# Patient Record
Sex: FEMALE | Race: WHITE | NOT HISPANIC OR LATINO | Employment: UNEMPLOYED | URBAN - METROPOLITAN AREA
[De-identification: names, ages, dates, MRNs, and addresses within clinical notes are randomized per-mention and may not be internally consistent; named-entity substitution may affect disease eponyms.]

---

## 2017-02-20 ENCOUNTER — HISTORICAL (OUTPATIENT)
Dept: LAB | Facility: HOSPITAL | Age: 57
End: 2017-02-20

## 2017-07-27 ENCOUNTER — HISTORICAL (OUTPATIENT)
Dept: ADMINISTRATIVE | Facility: HOSPITAL | Age: 57
End: 2017-07-27

## 2017-07-27 LAB — TSH SERPL-ACNC: 0.53 MIU/ML (ref 0.36–3.74)

## 2017-08-10 ENCOUNTER — HISTORICAL (OUTPATIENT)
Dept: RADIOLOGY | Facility: HOSPITAL | Age: 57
End: 2017-08-10

## 2017-09-14 ENCOUNTER — HISTORICAL (OUTPATIENT)
Dept: RESPIRATORY THERAPY | Facility: HOSPITAL | Age: 57
End: 2017-09-14

## 2017-10-24 ENCOUNTER — HISTORICAL (OUTPATIENT)
Dept: RESPIRATORY THERAPY | Facility: HOSPITAL | Age: 57
End: 2017-10-24

## 2017-11-13 ENCOUNTER — HISTORICAL (OUTPATIENT)
Dept: RESPIRATORY THERAPY | Facility: HOSPITAL | Age: 57
End: 2017-11-13

## 2018-10-30 ENCOUNTER — HISTORICAL (OUTPATIENT)
Dept: ADMINISTRATIVE | Facility: HOSPITAL | Age: 58
End: 2018-10-30

## 2019-01-15 ENCOUNTER — HISTORICAL (OUTPATIENT)
Dept: ADMINISTRATIVE | Facility: HOSPITAL | Age: 59
End: 2019-01-15

## 2019-07-18 ENCOUNTER — HISTORICAL (OUTPATIENT)
Dept: CARDIOLOGY | Facility: HOSPITAL | Age: 59
End: 2019-07-18

## 2020-07-28 ENCOUNTER — HISTORICAL (OUTPATIENT)
Dept: ADMINISTRATIVE | Facility: HOSPITAL | Age: 60
End: 2020-07-28

## 2020-07-28 LAB
APTT PPP: 36.8 SECOND(S) (ref 23.2–33.7)
INR PPP: 1 (ref 0–1.3)
PLATELET # BLD AUTO: 269 X10(3)/MCL (ref 130–400)
PROTHROMBIN TIME: 12.3 SECOND(S) (ref 11.1–13.7)

## 2020-09-18 ENCOUNTER — HISTORICAL (OUTPATIENT)
Dept: ADMINISTRATIVE | Facility: HOSPITAL | Age: 60
End: 2020-09-18

## 2020-09-18 LAB — PROT UR STRIP-MCNC: <6.8 MG/DL

## 2020-10-26 ENCOUNTER — HISTORICAL (OUTPATIENT)
Dept: CARDIOLOGY | Facility: HOSPITAL | Age: 60
End: 2020-10-26

## 2022-01-12 ENCOUNTER — HISTORICAL (OUTPATIENT)
Dept: ADMINISTRATIVE | Facility: HOSPITAL | Age: 62
End: 2022-01-12

## 2022-02-09 ENCOUNTER — HISTORICAL (OUTPATIENT)
Dept: ADMINISTRATIVE | Facility: HOSPITAL | Age: 62
End: 2022-02-09

## 2022-04-11 ENCOUNTER — HISTORICAL (OUTPATIENT)
Dept: ADMINISTRATIVE | Facility: HOSPITAL | Age: 62
End: 2022-04-11
Payer: MEDICAID

## 2022-04-18 ENCOUNTER — HISTORICAL (OUTPATIENT)
Dept: ADMINISTRATIVE | Facility: HOSPITAL | Age: 62
End: 2022-04-18
Payer: MEDICAID

## 2022-04-18 LAB — SARS-COV-2 AG RESP QL IA.RAPID: NEGATIVE

## 2022-04-21 ENCOUNTER — HISTORICAL (OUTPATIENT)
Dept: SURGERY | Facility: HOSPITAL | Age: 62
End: 2022-04-21
Payer: MEDICAID

## 2022-04-28 VITALS
BODY MASS INDEX: 17.72 KG/M2 | WEIGHT: 110.25 LBS | DIASTOLIC BLOOD PRESSURE: 96 MMHG | HEIGHT: 66 IN | SYSTOLIC BLOOD PRESSURE: 164 MMHG

## 2022-05-04 ENCOUNTER — HOSPITAL ENCOUNTER (OUTPATIENT)
Dept: RADIOLOGY | Facility: HOSPITAL | Age: 62
Discharge: HOME | End: 2022-05-04
Attending: STUDENT IN AN ORGANIZED HEALTH CARE EDUCATION/TRAINING PROGRAM
Payer: MEDICAID

## 2022-05-04 ENCOUNTER — OFFICE VISIT (OUTPATIENT)
Dept: ORTHOPEDICS | Facility: CLINIC | Age: 62
End: 2022-05-04
Payer: MEDICAID

## 2022-05-04 VITALS
OXYGEN SATURATION: 99 % | BODY MASS INDEX: 18.77 KG/M2 | TEMPERATURE: 98 F | RESPIRATION RATE: 16 BRPM | HEIGHT: 65 IN | SYSTOLIC BLOOD PRESSURE: 142 MMHG | DIASTOLIC BLOOD PRESSURE: 81 MMHG | WEIGHT: 112.63 LBS | HEART RATE: 79 BPM

## 2022-05-04 DIAGNOSIS — M18.12 PRIMARY OSTEOARTHRITIS OF FIRST CARPOMETACARPAL JOINT OF LEFT HAND: Primary | ICD-10-CM

## 2022-05-04 PROCEDURE — 29125 APPL SHORT ARM SPLINT STATIC: CPT | Mod: S$PBB,58,LT, | Performed by: ORTHOPAEDIC SURGERY

## 2022-05-04 PROCEDURE — 99024 POSTOP FOLLOW-UP VISIT: CPT | Mod: ,,, | Performed by: ORTHOPAEDIC SURGERY

## 2022-05-04 PROCEDURE — 29125 APPL SHORT ARM SPLINT STATIC: CPT | Mod: PBBFAC | Performed by: ORTHOPAEDIC SURGERY

## 2022-05-04 PROCEDURE — 99024 PR POST-OP FOLLOW-UP VISIT: ICD-10-PCS | Mod: ,,, | Performed by: ORTHOPAEDIC SURGERY

## 2022-05-04 PROCEDURE — 29125 PR APPLY FOREARM SPLINT,STATIC: ICD-10-PCS | Mod: S$PBB,58,LT, | Performed by: ORTHOPAEDIC SURGERY

## 2022-05-04 PROCEDURE — 99214 OFFICE O/P EST MOD 30 MIN: CPT | Mod: PBBFAC

## 2022-05-04 PROCEDURE — 73130 X-RAY EXAM OF HAND: CPT | Mod: TC,LT

## 2022-05-04 NOTE — PROGRESS NOTES
[facility] Clinic Note    In brief, Araceli Cosby is a 62 y.o. female s/p L trapeziectomy and 1st metacarpal suspension on 4/21/22. Today she states that a lot of her pain at rest has been resolved.  Denies numbness tingling to the hand, no fevers chills, some surgical site pain.      PMH:   Past Medical History:   Diagnosis Date    Allergies     GERD (gastroesophageal reflux disease)     Osteoporosis        PSH:   Past Surgical History:   Procedure Laterality Date    APPENDECTOMY      HYSTERECTOMY         SH:   Social History     Socioeconomic History    Marital status: Single   Tobacco Use    Smoking status: Never Smoker    Smokeless tobacco: Never Used   Substance and Sexual Activity    Drug use: Never    Sexual activity: Not Currently       FH:   Family History   Problem Relation Age of Onset    No Known Problems Mother     No Known Problems Father     No Known Problems Sister     No Known Problems Brother     No Known Problems Maternal Aunt     No Known Problems Maternal Uncle     No Known Problems Paternal Aunt     No Known Problems Paternal Uncle     No Known Problems Maternal Grandmother     No Known Problems Maternal Grandfather     No Known Problems Paternal Grandmother     No Known Problems Paternal Grandfather     Aneurysm Neg Hx     Cancer Neg Hx     Clotting disorder Neg Hx     Dementia Neg Hx     Diabetes Neg Hx     Fainting Neg Hx     Heart disease Neg Hx     Hyperlipidemia Neg Hx     Kidney disease Neg Hx     Liver disease Neg Hx     Migraines Neg Hx     Neuropathy Neg Hx     Obesity Neg Hx     Parkinsonism Neg Hx     Seizures Neg Hx     Stroke Neg Hx     Tremor Neg Hx        Allergies:   Review of patient's allergies indicates:   Allergen Reactions    Acyclovir analogues     Amlodipine     Cardizem [diltiazem hcl]     Doxycycline     Hydrocodone     Hydromorphone     Iodine and iodide containing products     Keflex [cephalexin]      Metoprolol     Nsaids (non-steroidal anti-inflammatory drug)     Paxil [paroxetine hcl]     Serotonin 5ht-3 antagonists     Sulfa (sulfonamide antibiotics)     Tramadol     Wellbutrin [bupropion hcl]        ROS:  Constitutional- no fever, fatigue, weakness  Eye- no vision loss, eye redness, drainage, or pain  ENMT- no sore throat, ear pain, sinus pain/congestion, nasal congestion/drainage  Respiratory- no cough, wheezing, or shortness of breath  CV- no chest pain, no palpitations, no edema  GI- no N/V/D; no abdominal pain    Physical Exam:  Vitals:    05/04/22 1153   BP: (!) 142/81   Pulse: 79   Resp: 16   Temp: 98.4 °F (36.9 °C)       General: NAD  Cardio: RRR by peripheral pulse  Pulm: Normal WOB on room air, symmetric chest rise  Abd: Soft, NT/ND    MSK:  LUE-  Surgical incision clean dry intact no evidence of infection, sutured in place  Pinned in place  EPL FPL intact  Neurovascular intact    Imaging:   Xrs of the L hand obtained in clinic today demonstrate and in place in appropriate position, no loss of reduction evident     Assessment:  62 y.o. female s/p L trapeziectomy and 1st metacarpal suspension on 4/21/22    -at this time, we will place the patient back in a thumb spica splint, the pin will remain in place for another 4 weeks  -patient return to clinic in 4 weeks for repeat x-rays and evaluation, plan to pull pin at that time  -ursula Madrigal MD  [facility]  5/4/2022 12:15 PM

## 2022-05-04 NOTE — PROGRESS NOTES
ATTENDING ADDENDUM    Araceli Cosby  was evaluated at the time of the encounter with Dr Madrigal.  HPI, PE and treatment plan was reviewed. Treatment plan was reasonable and necessary. Imaging was reviewed at the time of visit.

## 2022-05-06 ENCOUNTER — TELEPHONE (OUTPATIENT)
Dept: ORTHOPEDICS | Facility: CLINIC | Age: 62
End: 2022-05-06
Payer: MEDICAID

## 2022-05-06 RX ORDER — HYDROCODONE BITARTRATE AND ACETAMINOPHEN 5; 325 MG/1; MG/1
1 TABLET ORAL EVERY 6 HOURS PRN
Qty: 28 TABLET | Refills: 0 | Status: SHIPPED | OUTPATIENT
Start: 2022-05-06 | End: 2022-05-13

## 2022-05-06 NOTE — TELEPHONE ENCOUNTER
Refill sent to pharmacy    ----- Message from Kishore Del Rio RN sent at 5/6/2022  9:01 AM CDT -----  Regarding: patient request  Patient called this morning and stated that she forgot to ask for pain medicine on her last visit. Patient is 2 weeks post-operative date. Pain medicine not listed in EMR for her surgical procedure. Please advise.

## 2022-05-09 ENCOUNTER — OFFICE VISIT (OUTPATIENT)
Dept: ORTHOPEDICS | Facility: CLINIC | Age: 62
End: 2022-05-09
Payer: MEDICAID

## 2022-05-09 ENCOUNTER — HOSPITAL ENCOUNTER (OUTPATIENT)
Dept: RADIOLOGY | Facility: HOSPITAL | Age: 62
Discharge: HOME | End: 2022-05-09
Attending: STUDENT IN AN ORGANIZED HEALTH CARE EDUCATION/TRAINING PROGRAM
Payer: MEDICAID

## 2022-05-09 VITALS — WEIGHT: 111.63 LBS | BODY MASS INDEX: 18.6 KG/M2 | HEIGHT: 65 IN

## 2022-05-09 DIAGNOSIS — M84.454D INSUFFICIENCY FRACTURE OF PELVIS WITH ROUTINE HEALING, SUBSEQUENT ENCOUNTER: Primary | ICD-10-CM

## 2022-05-09 DIAGNOSIS — M18.0 PRIMARY OSTEOARTHRITIS OF BOTH FIRST CARPOMETACARPAL JOINTS: ICD-10-CM

## 2022-05-09 DIAGNOSIS — M84.454D INSUFFICIENCY FRACTURE OF PELVIS WITH ROUTINE HEALING, SUBSEQUENT ENCOUNTER: ICD-10-CM

## 2022-05-09 PROCEDURE — 99214 OFFICE O/P EST MOD 30 MIN: CPT | Mod: PBBFAC

## 2022-05-09 PROCEDURE — 99214 OFFICE O/P EST MOD 30 MIN: CPT | Mod: S$PBB,,, | Performed by: ORTHOPAEDIC SURGERY

## 2022-05-09 PROCEDURE — 99214 PR OFFICE/OUTPT VISIT, EST, LEVL IV, 30-39 MIN: ICD-10-PCS | Mod: S$PBB,,, | Performed by: ORTHOPAEDIC SURGERY

## 2022-05-09 PROCEDURE — 72190 X-RAY EXAM OF PELVIS: CPT | Mod: TC

## 2022-05-09 NOTE — PROGRESS NOTES
[facility] Orthopaedic Surgery Clinic Note    In brief, Araceli Cosby is a 62 y.o. female seen previously for bilateral basilar thumb arthritis as well as anterior pelvic ring insufficiency fractures. She is here today for evaluation of her pelvis. Today she states that her pelvis injury was back in December.  The left side is bothering her more today. Today she has pain in her bilateral hips as well as her back and the anterior part of her pelvis.  She is also experiencing a lot of urinary incontinence, urgency, hesitancy.  She has not seen an OBGYN or a joint specialist about her hips.  She is on multiple supplementation including calcium and vitamin-D.      PMH:   Past Medical History:   Diagnosis Date    Allergies     GERD (gastroesophageal reflux disease)     Osteoporosis        PSH:   Past Surgical History:   Procedure Laterality Date    APPENDECTOMY      HYSTERECTOMY         SH:   Social History     Socioeconomic History    Marital status: Single   Tobacco Use    Smoking status: Never Smoker    Smokeless tobacco: Never Used   Substance and Sexual Activity    Drug use: Never    Sexual activity: Not Currently       FH:   Family History   Problem Relation Age of Onset    No Known Problems Mother     No Known Problems Father     No Known Problems Sister     No Known Problems Brother     No Known Problems Maternal Aunt     No Known Problems Maternal Uncle     No Known Problems Paternal Aunt     No Known Problems Paternal Uncle     No Known Problems Maternal Grandmother     No Known Problems Maternal Grandfather     No Known Problems Paternal Grandmother     No Known Problems Paternal Grandfather     Aneurysm Neg Hx     Cancer Neg Hx     Clotting disorder Neg Hx     Dementia Neg Hx     Diabetes Neg Hx     Fainting Neg Hx     Heart disease Neg Hx     Hyperlipidemia Neg Hx     Kidney disease Neg Hx     Liver disease Neg Hx     Migraines Neg Hx     Neuropathy Neg Hx     Obesity Neg Hx      Parkinsonism Neg Hx     Seizures Neg Hx     Stroke Neg Hx     Tremor Neg Hx        Allergies:   Review of patient's allergies indicates:   Allergen Reactions    Acyclovir analogues     Amlodipine     Cardizem [diltiazem hcl]     Doxycycline     Hydrocodone     Hydromorphone     Iodine and iodide containing products     Keflex [cephalexin]     Metoprolol     Nsaids (non-steroidal anti-inflammatory drug)     Paxil [paroxetine hcl]     Serotonin 5ht-3 antagonists     Sulfa (sulfonamide antibiotics)     Tramadol     Wellbutrin [bupropion hcl]        ROS:  Constitutional- no fever, fatigue, weakness  Eye- no vision loss, eye redness, drainage, or pain  ENMT- no sore throat, ear pain, sinus pain/congestion, nasal congestion/drainage  Respiratory- no cough, wheezing, or shortness of breath  CV- no chest pain, no palpitations, no edema  GI- no N/V/D; no abdominal pain    Physical Exam:  There were no vitals filed for this visit.    General: NAD  Cardio: RRR by peripheral pulse  Pulm: Normal WOB on room air, symmetric chest rise  Abd: Soft, NT/ND    MSK:  Pelvis:  Some tenderness to palpation in the anterior aspect of the pelvis as well as some pain with squeezing of the pelvis  Pain with internal rotation of bilateral hips in the groin  Neurovascular intact    Bilateral hands:    DNVI hands bilaterally, pain at base of 1st CMC joint. Positive 1st CMC grind.  Negative Finkelstein.    Imaging:   X-rays obtained in clinic today of the pelvis demonstrate interval callus formation of the right superior inferior rami, no obvious fractures of the left superior inferior rami    Assessment:  62F with history of anterior pelvic insufficiency fractures    -I believe the patient has multifactorial reasons for her pelvic pain including old insufficiency fractures that are still in the process of healing, history of old vertebral body fracture, bilateral hip arthritis  -will place referral for OB gyn for assessment  of bladder issues as well as a referral for total joint specialist and withdrawals  -will see patient back for her scheduled visit for her left trapezii ectomy and suspension arthroplasty do not    Jose Madrigal MD  [facility]  5/9/2022 10:55 AM

## 2022-05-14 NOTE — OP NOTE
DATE OF SURGERY:    04/21/2022    SURGEON:  Vern Murrell MD    attending physician:  Vern Murrell MD    PREOPERATIVE DIAGNOSIS:  Carpometacarpal arthritis, left thumb.    POSTOPERATIVE DIAGNOSIS:  Carpometacarpal arthritis, left thumb.    PROCEDURE:  Left thumb trapeziectomy with K-wire stabilization.    INDICATIONS FOR PROCEDURE:  The patient is a 62-year-old female with longstanding history of CMC arthritis, left thumb.  She presents for operative correction.    ANESTHESIA:  MAC, local.    COMPLICATIONS:  None.    PROCEDURE IN DETAIL:  The patient was placed under MAC anesthesia and local block carried out.  A dorsal radial incision was made using a 15 blade scalpel and tenotomy scissors were used to dissect down to the level of the first CMC joint being careful not injure the radial artery or the tendons.  The capsule was opened and a 15 blade scalpel under fluoroscopic guidance was used to isolate out the trapezium in its entirety.  Retractors were placed and rongeur was used to remove the trapezium in its entirety.  Fluoroscope identified and confirmed that we removed the entire trapezium.  We placed a 0.045 K-wire across the first metacarpal and the second metacarpal to stabilize it.  Thumb spica splint applied.  The skin was closed using 4-0 nylon.  No complications.  Scrubbed and present for the entire procedure.        ______________________________  MD JONATHAN Lepe/JAMALL  DD:  04/21/2022  Time:  11:56AM  DT:  04/21/2022  Time:  12:23PM  Job #:  [id number]

## 2022-05-16 ENCOUNTER — OFFICE VISIT (OUTPATIENT)
Dept: ORTHOPEDICS | Facility: CLINIC | Age: 62
End: 2022-05-16
Payer: MEDICAID

## 2022-05-16 ENCOUNTER — HOSPITAL ENCOUNTER (OUTPATIENT)
Dept: RADIOLOGY | Facility: HOSPITAL | Age: 62
Discharge: HOME | End: 2022-05-16
Attending: ORTHOPAEDIC SURGERY
Payer: MEDICAID

## 2022-05-16 DIAGNOSIS — M18.0 PRIMARY OSTEOARTHRITIS OF BOTH FIRST CARPOMETACARPAL JOINTS: ICD-10-CM

## 2022-05-16 DIAGNOSIS — M18.0 PRIMARY OSTEOARTHRITIS OF BOTH FIRST CARPOMETACARPAL JOINTS: Primary | ICD-10-CM

## 2022-05-16 PROCEDURE — 73100 X-RAY EXAM OF WRIST: CPT | Mod: TC,LT

## 2022-05-16 PROCEDURE — 99024 POSTOP FOLLOW-UP VISIT: CPT | Mod: ,,, | Performed by: ORTHOPAEDIC SURGERY

## 2022-05-16 PROCEDURE — 99213 OFFICE O/P EST LOW 20 MIN: CPT | Mod: PBBFAC

## 2022-05-16 PROCEDURE — 99024 PR POST-OP FOLLOW-UP VISIT: ICD-10-PCS | Mod: ,,, | Performed by: ORTHOPAEDIC SURGERY

## 2022-05-16 PROCEDURE — 1159F MED LIST DOCD IN RCRD: CPT | Mod: CPTII,,, | Performed by: ORTHOPAEDIC SURGERY

## 2022-05-16 PROCEDURE — 1159F PR MEDICATION LIST DOCUMENTED IN MEDICAL RECORD: ICD-10-PCS | Mod: CPTII,,, | Performed by: ORTHOPAEDIC SURGERY

## 2022-05-16 NOTE — PROGRESS NOTES
[facility]  Established Patient Office Visit  05/16/2022       Patient ID: Araceli Cosby  YOB: 1960  MRN: [id number]    Diagnosis:    [unfilled]     Procedure:     Three weeks status post left trapezii ectomy and pinning of 1st metacarpal    Chief Complaint: Pain of the Left Wrist and Post-op Evaluation      Araceli Cosby is a 62 y.o. female who presents for follow up treatment of the above mentioned diagnosis. The patient's last visit with me was on Visit date not found.  Patient returns clinic today complaining of some ulnar-sided wrist pain increased over the weekend.  Denies any fevers/chills/night sweats.  Otherwise no change in her condition      Past Medical History:    Past Medical History:   Diagnosis Date    Allergies     GERD (gastroesophageal reflux disease)     Osteoporosis      Past Surgical History:   Procedure Laterality Date    APPENDECTOMY      HYSTERECTOMY       Family History   Problem Relation Age of Onset    No Known Problems Mother     No Known Problems Father     No Known Problems Sister     No Known Problems Brother     No Known Problems Maternal Aunt     No Known Problems Maternal Uncle     No Known Problems Paternal Aunt     No Known Problems Paternal Uncle     No Known Problems Maternal Grandmother     No Known Problems Maternal Grandfather     No Known Problems Paternal Grandmother     No Known Problems Paternal Grandfather     Aneurysm Neg Hx     Cancer Neg Hx     Clotting disorder Neg Hx     Dementia Neg Hx     Diabetes Neg Hx     Fainting Neg Hx     Heart disease Neg Hx     Hyperlipidemia Neg Hx     Kidney disease Neg Hx     Liver disease Neg Hx     Migraines Neg Hx     Neuropathy Neg Hx     Obesity Neg Hx     Parkinsonism Neg Hx     Seizures Neg Hx     Stroke Neg Hx     Tremor Neg Hx      Social History     Socioeconomic History    Marital status: Single   Tobacco Use    Smoking status: Never  Smoker    Smokeless tobacco: Never Used   Substance and Sexual Activity    Drug use: Never    Sexual activity: Not Currently     Medication List with Changes/Refills   Current Medications    AZELAIC ACID 20 % CREAM    Apply topically 2 (two) times daily.    B COMPLEX VITAMINS CAPSULE    Take 1 capsule by mouth once daily.    CALCIUM CARBONATE (TUMS) 200 MG CALCIUM (500 MG) CHEWABLE TABLET    Take 1 tablet by mouth once daily.    DICYCLOMINE (BENTYL) 10 MG CAPSULE    Take 10 mg by mouth 4 (four) times daily before meals and nightly.    FLECAINIDE (TAMBOCOR) 50 MG TAB    Take 50 mg by mouth every 12 (twelve) hours.    LORATADINE (CLARITIN) 10 MG TABLET    Take 10 mg by mouth once daily.    MONTELUKAST (SINGULAIR) 10 MG TABLET    Take 10 mg by mouth every evening.    PANTOPRAZOLE (PROTONIX) 40 MG TABLET    Take 40 mg by mouth once daily.    PREGABALIN (LYRICA) 75 MG CAPSULE    Take 75 mg by mouth 2 (two) times daily.    RANITIDINE (ZANTAC) 300 MG TABLET    Take 300 mg by mouth every evening.    SUMATRIPTAN (IMITREX) 20 MG/ACTUATION NASAL SPRAY    1 spray by Nasal route every 2 (two) hours as needed for Migraine.     Review of patient's allergies indicates:   Allergen Reactions    Acyclovir analogues     Amlodipine     Cardizem [diltiazem hcl]     Doxycycline     Hydrocodone     Hydromorphone     Iodine and iodide containing products     Keflex [cephalexin]     Metoprolol     Nsaids (non-steroidal anti-inflammatory drug)     Paxil [paroxetine hcl]     Serotonin 5ht-3 antagonists     Sulfa (sulfonamide antibiotics)     Tramadol     Wellbutrin [bupropion hcl]        ROS:    There is no height or weight on file to calculate BMI.  GENERAL: Well appearing, appropriate for stated age, no acute distress.  CARDIOVASCULAR: Pulses regular by peripheral palpation.  PULMONARY: Respirations are even and non-labored.  NEURO: Awake, alert, and oriented x 3.  PSYCH: Mood & affect are appropriate.  HEENT: Head is  normocephalic and atraumatic.    Physical Exam:    Left arm:  Patient of Dr. Crystal in left arm.  Pin site is clean dry and intact no signs of infection.  Wound clean dry and intact.  Tender to palpation the ulnar side of the patient's wrist.  Some swelling in the volar aspect of the wrist with ecchymosis.    Imaging:    No image results found.     Relevant imaging results reviewed and interpreted by me, discussed with the patient and / or family today.  X-ray left wrist performed today reviewed patient demonstrates no interval change from previous imaging    Assessment and Plan:    Araceli Cosby is a 62 y.o. female seen in the office today for The encounter diagnosis was Primary osteoarthritis of both first carpometacarpal joints..  Patient will initiate history of fibromyalgia and chronic pain.  I suspect this with a combination of the blunt positioning had caused some ulnar-sided wrist pain.  Splint is removed in clinic and she immediately had that her pain is improved.  New splint placed on the patient's rest.  Follow-up as scheduled in 3 weeks.      Orders Placed This Encounter    X-Ray Wrist 2 View Left

## 2022-05-21 NOTE — HISTORICAL [facility]ER
This is a historical note converted from Joe. Formatting and pictures may have been removed.  Please reference Joe for original formatting and attached multimedia. Chief Complaint  Chief Complaint  [facility]  History of Present Illness  61-year-old RHD female?who was referred to our clinic for anterior?pelvic symphysis pain?that started when she slipped?around 12/11/2021?and?had?immediate pain to her anterior pelvis.  ?   She is referred to our clinic today for left?basilar thumb arthritis. 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 ?Has history of carpal tunnel is in the right.? The pain limits her activities day living. ?She has a brace which is no longer giving her any pain relief.??Had 2 series of injections to the left basilar thumb?without any improvement. ?Has not had an injection at the right side for some time.? Denies any numbness, Marvin, loss motor function bilateral upper extremities.? No other complaints concerns this time.?  ?   ?  Review of Systems  Constitutional:?no fever, fatigue, weakness  Eye:?no vision loss, eye redness, drainage, or pain  ENMT:?no sore throat, ear pain, sinus pain/congestion, nasal congestion/drainage  Respiratory:?no cough, no wheezing, no shortness of breath  Cardiovascular:?no chest pain, no palpitations, no edema  Gastrointestinal:?no nausea, vomiting, or diarrhea. No abdominal pain  Physical Exam  ??Vitals & Measurements  ??HT:?167.74?cm? WT:?53.000?kg? BMI:?18.84?  Exam: Bilateral hands  Positive grind over the 1st CMC of bilateral hands, TTP over bilateral 1st CMC joints as well with noted crepitus and pain.  Negative Finkelstein  Overall skin is benign  Motor intact PIN/AIN/U  SLT intact M/USR  ?   Imaging:  X-ray bilateral hands:  There is significant arthritis of the?basilar thumb including the trapeziectomy with some osteophytes to bilateral hands and subchondral sclerosis.  Assessment/Plan  1.?Arthritis of  carpometacarpal (CMC) joint of left thumb?M18.12,?Osteoarthritis of carpometacarpal (CMC) joint of left thumb?M18.12  ??Ordered:  Lidocaine inj., 1 mL, form: Injection, Intra-Articular, Once-Unscheduled, first dose 03/02/22 15:08:00 CST  triamcinolone, 40 mg, form: Injection, Intra-Articular, Once, first dose 03/02/22 16:00:00 CST, stop date 03/02/22 16:00:00 CST  XR Hand Left Minimum 3 Views, Routine, 03/02/22 14:59:00 CST, None, Patient Bed, Patient Has IV?, Rad Type, Arthritis of carpometacarpal (CMC) joint of left thumb  Arthritis of iyylufon-nvgqfnzgr-ehopddnpb joint of left hand  Osteoarthritis of basilar joint of thumb  Arthritis...  XR Hand Right Minimum 3 Views, Routine, 03/02/22 14:59:00 CST, None, Patient Bed, Patient Has IV?, Rad Type, Arthritis of carpometacarpal (CMC) joint of left thumb  Arthritis of sysafupm-lhucedtcd-vjqsozojc joint of left hand  Osteoarthritis of basilar joint of thumb  Arthritis...  ?  2.?Arthritis of nehfvcys-ofdhhcqaa-zdxnzaopy joint of left hand?M19.032  ??Ordered:  Lidocaine inj., 1 mL, form: Injection, Intra-Articular, Once-Unscheduled, first dose 03/02/22 15:08:00 CST  triamcinolone, 40 mg, form: Injection, Intra-Articular, Once, first dose 03/02/22 16:00:00 CST, stop date 03/02/22 16:00:00 CST  XR Hand Left Minimum 3 Views, Routine, 03/02/22 14:59:00 CST, None, Patient Bed, Patient Has IV?, Rad Type, Arthritis of carpometacarpal (CMC) joint of left thumb  Arthritis of jcniruhg-aznodmjlh-tjmygwbde joint of left hand  Osteoarthritis of basilar joint of thumb  Arthritis...  XR Hand Right Minimum 3 Views, Routine, 03/02/22 14:59:00 CST, None, Patient Bed, Patient Has IV?, Rad Type, Arthritis of carpometacarpal (CMC) joint of left thumb  Arthritis of wdmruuck-fjyblqqsb-cqtbvnwgc joint of left hand  Osteoarthritis of basilar joint of thumb  Arthritis...  ?  4.?Osteoarthritis of basilar joint of thumb?M18.9  ??Ordered:  Lidocaine inj., 1 mL, form: Injection,  Intra-Articular, Once-Unscheduled, first dose 03/02/22 15:08:00 CST  triamcinolone, 40 mg, form: Injection, Intra-Articular, Once, first dose 03/02/22 16:00:00 CST, stop date 03/02/22 16:00:00 CST  XR Hand Left Minimum 3 Views, Routine, 03/02/22 14:59:00 CST, None, Patient Bed, Patient Has IV?, Rad Type, Arthritis of carpometacarpal (CMC) joint of left thumb  Arthritis of pmyzasvo-zrzquacmm-kkvopqkfc joint of left hand  Osteoarthritis of basilar joint of thumb  Arthritis...  XR Hand Right Minimum 3 Views, Routine, 03/02/22 14:59:00 CST, None, Patient Bed, Patient Has IV?, Rad Type, Arthritis of carpometacarpal (CMC) joint of left thumb  Arthritis of dztlbojb-iuxfupalg-zmqmrrawd joint of left hand  Osteoarthritis of basilar joint of thumb  Arthritis...  ?  5.?Arthritis of carpometacarpal (CMC) joint of right thumb?M18.11  ??Ordered:  Lidocaine inj., 1 mL, form: Injection, Intra-Articular, Once-Unscheduled, first dose 03/02/22 15:08:00 CST  triamcinolone, 40 mg, form: Injection, Intra-Articular, Once, first dose 03/02/22 16:00:00 CST, stop date 03/02/22 16:00:00 CST  XR Hand Left Minimum 3 Views, Routine, 03/02/22 14:59:00 CST, None, Patient Bed, Patient Has IV?, Rad Type, Arthritis of carpometacarpal (CMC) joint of left thumb  Arthritis of gomjoarr-tvqululgg-fraymvzhc joint of left hand  Osteoarthritis of basilar joint of thumb  Arthritis...  XR Hand Right Minimum 3 Views, Routine, 03/02/22 14:59:00 CST, None, Patient Bed, Patient Has IV?, Rad Type, Arthritis of carpometacarpal (CMC) joint of left thumb  Arthritis of sawamnkj-ptjmoirlf-ldjkkisao joint of left hand  Osteoarthritis of basilar joint of thumb  Arthritis...  ?  61-year-old female?with?bilateral?superior and inferior?pubic rami insufficiency fractures who is referred to our clinic today for new problem of bilateral basilar?thumb arthritis. ?She has had?a series of injections to the left side that failed relieve her pain. ?She is not had  injection right side for some time.??Thus far she has failed conservative therapy for left?basilar thumb arthritis would like to pursue surgical intervention. ?Discussed risk and benefits procedure in detail with patient and she would like proceed with surgery. ?We will schedule her for?trapeziectomy and suspension arthroplasty?for the left basilar thumb arthritis?on?4/21/2022 with Dr. Murrell.? We will give her a cortisone injection to the right basilar thumb?today.? Continue using bracing as needed. ?We will see her for surgery.  ?   Problem List/Past Medical History  Ongoing  Allergic conjunctivitis of both eyes  Anxiety  Back injury  Bradycardia  CHB - Complete heart block  Chronic back pain  Chronic fatigue syndrome  Compression fracture of thoracic spine  Episodic anisocoria  Fibromyalgia  GERD - Gastro-esophageal reflux disease  Hiatal hernia  History of uveitis  Hypoxemia  Interstitial cystitis  Lung nodules  Meibomian gland dysfunction (MGD) of both eyes  Mitral valve leaf prolapse  Mitral valve prolapse  SCHUYLER - Obstructive sleep apnea  Osteoarthritis of CMC joint of thumb  Osteoporosis  PAC - Premature atrial contraction  PTSD - Post-traumatic stress disorder  Raynaud disease  Refractive error  Right knee pain  Sleep apnea  Sleep related hypoventilation or hypoxemia  Tenosynovitis, de Quervain  TMJ syndrome  Venous insufficiency of lower extemity  Venous stasis  Historical  No qualifying data  Procedure/Surgical History  Catheter placement in coronary artery(s) for coronary angiography, including intraprocedural injection(s) for coronary angiography, imaging supervision and interpretation; with left heart catheterization including intraprocedural injection(s) for left carina (10/26/2020)  Fluoroscopy of Left Heart using Low Osmolar Contrast (10/26/2020)  Fluoroscopy of Multiple Coronary Arteries using Low Osmolar Contrast (10/26/2020)  Measurement of Cardiac Sampling and Pressure, Left Heart, Percutaneous  Approach (10/26/2020)  Fluoroscopy of Spinal Cord using Low Osmolar Contrast (07/28/2020)  Injection procedure for myelography and/or computed tomography, lumbar (07/28/2020)  Myelogram (07/28/2020)  Intravascular ultrasound (noncoronary vessel) during diagnostic evaluation and/or therapeutic intervention, including radiological supervision and interpretation; each additional noncoronary vessel (List separately in addition to code for primary procedur (07/18/2019)  Intravascular ultrasound (noncoronary vessel) during diagnostic evaluation and/or therapeutic intervention, including radiological supervision and interpretation; initial noncoronary vessel (List separately in addition to code for primary procedure) (07/18/2019)  Introduction of catheter, superior or inferior vena cava (07/18/2019)  Introduction of catheter, superior or inferior vena cava (07/18/2019)  Ultrasonography of Inferior Vena Cava, Intravascular (07/18/2019)  Repair Left Hand Skin, External Approach (12/31/2016)  Simple repair of superficial wounds of scalp, neck, axillae, external genitalia, trunk and/or extremities (including hands and feet); 2.5 cm or less (12/31/2016)  1st Cervical & Extra Rib Removal  Appendectomy  Carpal Tunnel Release, Right Wrist  Exploratory Surgery, Collar Bone  Hysterectomy  Left Heart Catheterization: Diagnostic Only  Myelogram  Permanent Pacemaker Placement  CHAY - Total abdominal hysterectomy  Trigger Finger Release, Right Hand   Medications  Inpatient  No active inpatient medications  Home  acyclovir 5% topical cream, 1 lovely, TOP, Daily  albuterol CFC free 90 mcg/inh inhalation aerosol with adapter, Oral, q4-6hr  AZELASTINE  MCG SPRA, 2 spray(s), Nasal, Daily, PRN  calcium (as citrate)-vitamin D 200 mg-250 intl units oral tablet, 1 tab(s), Oral, Daily  carbamazepine 100 mg oral tablet, chewable, 100 mg= 1 tab(s), Oral, TID  chlorzoxazone 500 mg oral tablet, 500 mg= 1 tab(s), Oral, TID  cholecalciferol 5000  intl units (125 mcg) oral capsule, 5000 IntUnit= 1 cap(s), Oral, Daily  clobetasol 0.05% topical cream, 1 lovely, TOP, BID  FLECAINIDE 50MG Tablets, 50 mg= 1 tab(s), Oral, BID  hydrocortisone 2.5% topical cream, MS (rectal), BID  ketotifen 0.025% ophthalmic solution, 1 drop(s), Eye-Both, q8hr  levocetirizine 5 mg oral tablet, 5 mg= 1 tab(s), Oral, qPM  LYRICA 75 MG CAPSULE Capsules, 75 mg= 1 cap(s), Oral, TID  magnesium oxide 250 mg oral tablet  mometasone 0.1% topical cream, 1 lovely, TOP, Daily  mupirocin 2% topical ointment, Both Nostrils, TID  NICU Nystatin Topical Cream, TOP, BID  Pantoprazole 40 mg ORAL EC-Tablet, 40 mg= 1 tab(s), Oral, At Bedtime  potassium chloride 99 mg oral tablet, 99 mg= 1 tab(s), Oral, Daily  Probiotic Formula (Bacillus Coagulans) oral capsule, 1 cap(s), Oral, Daily  Singulair 10 mg oral TABLET, 10 mg= 1 tab(s), Oral, qPM  triamcinolone 0.1% topical cream, 1 lovely, TOP, BID  Vitamin B Complex oral capsule, 1 cap(s), Oral, Daily  Allergies  Cipro?(GI Upset, Nausea, Headache)  Contrast Dye?(Blisters)  Duexis?(GI Upset)  Flexeril?(Rash)  HYDROmorphone?(Unknown)  NSAIDs?(stomach pain)  Paxil?(Cognitive Disruption)  Wellbutrin?(GI Upset, Headache)  ZyrTEC?(Dries me out too much)  acyclovir?(Abdominal pain characteristic)  cephalexin?(Stomachache, Headache)  codeine?(Rash, Itching)  dilTIAZem?(Swelling, Difficulty breathing)  gabapentin?(Headache, GI Upset)  metoprolol?(Swelling, Difficulty breathing)  predniSONE  sulfa drugs?(Rash)  traMADol?(Headache)  Social History  Abuse/Neglect  No, 04/18/2022  No, No, Yes, 03/02/2022  Alcohol - Denies Alcohol Use, 04/30/2015  Never, Alcohol use interferes with work or home: No. Others hurt by drinking: No. Household alcohol concerns: No., 01/31/2022  Employment/School  Retired, 02/26/2019  Home/Environment  Lives with Alone., 02/26/2019  Nutrition/Health  Regular, 09/22/2017  Sexual  Gender Identity Identifies as female., 11/30/2021  Spiritual/Cultural  Non  denomination, 07/28/2020  Substance Use - Denies Substance Abuse, 04/30/2015  Tobacco - Denies Tobacco Use, 04/30/2015  Never (less than 100 in lifetime), No, 04/18/2022  Never (less than 100 in lifetime), N/A, 03/02/2022  Family History  Diabetes mellitus type II: Grandmother.  Hypertension.....: Sister.  Lung cancer.....: Father.  Primary malignant neoplasm of bone: Brother.Negative: Sister.  Primary malignant neoplasm of lung: Father.

## 2022-06-01 ENCOUNTER — OFFICE VISIT (OUTPATIENT)
Dept: ORTHOPEDICS | Facility: CLINIC | Age: 62
End: 2022-06-01
Payer: MEDICAID

## 2022-06-01 ENCOUNTER — HOSPITAL ENCOUNTER (OUTPATIENT)
Dept: RADIOLOGY | Facility: HOSPITAL | Age: 62
Discharge: HOME | End: 2022-06-01
Attending: STUDENT IN AN ORGANIZED HEALTH CARE EDUCATION/TRAINING PROGRAM
Payer: MEDICAID

## 2022-06-01 VITALS — HEIGHT: 65 IN | WEIGHT: 112 LBS | BODY MASS INDEX: 18.66 KG/M2

## 2022-06-01 DIAGNOSIS — M16.0 BILATERAL HIP JOINT ARTHRITIS: ICD-10-CM

## 2022-06-01 DIAGNOSIS — M25.532 LEFT WRIST PAIN: ICD-10-CM

## 2022-06-01 DIAGNOSIS — M25.532 LEFT WRIST PAIN: Primary | ICD-10-CM

## 2022-06-01 PROCEDURE — 99213 OFFICE O/P EST LOW 20 MIN: CPT | Mod: PBBFAC

## 2022-06-01 PROCEDURE — 99024 POSTOP FOLLOW-UP VISIT: CPT | Mod: ,,, | Performed by: ORTHOPAEDIC SURGERY

## 2022-06-01 PROCEDURE — 99024 PR POST-OP FOLLOW-UP VISIT: ICD-10-PCS | Mod: ,,, | Performed by: ORTHOPAEDIC SURGERY

## 2022-06-01 PROCEDURE — 73110 X-RAY EXAM OF WRIST: CPT | Mod: TC,LT

## 2022-06-01 NOTE — PROGRESS NOTES
[facility]  Established Patient Office Visit  06/01/2022       Patient ID: Araceli Cosby  YOB: 1960  MRN: [id number]      Procedure:     6 weeks status post left trapezii ectomy and pinning of 1st metacarpal    Chief Complaint: Pain of the Left Wrist and Post-op Evaluation      Araceli Cosby is a 62 y.o. female who presents for follow up treatment of the above mentioned diagnosis.  With regard to her left hand she feels like her pain has been improving.  She does still feel like she has some decreased hip strength but she feels like she may be over done it past couple of days.      Past Medical History:    Past Medical History:   Diagnosis Date    Allergies     GERD (gastroesophageal reflux disease)     Osteoporosis      Past Surgical History:   Procedure Laterality Date    APPENDECTOMY      HYSTERECTOMY       Family History   Problem Relation Age of Onset    No Known Problems Mother     No Known Problems Father     No Known Problems Sister     No Known Problems Brother     No Known Problems Maternal Aunt     No Known Problems Maternal Uncle     No Known Problems Paternal Aunt     No Known Problems Paternal Uncle     No Known Problems Maternal Grandmother     No Known Problems Maternal Grandfather     No Known Problems Paternal Grandmother     No Known Problems Paternal Grandfather     Aneurysm Neg Hx     Cancer Neg Hx     Clotting disorder Neg Hx     Dementia Neg Hx     Diabetes Neg Hx     Fainting Neg Hx     Heart disease Neg Hx     Hyperlipidemia Neg Hx     Kidney disease Neg Hx     Liver disease Neg Hx     Migraines Neg Hx     Neuropathy Neg Hx     Obesity Neg Hx     Parkinsonism Neg Hx     Seizures Neg Hx     Stroke Neg Hx     Tremor Neg Hx      Social History     Socioeconomic History    Marital status: Single   Tobacco Use    Smoking status: Never Smoker    Smokeless tobacco: Never Used   Substance and Sexual Activity     Alcohol use: Never    Drug use: Never    Sexual activity: Not Currently     Medication List with Changes/Refills   Current Medications    AZELAIC ACID 20 % CREAM    Apply topically 2 (two) times daily.    B COMPLEX VITAMINS CAPSULE    Take 1 capsule by mouth once daily.    CALCIUM CARBONATE (TUMS) 200 MG CALCIUM (500 MG) CHEWABLE TABLET    Take 1 tablet by mouth once daily.    DICYCLOMINE (BENTYL) 10 MG CAPSULE    Take 10 mg by mouth 4 (four) times daily before meals and nightly.    FLECAINIDE (TAMBOCOR) 50 MG TAB    Take 50 mg by mouth every 12 (twelve) hours.    LORATADINE (CLARITIN) 10 MG TABLET    Take 10 mg by mouth once daily.    MONTELUKAST (SINGULAIR) 10 MG TABLET    Take 10 mg by mouth every evening.    PANTOPRAZOLE (PROTONIX) 40 MG TABLET    Take 40 mg by mouth once daily.    PREGABALIN (LYRICA) 75 MG CAPSULE    Take 75 mg by mouth 2 (two) times daily.    RANITIDINE (ZANTAC) 300 MG TABLET    Take 300 mg by mouth every evening.    SUMATRIPTAN (IMITREX) 20 MG/ACTUATION NASAL SPRAY    1 spray by Nasal route every 2 (two) hours as needed for Migraine.     Review of patient's allergies indicates:   Allergen Reactions    Acyclovir analogues     Amlodipine     Cardizem [diltiazem hcl]     Doxycycline     Hydrocodone     Hydromorphone     Iodine and iodide containing products     Keflex [cephalexin]     Metoprolol     Nsaids (non-steroidal anti-inflammatory drug)     Paxil [paroxetine hcl]     Serotonin 5ht-3 antagonists     Sulfa (sulfonamide antibiotics)     Tramadol     Wellbutrin [bupropion hcl]        ROS:    Body mass index is 18.64 kg/m².  GENERAL: Well appearing, appropriate for stated age, no acute distress.  CARDIOVASCULAR: Pulses regular by peripheral palpation.  PULMONARY: Respirations are even and non-labored.  NEURO: Awake, alert, and oriented x 3.  PSYCH: Mood & affect are appropriate.  HEENT: Head is normocephalic and atraumatic.    Physical Exam:    Left arm:    Pin in place  with pin site clean and dry  +FPL, EPL  SILT M/U/R  +ain/pin/ulnar  2+ RP    Imaging:  Xray left hand:  Patient is status post trapezii ectomy small ridge of bone remaining.  Pin placed from 1st to 2nd metacarpal bases.  No interval changes.      Assessment and Plan:    Araceli Cosby is a 62 y.o. female seen in the office today for follow-up for L trapeziectomy with pinning 6 weeks ago.     Pin removed today.  Placed in a soft brace.  She can wear the brace as tolerated.  OT referral given.  Activity as tolerated.    Referral given to Dr. Alexander for bilateral hip pain.     OB referral placed on 5/9 awaiting to hear back    Follow-up in 6 weeks for repeat xrays and eval        Orders Placed This Encounter    X-Ray Wrist Complete Left

## 2022-06-01 NOTE — PROGRESS NOTES
ATTENDING ADDENDUM    Araceli Cosby  was evaluated at the time of the encounter with Dr Melo PGY5.  HPI, PE and treatment plan was reviewed. Treatment plan was reasonable and necessary. Imaging was reviewed at the time of visit.

## 2022-06-14 ENCOUNTER — TELEPHONE (OUTPATIENT)
Dept: GYNECOLOGY | Facility: CLINIC | Age: 62
End: 2022-06-14
Payer: MEDICAID

## 2022-06-30 ENCOUNTER — OFFICE VISIT (OUTPATIENT)
Dept: OPHTHALMOLOGY | Facility: CLINIC | Age: 62
End: 2022-06-30
Payer: MEDICAID

## 2022-06-30 VITALS — BODY MASS INDEX: 18.66 KG/M2 | HEIGHT: 65 IN | WEIGHT: 112 LBS

## 2022-06-30 DIAGNOSIS — H01.00B BLEPHARITIS OF UPPER AND LOWER EYELIDS OF BOTH EYES, UNSPECIFIED TYPE: ICD-10-CM

## 2022-06-30 DIAGNOSIS — H01.00A BLEPHARITIS OF UPPER AND LOWER EYELIDS OF BOTH EYES, UNSPECIFIED TYPE: ICD-10-CM

## 2022-06-30 DIAGNOSIS — H02.889 MEIBOMIAN GLAND DISEASE, UNSPECIFIED LATERALITY: Primary | ICD-10-CM

## 2022-06-30 DIAGNOSIS — H10.13 ALLERGIC CONJUNCTIVITIS OF BOTH EYES: ICD-10-CM

## 2022-06-30 PROCEDURE — 99213 OFFICE O/P EST LOW 20 MIN: CPT | Mod: PBBFAC,PO | Performed by: STUDENT IN AN ORGANIZED HEALTH CARE EDUCATION/TRAINING PROGRAM

## 2022-06-30 NOTE — PROGRESS NOTES
HPI     Episodic anisocoria       Additional comments: MD to see before DFE. Still having side by side   double vision with both eyes on and off- about the same as it was last   visit (1/2022).           Last edited by Ace Trivedi MA on 6/30/2022  1:37 PM. (History)      Assessment /Plan     For exam results, see Encounter Report.    Meibomian gland disease, unspecified laterality    Blepharitis of upper and lower eyelids of both eyes, unspecified type    Allergic conjunctivitis of both eyes      1. Meibomian gland dysfunction (MGD) of both eyes with mild blepharitis  2. Allergic conjunctivitis of both eyes  - Schirmer's without anesthesia 10mm // 6mm at previous visits again on 9/29/15: 9mm // 8mm  - Punctal plugs replaced 2018  - LS/WC - blepharitis sheet given prior visit  - Chronic history of seasonal allergies, c/o itching/burning of eyes  - Continue ATs 4-6X/day, [place] BID PRN for itching    3. History bilateral nongranulomatous uveitis  - Lab NDIAYE neg in past (neg RPR, ACE, HLA B27, CBC, EVELYN, CXR)  - Pt with Raynauds  - Saw rheumatology; per patient testing has been wnl and has yearly f/u with rheum  - Reports hx of multiple episodes of zoster infections. Could be underlying cause. Consider lab testing if uveitis reoccurs  - Quiet off of PF since July 2015. Continue to monitor off of gtts - quiet today    4. Flick xt OS  - intermittent diplopia; notes usually vertical but in clinic today was horizontal   - ortho primary  - ctm    5. Episodic anisocoria  - seen prior to dilation 6/30/22; minimal asymmetry <1mm; equal dark and light  - no RAPD  - EOM full  - Longstanding  - appears physiologic    RTC 6 mo K check or PRN

## 2022-07-01 PROBLEM — H02.889 MEIBOMIAN GLAND DISEASE: Status: ACTIVE | Noted: 2022-07-01

## 2022-07-01 PROBLEM — H01.00A BLEPHARITIS OF UPPER AND LOWER EYELIDS OF BOTH EYES: Status: ACTIVE | Noted: 2022-07-01

## 2022-07-01 PROBLEM — H01.00B BLEPHARITIS OF UPPER AND LOWER EYELIDS OF BOTH EYES: Status: ACTIVE | Noted: 2022-07-01

## 2022-07-20 ENCOUNTER — HOSPITAL ENCOUNTER (OUTPATIENT)
Dept: RADIOLOGY | Facility: HOSPITAL | Age: 62
Discharge: HOME | End: 2022-07-20
Attending: STUDENT IN AN ORGANIZED HEALTH CARE EDUCATION/TRAINING PROGRAM
Payer: MEDICAID

## 2022-07-20 ENCOUNTER — OFFICE VISIT (OUTPATIENT)
Dept: ORTHOPEDICS | Facility: CLINIC | Age: 62
End: 2022-07-20
Payer: MEDICAID

## 2022-07-20 VITALS — HEIGHT: 65 IN | WEIGHT: 115 LBS | BODY MASS INDEX: 19.16 KG/M2

## 2022-07-20 DIAGNOSIS — M18.12 PRIMARY OSTEOARTHRITIS OF FIRST CARPOMETACARPAL JOINT OF LEFT HAND: ICD-10-CM

## 2022-07-20 DIAGNOSIS — M65.322 TRIGGER FINGER, LEFT INDEX FINGER: ICD-10-CM

## 2022-07-20 DIAGNOSIS — G56.02 CARPAL TUNNEL SYNDROME OF LEFT WRIST: ICD-10-CM

## 2022-07-20 DIAGNOSIS — M18.12 PRIMARY OSTEOARTHRITIS OF FIRST CARPOMETACARPAL JOINT OF LEFT HAND: Primary | ICD-10-CM

## 2022-07-20 PROCEDURE — 20550 NJX 1 TENDON SHEATH/LIGAMENT: CPT | Mod: PBBFAC | Performed by: ORTHOPAEDIC SURGERY

## 2022-07-20 PROCEDURE — 20526 THER INJECTION CARP TUNNEL: CPT | Mod: S$PBB,79,LT, | Performed by: ORTHOPAEDIC SURGERY

## 2022-07-20 PROCEDURE — 1159F MED LIST DOCD IN RCRD: CPT | Mod: CPTII,,, | Performed by: ORTHOPAEDIC SURGERY

## 2022-07-20 PROCEDURE — 20526 PR INJECT CARPAL TUNNEL: ICD-10-PCS | Mod: S$PBB,79,LT, | Performed by: ORTHOPAEDIC SURGERY

## 2022-07-20 PROCEDURE — 99024 PR POST-OP FOLLOW-UP VISIT: ICD-10-PCS | Mod: ,,, | Performed by: ORTHOPAEDIC SURGERY

## 2022-07-20 PROCEDURE — 99214 PR OFFICE/OUTPT VISIT, EST, LEVL IV, 30-39 MIN: ICD-10-PCS | Mod: 25,24,S$PBB, | Performed by: ORTHOPAEDIC SURGERY

## 2022-07-20 PROCEDURE — 20550 NJX 1 TENDON SHEATH/LIGAMENT: CPT | Mod: S$PBB,59,79,LT | Performed by: ORTHOPAEDIC SURGERY

## 2022-07-20 PROCEDURE — 20550 PR INJECT TENDON SHEATH/LIGAMENT: ICD-10-PCS | Mod: S$PBB,59,79,LT | Performed by: ORTHOPAEDIC SURGERY

## 2022-07-20 PROCEDURE — 73130 X-RAY EXAM OF HAND: CPT | Mod: TC,LT

## 2022-07-20 PROCEDURE — 99214 OFFICE O/P EST MOD 30 MIN: CPT | Mod: PBBFAC,25

## 2022-07-20 PROCEDURE — 20526 THER INJECTION CARP TUNNEL: CPT | Mod: PBBFAC | Performed by: ORTHOPAEDIC SURGERY

## 2022-07-20 PROCEDURE — 99214 OFFICE O/P EST MOD 30 MIN: CPT | Mod: 25,24,S$PBB, | Performed by: ORTHOPAEDIC SURGERY

## 2022-07-20 PROCEDURE — 3008F BODY MASS INDEX DOCD: CPT | Mod: CPTII,,, | Performed by: ORTHOPAEDIC SURGERY

## 2022-07-20 PROCEDURE — 99024 POSTOP FOLLOW-UP VISIT: CPT | Mod: ,,, | Performed by: ORTHOPAEDIC SURGERY

## 2022-07-20 PROCEDURE — 3008F PR BODY MASS INDEX (BMI) DOCUMENTED: ICD-10-PCS | Mod: CPTII,,, | Performed by: ORTHOPAEDIC SURGERY

## 2022-07-20 PROCEDURE — 1159F PR MEDICATION LIST DOCUMENTED IN MEDICAL RECORD: ICD-10-PCS | Mod: CPTII,,, | Performed by: ORTHOPAEDIC SURGERY

## 2022-07-20 RX ORDER — TRIAMCINOLONE ACETONIDE 40 MG/ML
40 INJECTION, SUSPENSION INTRA-ARTICULAR; INTRAMUSCULAR
Status: COMPLETED | OUTPATIENT
Start: 2022-07-20 | End: 2022-07-20

## 2022-07-20 RX ORDER — LIDOCAINE HYDROCHLORIDE 10 MG/ML
2 INJECTION INFILTRATION; PERINEURAL
Status: COMPLETED | OUTPATIENT
Start: 2022-07-20 | End: 2022-07-20

## 2022-07-20 RX ADMIN — TRIAMCINOLONE ACETONIDE 40 MG: 40 INJECTION, SUSPENSION INTRA-ARTICULAR; INTRAMUSCULAR at 11:07

## 2022-07-20 RX ADMIN — LIDOCAINE HYDROCHLORIDE 2 ML: 10 INJECTION INFILTRATION; PERINEURAL at 11:07

## 2022-07-20 NOTE — PROGRESS NOTES
[facility]  Established Patient Office Visit  07/20/2022       Patient ID: Araceli Cosby  YOB: 1960  MRN: [id number]      Procedure:   Left trapeziectomy and pinning of 1st metacarpal, 4/21/2022 - 3 months out    Chief Complaint: Pain of the Left Wrist and Follow-up    Araceli Cosby is a 62 y.o. 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    Last seen 6/1/2022 where her pain was improving.  An OT script was given at that time.  She has been doing OT exercises and at home and feels like she is improving but very slowly.  She's been encouraged to remove the brace at home which she has started doing.  She does mention significant index finger pain that will sometimes shoot down her hand and affects what she can do with her thumb/wrist exercises.  She also sometimes gets numbness and tingling in her index and long finger especially at night.  She says it's worse if she doesn't wear her brace.  She would like another script for PT for general conditioning as she feels she hasn't gotten back to her general level of function since surgery.    We had also referred her to Dr. Alexander for bilateral hip pain, she has not yet heard back.  We had also referred her to our GYN department here, she has not yet heard back.    Past Medical History:    Past Medical History:   Diagnosis Date    Allergies     GERD (gastroesophageal reflux disease)     Osteoporosis      Past Surgical History:   Procedure Laterality Date    APPENDECTOMY      HYSTERECTOMY       Family History   Problem Relation Age of Onset    No Known Problems Mother     No Known Problems Father     No Known Problems Sister     No Known Problems Brother     No Known Problems Maternal Aunt     No Known Problems Maternal Uncle     No Known Problems Paternal Aunt     No Known Problems Paternal Uncle     No Known Problems  Maternal Grandmother     No Known Problems Maternal Grandfather     No Known Problems Paternal Grandmother     No Known Problems Paternal Grandfather     Aneurysm Neg Hx     Cancer Neg Hx     Clotting disorder Neg Hx     Dementia Neg Hx     Diabetes Neg Hx     Fainting Neg Hx     Heart disease Neg Hx     Hyperlipidemia Neg Hx     Kidney disease Neg Hx     Liver disease Neg Hx     Migraines Neg Hx     Neuropathy Neg Hx     Obesity Neg Hx     Parkinsonism Neg Hx     Seizures Neg Hx     Stroke Neg Hx     Tremor Neg Hx      Social History     Socioeconomic History    Marital status: Single   Tobacco Use    Smoking status: Never Smoker    Smokeless tobacco: Never Used   Substance and Sexual Activity    Alcohol use: Never    Drug use: Never    Sexual activity: Not Currently     Medication List with Changes/Refills   Current Medications    AZELAIC ACID 20 % CREAM    Apply topically 2 (two) times daily.    B COMPLEX VITAMINS CAPSULE    Take 1 capsule by mouth once daily.    CALCIUM CARBONATE (TUMS) 200 MG CALCIUM (500 MG) CHEWABLE TABLET    Take 1 tablet by mouth once daily.    DICYCLOMINE (BENTYL) 10 MG CAPSULE    Take 10 mg by mouth 4 (four) times daily before meals and nightly.    FLECAINIDE (TAMBOCOR) 50 MG TAB    Take 50 mg by mouth every 12 (twelve) hours.    LORATADINE (CLARITIN) 10 MG TABLET    Take 10 mg by mouth once daily.    MONTELUKAST (SINGULAIR) 10 MG TABLET    Take 10 mg by mouth every evening.    PANTOPRAZOLE (PROTONIX) 40 MG TABLET    Take 40 mg by mouth once daily.    PREGABALIN (LYRICA) 75 MG CAPSULE    Take 75 mg by mouth 2 (two) times daily.    RANITIDINE (ZANTAC) 300 MG TABLET    Take 300 mg by mouth every evening.    SUMATRIPTAN (IMITREX) 20 MG/ACTUATION NASAL SPRAY    1 spray by Nasal route every 2 (two) hours as needed for Migraine.     Review of patient's allergies indicates:   Allergen Reactions    Acyclovir analogues     Amlodipine     Cardizem [diltiazem hcl]      Doxycycline     Hydrocodone     Hydromorphone     Iodine and iodide containing products     Keflex [cephalexin]     Metoprolol     Nsaids (non-steroidal anti-inflammatory drug)     Paxil [paroxetine hcl]     Serotonin 5ht-3 antagonists     Sulfa (sulfonamide antibiotics)     Tramadol     Wellbutrin [bupropion hcl]        ROS:    Body mass index is 19.14 kg/m².  GENERAL: Well appearing, appropriate for stated age, no acute distress.  CARDIOVASCULAR: Pulses regular by peripheral palpation.  PULMONARY: Respirations are even and non-labored.  NEURO: Awake, alert, and oriented x 3.  PSYCH: Mood & affect are appropriate.  HEENT: Head is normocephalic and atraumatic.    Physical Exam:    Left arm:  Incision well healed  +FPL, EPL  SILT M/U/R  +ain/pin/ulnar  2+ RP  + tinel's over carpal tunnel  TTP over A1 pulley index finger  Triggering of index finger    Imaging:  Xray left hand:  Patient is status post trapeziectomy small ridge of bone remaining.  Pin previously removed.  Maintained height of 1st metacarpal.    Assessment and Plan:    Araceli Cosby is a 62 y.o. 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    - Agree with activity progression per her OT  - New Rx for general deconditioning for PT given today  - Believe she has a left index trigger finger and left carpal tunnel syndrome that may be contributing to her hand and finger pain.  Offered injection today and she would like to try this.  - Offered to place a new hip referral to Dr. Haas in [place] which may be quicker, she accepted.  - Recommended she check in with the GYN department clinic here about her referral  - Follow up in 2 months    Orders Placed This Encounter    X-Ray Hand Complete Left    Ambulatory referral/consult to Orthopedics    triamcinolone acetonide injection 40 mg    LIDOcaine HCL 10 mg/ml (1%) injection 2 mL      [facility] Procedure Note      Date: 07/20/2022  Time:  9:50 AM CDT     Procedure: left index  A1 pulley (trigger finger) AND carpal tunnel, left injection     Indications: Therapeutic Indication - Decrease pain, Increase range of motion and improve quality of life:   Risks:  Possible complications with the injection include bleeding, infection (.01%), tendon rupture, steroid flare, fat pad or soft tissue atrophy, skin depigmentation, allergic reaction to medications and vasovagal response. (steroid flare treatment is rest, ice, NSAIDs and resolves in 24-36 hours.)  Consent:  Procedure, risks, benefits, and alternatives explained the patient, who voiced understanding and agreed to proceed with procedure.  Consent signed and scanned into the medical record.   No absolute contraindications (cellulitis overlying joint, infection, lack of informed consent, allergy to injection medication, AVN protein or egg allergy for sodium hyaluronate, or history of steroid flare) or relative contraindications (uncontrolled DM2 A1c>10, coagulopathy, INR > 3.5, previous joint replacement or history of AVN).        Staff: Dr Jose Winslow  Description:  Time-out performed.  The patient was prepped in normal sterile fashion use of chlorhexidine scrub and the appropriate and anatomic landmarks were identified. Topical ethyl chloride was applied. Contents of syringe included: 2cc of 1% lidocaine with 40mg of Kenalog, this was split with half the contents injected into the carpal tunnel and half into the L index A1 pulley  Complications: None   EBL: None   Post Procedure: Patient alert, and moving all extremities. ROM improved, pain decreased.  Good peripheral pulses, no signs of vascular compromise and range of motion intact.  Aftercare instructions were given to patient at time of discharge.  Relative rest for 3 days-avoiding excess activity.  Place ice on the area for 15 minutes every 4-6 hours. Patient may take Tylenol a 1000 mg b.i.d. or ibuprofen 600 mg  t.i.d. for the next 3-4 days if not on medication already and safe to take pending co-morbidities.  Protect the area for the next 1-8 hours if anesthetic was used.  Avoid excessive activity for the next 3-4 weeks.  ER precautions given for fever, severe joint pain or allergic reaction or other new symptoms related to the joint injection.       Chaparrita Chavez MD, PGY-5  [facility]

## 2022-09-16 DIAGNOSIS — E04.1 THYROID NODULE: Primary | ICD-10-CM

## 2022-09-16 DIAGNOSIS — R53.83 OTHER FATIGUE: ICD-10-CM

## 2022-09-21 ENCOUNTER — HOSPITAL ENCOUNTER (OUTPATIENT)
Dept: RADIOLOGY | Facility: HOSPITAL | Age: 62
Discharge: HOME | End: 2022-09-21
Attending: NURSE PRACTITIONER
Payer: MEDICAID

## 2022-09-21 ENCOUNTER — OFFICE VISIT (OUTPATIENT)
Dept: ORTHOPEDICS | Facility: CLINIC | Age: 62
End: 2022-09-21
Payer: MEDICAID

## 2022-09-21 VITALS — BODY MASS INDEX: 18.66 KG/M2 | WEIGHT: 112 LBS | HEIGHT: 65 IN

## 2022-09-21 DIAGNOSIS — E04.1 THYROID NODULE: ICD-10-CM

## 2022-09-21 DIAGNOSIS — M16.0 BILATERAL HIP JOINT ARTHRITIS: ICD-10-CM

## 2022-09-21 DIAGNOSIS — M18.12 PRIMARY OSTEOARTHRITIS OF FIRST CARPOMETACARPAL JOINT OF LEFT HAND: ICD-10-CM

## 2022-09-21 DIAGNOSIS — G56.02 CARPAL TUNNEL SYNDROME ON LEFT: ICD-10-CM

## 2022-09-21 DIAGNOSIS — M65.322 TRIGGER FINGER, LEFT INDEX FINGER: Primary | ICD-10-CM

## 2022-09-21 DIAGNOSIS — R53.83 OTHER FATIGUE: ICD-10-CM

## 2022-09-21 DIAGNOSIS — M54.16 LUMBAR RADICULOPATHY: ICD-10-CM

## 2022-09-21 PROCEDURE — 76536 US EXAM OF HEAD AND NECK: CPT | Mod: TC

## 2022-09-21 PROCEDURE — 1159F PR MEDICATION LIST DOCUMENTED IN MEDICAL RECORD: ICD-10-PCS | Mod: CPTII,,, | Performed by: ORTHOPAEDIC SURGERY

## 2022-09-21 PROCEDURE — 99214 OFFICE O/P EST MOD 30 MIN: CPT | Mod: S$PBB,,, | Performed by: ORTHOPAEDIC SURGERY

## 2022-09-21 PROCEDURE — 99215 OFFICE O/P EST HI 40 MIN: CPT | Mod: PBBFAC

## 2022-09-21 PROCEDURE — 1159F MED LIST DOCD IN RCRD: CPT | Mod: CPTII,,, | Performed by: ORTHOPAEDIC SURGERY

## 2022-09-21 PROCEDURE — 3008F BODY MASS INDEX DOCD: CPT | Mod: CPTII,,, | Performed by: ORTHOPAEDIC SURGERY

## 2022-09-21 PROCEDURE — 99214 PR OFFICE/OUTPT VISIT, EST, LEVL IV, 30-39 MIN: ICD-10-PCS | Mod: S$PBB,,, | Performed by: ORTHOPAEDIC SURGERY

## 2022-09-21 PROCEDURE — 3008F PR BODY MASS INDEX (BMI) DOCUMENTED: ICD-10-PCS | Mod: CPTII,,, | Performed by: ORTHOPAEDIC SURGERY

## 2022-09-21 RX ORDER — HYDROCORTISONE 25 MG/G
CREAM TOPICAL
COMMUNITY
Start: 2022-01-12

## 2022-09-21 RX ORDER — CHLORZOXAZONE 500 MG/1
TABLET ORAL
COMMUNITY
Start: 2022-07-29

## 2022-09-21 RX ORDER — LEVOCETIRIZINE DIHYDROCHLORIDE 5 MG/1
5 TABLET, FILM COATED ORAL DAILY
Status: ON HOLD | COMMUNITY
Start: 2022-09-15 | End: 2023-10-12 | Stop reason: HOSPADM

## 2022-09-21 RX ORDER — KETOTIFEN FUMARATE 0.35 MG/ML
1 SOLUTION/ DROPS OPHTHALMIC 2 TIMES DAILY
COMMUNITY

## 2022-09-21 NOTE — PROGRESS NOTES
ATTENDING ADDENDUM    Araceli Cosby  was evaluated at the time of the encounter with Dr Chavez PGY5.  HPI, PE and treatment plan was reviewed. Treatment plan was reasonable and necessary. Imaging was reviewed at the time of visit.     I was present during critical or key resident-provided service and procedure portions of the visit.

## 2022-09-21 NOTE — PATIENT INSTRUCTIONS
- Only 2 months since prior carpal tunnel/trigger injection so cannot have repeat injection today.  - Referral placed for Dr. Rojas in [place] for LUE EMG  - New OT script given for hand stretching/strengthening  - New referral placed for Dr. Haas in [place] for her hips  - New referral placed for [facility] spine department (encouraged her to keep her appointment with Neurosurgery as they may be able to help her as well/instead of ortho)  - Regarding GYN, recommended again that she go in person to their clinic here today to check on her referral.  - Regarding her need for a PCP, recommended she ask her upcoming pulmonology appointment for a recommendation for a general internist to be her PCP due to her increasing age and complexity of medical problems.  Also recommend she reach out to her insurance for options of available physicians.  - Follow up with us after EMG completed

## 2022-09-21 NOTE — PROGRESS NOTES
"  [facility]  Established Patient Office Visit  09/21/2022       Patient ID: Araceli Cosby  YOB: 1960  MRN: [id number]      Procedure:   Left trapeziectomy and pinning of 1st metacarpal, 4/21/2022 - 5 months out    Chief Complaint: Pain of the Left Hand and Follow-up    Araceli Cosby is a 62 y.o. 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    We last saw her 7/20/2022, 3 months out from her surgery.  She had wanted a new PT script which we gave her.  We recommend she stop using her brace to work on more ROM.  We also thought she was having a lot of pain from L index finger triggering so we performed an injection.  We also placed a new referral for her hip pain for Dr. Haas in [place].    She is here today for follow up.  She says the trigger finger/carpal tunnel injection took some time to set in but then did help.  She feels it coming back now however.  She says she still feels like she is weak in her left hand and is also having numbness and tingling in her index finger and sometimes the other fingers.  Last time she had an EMG was >10 years ago.  Recommend an EMG to evaluate for CTS due to multiple possible sites of compression with her history.    She says she stopped going to OT because she had worsening of her back pain and couldn't tolerate the drive, but has kept doing the exercises at home.  She would like a new script for OT.    Also mentions that she was previously getting ESIs in her back for her back pain but was dropped from the group that was seeing her in [place].  She says she has an appointment coming up in [place] with Neurosurgery but is worried that if it's a bone problem they will "send her on her way".  Reassured her that they ought to be able to refer her to the ortho side there if needed but she wasn't confident.  Offered to place a " referral to [facility] for the spine department.      Also mentions that she hasn't heard from Dr. Haas's office in [place] about her hips.  Offered to place a new referral.    Also asks about the gyn department saying she still hasn't heard from them, recommended again that she go in person to their clinic here today to check on her referral.    Becomes tearful saying that she has so much going on (also brings up her thyroid and recent blood work) and doesn't know who to turn to about these things.  Has been seeing an NP as her primary care but has an appointment with a pulmonologist coming up next week.  Recommended she ask the pulmonogist for a recommendation for a general internist to be her PCP due to her increasing age and complexity of medical problems.  Also recommend she reach out to her insurance for options of available physicians.    Past Medical History:    Past Medical History:   Diagnosis Date    Allergies     GERD (gastroesophageal reflux disease)     Osteoporosis      Past Surgical History:   Procedure Laterality Date    APPENDECTOMY      HYSTERECTOMY       Family History   Problem Relation Age of Onset    No Known Problems Mother     No Known Problems Father     No Known Problems Sister     No Known Problems Brother     No Known Problems Maternal Aunt     No Known Problems Maternal Uncle     No Known Problems Paternal Aunt     No Known Problems Paternal Uncle     No Known Problems Maternal Grandmother     No Known Problems Maternal Grandfather     No Known Problems Paternal Grandmother     No Known Problems Paternal Grandfather     Aneurysm Neg Hx     Cancer Neg Hx     Clotting disorder Neg Hx     Dementia Neg Hx     Diabetes Neg Hx     Fainting Neg Hx     Heart disease Neg Hx     Hyperlipidemia Neg Hx     Kidney disease Neg Hx     Liver disease Neg Hx     Migraines Neg Hx     Neuropathy Neg Hx     Obesity Neg Hx     Parkinsonism Neg Hx     Seizures Neg Hx     Stroke Neg Hx      Tremor Neg Hx      Social History     Socioeconomic History    Marital status: Single   Tobacco Use    Smoking status: Never    Smokeless tobacco: Never   Substance and Sexual Activity    Alcohol use: Never    Drug use: Never    Sexual activity: Not Currently     Medication List with Changes/Refills   Current Medications    AZELAIC ACID 20 % CREAM    Apply topically 2 (two) times daily.    B COMPLEX VITAMINS CAPSULE    Take 1 capsule by mouth once daily.    CALCIUM CARBONATE (TUMS) 200 MG CALCIUM (500 MG) CHEWABLE TABLET    Take 1 tablet by mouth once daily.    DICYCLOMINE (BENTYL) 10 MG CAPSULE    Take 10 mg by mouth 4 (four) times daily before meals and nightly.    FLECAINIDE (TAMBOCOR) 50 MG TAB    Take 50 mg by mouth every 12 (twelve) hours.    LORATADINE (CLARITIN) 10 MG TABLET    Take 10 mg by mouth once daily.    MONTELUKAST (SINGULAIR) 10 MG TABLET    Take 10 mg by mouth every evening.    PANTOPRAZOLE (PROTONIX) 40 MG TABLET    Take 40 mg by mouth once daily.    PREGABALIN (LYRICA) 75 MG CAPSULE    Take 75 mg by mouth 2 (two) times daily.    RANITIDINE (ZANTAC) 300 MG TABLET    Take 300 mg by mouth every evening.    SUMATRIPTAN (IMITREX) 20 MG/ACTUATION NASAL SPRAY    1 spray by Nasal route every 2 (two) hours as needed for Migraine.     Review of patient's allergies indicates:   Allergen Reactions    Acyclovir analogues     Amlodipine     Cardizem [diltiazem hcl]     Doxycycline     Hydrocodone     Hydromorphone     Iodine and iodide containing products     Keflex [cephalexin]     Metoprolol     Nsaids (non-steroidal anti-inflammatory drug)     Paxil [paroxetine hcl]     Serotonin 5ht-3 antagonists     Sulfa (sulfonamide antibiotics)     Tramadol     Wellbutrin [bupropion hcl]        ROS:    There is no height or weight on file to calculate BMI.  GENERAL: Well appearing, appropriate for stated age, no acute distress.  CARDIOVASCULAR: Pulses regular by peripheral palpation.  PULMONARY: Respirations are even  and non-labored.  NEURO: Awake, alert, and oriented x 3.  PSYCH: Mood & affect are appropriate.  HEENT: Head is normocephalic and atraumatic.    Physical Exam:    Left arm:   Incision well healed  +FPL, EPL  SILT M/U/R  +ain/pin/ulnar  2+ RP  ? tinel's over carpal tunnel, pain is at site of tapping, does not shoot into fingers  TTP over A1 pulley index finger  No active triggering of index finger today    Imaging:  Xray left hand:  Patient is status post trapeziectomy small ridge of bone remaining.  Pin previously removed.  Maintained height of 1st metacarpal.    Assessment and Plan:    Araceli Cosby is a 62 y.o. 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  - Only 2 months since prior carpal tunnel/trigger injection so cannot have repeat injection today.  - Referral placed for Dr. Rojas in [place] for LUE EMG  - New OT script given for hand stretching/strengthening  - New referral placed for Dr. Haas in [place] for her hips  - New referral placed for [facility] spine department (encouraged her to keep her appointment with Neurosurgery as they may be able to help her as well/instead of ortho)  - Regarding GYN, recommended again that she go in person to their clinic here today to check on her referral.  - Regarding her need for a PCP, recommended she ask her upcoming pulmonology appointment for a recommendation for a general internist to be her PCP due to her increasing age and complexity of medical problems.  Also recommend she reach out to her insurance for options of available physicians.  - Follow up with us after EMG completed            Chaparrita Chavez MD, PGY-5  [facility]

## 2022-09-22 DIAGNOSIS — M80.00XG: Primary | ICD-10-CM

## 2022-09-22 DIAGNOSIS — E83.52 SERUM CALCIUM ELEVATED: ICD-10-CM

## 2022-09-22 DIAGNOSIS — E04.1 THYROID NODULE: ICD-10-CM

## 2022-09-22 DIAGNOSIS — R74.8 ELEVATED SERUM ALKALINE PHOSPHATASE LEVEL: ICD-10-CM

## 2022-11-22 ENCOUNTER — LAB VISIT (OUTPATIENT)
Dept: LAB | Facility: HOSPITAL | Age: 62
End: 2022-11-22
Attending: NURSE PRACTITIONER
Payer: MEDICAID

## 2022-11-22 ENCOUNTER — OFFICE VISIT (OUTPATIENT)
Dept: ENDOCRINOLOGY | Facility: CLINIC | Age: 62
End: 2022-11-22
Payer: MEDICAID

## 2022-11-22 VITALS
WEIGHT: 114.63 LBS | BODY MASS INDEX: 18.42 KG/M2 | HEART RATE: 71 BPM | SYSTOLIC BLOOD PRESSURE: 139 MMHG | RESPIRATION RATE: 20 BRPM | HEIGHT: 66 IN | TEMPERATURE: 98 F | DIASTOLIC BLOOD PRESSURE: 82 MMHG

## 2022-11-22 DIAGNOSIS — E83.52 SERUM CALCIUM ELEVATED: ICD-10-CM

## 2022-11-22 DIAGNOSIS — E04.2 MULTINODULAR GOITER (NONTOXIC): Primary | ICD-10-CM

## 2022-11-22 DIAGNOSIS — M80.00XG: ICD-10-CM

## 2022-11-22 DIAGNOSIS — E04.2 MULTINODULAR GOITER (NONTOXIC): ICD-10-CM

## 2022-11-22 DIAGNOSIS — E04.1 THYROID NODULE: ICD-10-CM

## 2022-11-22 LAB
ALBUMIN SERPL-MCNC: 4.5 GM/DL (ref 3.4–4.8)
BUN SERPL-MCNC: 7.7 MG/DL (ref 9.8–20.1)
CALCIUM SERPL-MCNC: 10.1 MG/DL (ref 8.4–10.2)
CHLORIDE SERPL-SCNC: 102 MMOL/L (ref 98–107)
CO2 SERPL-SCNC: 26 MMOL/L (ref 23–31)
CREAT SERPL-MCNC: 0.66 MG/DL (ref 0.55–1.02)
DEPRECATED CALCIDIOL+CALCIFEROL SERPL-MC: 90.2 NG/ML (ref 30–80)
GFR SERPLBLD CREATININE-BSD FMLA CKD-EPI: >60 MLS/MIN/1.73/M2
GLUCOSE SERPL-MCNC: 136 MG/DL (ref 82–115)
MAGNESIUM SERPL-MCNC: 1.8 MG/DL (ref 1.6–2.6)
PHOSPHATE SERPL-MCNC: 2.5 MG/DL (ref 2.3–4.7)
POTASSIUM SERPL-SCNC: 3.4 MMOL/L (ref 3.5–5.1)
PTH-INTACT SERPL-MCNC: 112.3 PG/ML (ref 8.7–77)
SODIUM SERPL-SCNC: 140 MMOL/L (ref 136–145)
T3FREE SERPL-MCNC: 2.98 PG/ML (ref 1.57–3.91)
T4 FREE SERPL-MCNC: 1.35 NG/DL (ref 0.7–1.48)
TSH SERPL-ACNC: 0.51 UIU/ML (ref 0.35–4.94)

## 2022-11-22 PROCEDURE — 99204 PR OFFICE/OUTPT VISIT, NEW, LEVL IV, 45-59 MIN: ICD-10-PCS | Mod: S$PBB,,, | Performed by: NURSE PRACTITIONER

## 2022-11-22 PROCEDURE — 1159F MED LIST DOCD IN RCRD: CPT | Mod: CPTII,,, | Performed by: NURSE PRACTITIONER

## 2022-11-22 PROCEDURE — 3008F BODY MASS INDEX DOCD: CPT | Mod: CPTII,,, | Performed by: NURSE PRACTITIONER

## 2022-11-22 PROCEDURE — 83735 ASSAY OF MAGNESIUM: CPT

## 2022-11-22 PROCEDURE — 84481 FREE ASSAY (FT-3): CPT

## 2022-11-22 PROCEDURE — 83970 ASSAY OF PARATHORMONE: CPT

## 2022-11-22 PROCEDURE — 3075F SYST BP GE 130 - 139MM HG: CPT | Mod: CPTII,,, | Performed by: NURSE PRACTITIONER

## 2022-11-22 PROCEDURE — 3079F DIAST BP 80-89 MM HG: CPT | Mod: CPTII,,, | Performed by: NURSE PRACTITIONER

## 2022-11-22 PROCEDURE — 82306 VITAMIN D 25 HYDROXY: CPT

## 2022-11-22 PROCEDURE — 3079F PR MOST RECENT DIASTOLIC BLOOD PRESSURE 80-89 MM HG: ICD-10-PCS | Mod: CPTII,,, | Performed by: NURSE PRACTITIONER

## 2022-11-22 PROCEDURE — 36415 COLL VENOUS BLD VENIPUNCTURE: CPT

## 2022-11-22 PROCEDURE — 99215 OFFICE O/P EST HI 40 MIN: CPT | Mod: PBBFAC | Performed by: NURSE PRACTITIONER

## 2022-11-22 PROCEDURE — 84439 ASSAY OF FREE THYROXINE: CPT

## 2022-11-22 PROCEDURE — 3008F PR BODY MASS INDEX (BMI) DOCUMENTED: ICD-10-PCS | Mod: CPTII,,, | Performed by: NURSE PRACTITIONER

## 2022-11-22 PROCEDURE — 99204 OFFICE O/P NEW MOD 45 MIN: CPT | Mod: S$PBB,,, | Performed by: NURSE PRACTITIONER

## 2022-11-22 PROCEDURE — 3075F PR MOST RECENT SYSTOLIC BLOOD PRESS GE 130-139MM HG: ICD-10-PCS | Mod: CPTII,,, | Performed by: NURSE PRACTITIONER

## 2022-11-22 PROCEDURE — 84443 ASSAY THYROID STIM HORMONE: CPT

## 2022-11-22 PROCEDURE — 80069 RENAL FUNCTION PANEL: CPT

## 2022-11-22 PROCEDURE — 1160F RVW MEDS BY RX/DR IN RCRD: CPT | Mod: CPTII,,, | Performed by: NURSE PRACTITIONER

## 2022-11-22 PROCEDURE — 1159F PR MEDICATION LIST DOCUMENTED IN MEDICAL RECORD: ICD-10-PCS | Mod: CPTII,,, | Performed by: NURSE PRACTITIONER

## 2022-11-22 PROCEDURE — 1160F PR REVIEW ALL MEDS BY PRESCRIBER/CLIN PHARMACIST DOCUMENTED: ICD-10-PCS | Mod: CPTII,,, | Performed by: NURSE PRACTITIONER

## 2022-11-22 RX ORDER — LACTULOSE 10 G/15ML
SOLUTION ORAL; RECTAL
COMMUNITY
Start: 2022-08-23

## 2022-11-22 RX ORDER — MEMANTINE HYDROCHLORIDE 10 MG/1
5 TABLET ORAL 2 TIMES DAILY
COMMUNITY
Start: 2022-07-28 | End: 2022-11-27

## 2022-11-22 RX ORDER — LANOLIN ALCOHOL/MO/W.PET/CERES
500 CREAM (GRAM) TOPICAL 2 TIMES DAILY
COMMUNITY

## 2022-11-22 RX ORDER — CARBAMAZEPINE 100 MG/1
100 TABLET, CHEWABLE ORAL
COMMUNITY
Start: 2021-12-08

## 2022-11-22 RX ORDER — MELOXICAM 15 MG/1
15 TABLET ORAL
COMMUNITY
Start: 2021-12-08 | End: 2022-11-27

## 2022-11-22 RX ORDER — TIZANIDINE 4 MG/1
4 TABLET ORAL
COMMUNITY
End: 2022-11-27

## 2022-11-22 RX ORDER — METHOCARBAMOL 750 MG/1
750 TABLET, FILM COATED ORAL
COMMUNITY
Start: 2021-12-08 | End: 2022-11-27

## 2022-11-22 RX ORDER — PRIMIDONE 50 MG/1
50 TABLET ORAL
COMMUNITY
Start: 2021-06-28 | End: 2022-11-27

## 2022-11-22 RX ORDER — IPRATROPIUM BROMIDE 42 UG/1
SPRAY, METERED NASAL
COMMUNITY
Start: 2021-12-08 | End: 2022-11-27

## 2022-11-22 RX ORDER — AZELASTINE HCL 205.5 UG/1
205.5 SPRAY NASAL DAILY PRN
COMMUNITY

## 2022-11-22 RX ORDER — CYCLOSPORINE 0.5 MG/ML
1 EMULSION OPHTHALMIC
COMMUNITY
Start: 2021-06-28 | End: 2024-01-30 | Stop reason: SDUPTHER

## 2022-11-22 RX ORDER — NEOMYCIN SULFATE, POLYMYXIN B SULFATE, AND DEXAMETHASONE 3.5; 10000; 1 MG/G; [USP'U]/G; MG/G
OINTMENT OPHTHALMIC
Status: ON HOLD | COMMUNITY
Start: 2022-01-12 | End: 2023-10-12 | Stop reason: HOSPADM

## 2022-11-22 RX ORDER — FLUTICASONE PROPIONATE 50 UG/1
1 POWDER, METERED RESPIRATORY (INHALATION)
COMMUNITY
End: 2022-11-27

## 2022-11-22 RX ORDER — NYSTATIN AND TRIAMCINOLONE ACETONIDE 100000; 1 [USP'U]/G; MG/G
OINTMENT TOPICAL 2 TIMES DAILY
COMMUNITY
End: 2022-11-27

## 2022-11-22 RX ORDER — HYOSCYAMINE SULFATE 0.125 MG
0.12 TABLET ORAL EVERY 4 HOURS PRN
COMMUNITY
End: 2022-11-27

## 2022-11-22 RX ORDER — MULTIVIT WITH IRON,MINERALS
TABLET ORAL 3 TIMES DAILY
Status: ON HOLD | COMMUNITY
End: 2023-10-12 | Stop reason: HOSPADM

## 2022-11-22 RX ORDER — MIDODRINE HYDROCHLORIDE 2.5 MG/1
2.5 TABLET ORAL EVERY MORNING
COMMUNITY
Start: 2022-09-12 | End: 2022-11-27

## 2022-11-22 RX ORDER — AMANTADINE HYDROCHLORIDE 100 MG/1
100 CAPSULE, GELATIN COATED ORAL
COMMUNITY
Start: 2021-12-08 | End: 2022-11-27

## 2022-11-22 NOTE — PROGRESS NOTES
Subjective:       Patient ID: Araceli Cosby is a 62 y.o. female.    Chief Complaint: Osteoporosis (New patient referral )    Endocrine clinic note 11/22/2022:  62-year-old female scheduled today as new patient referral to endocrine clinic for nontoxic multinodular goiter, osteoporosis .  Patient had ultrasound on 09/16/2022 There is a cystic lesion seen in the right thyroid in the upper pole that measures 3 mm x 3 mm.  There is a mix attenuation nodule in the midpole that measures 4 mm x 4 mm.  There is another cystic area seen in the lower pole that measures 4 mm by 4 mm.  There are multiple areas of nodularity seen in the left thyroid.  Largest nodule measures 6 mm x 5 mm and is seen in the midpole.  Patient reports history of severe osteoporosis states she refused medication in the past due to not working to help her bones.  No bone density on patient's referral will request patient's bone density.  Patient reports T11 compression fracture and loss of multiple inches with severe curvature of the spine.  Patient states she feels this is related to of bone disorder she states she is not had testing in the past but her mother did have early bone loss and bone disorder but she is unsure what type.    Result Notes  Details      Reading Physician Reading Date Result Priority  Aaron Cardenas MD  [phone] 9/21/2022 Routine    Narrative & Impression  EXAMINATION:  US THYROID     CLINICAL HISTORY:  Nontoxic single thyroid nodule     TECHNIQUE:  Ultrasound of the thyroid and cervical lymph nodes was performed.     COMPARISON:  None.     FINDINGS:  The right thyroid measures 5.1 x 1.4 x 1.6 cm and left thyroid measures 4.5 x 1 x 1.2 cm.  There are multiple nodules seen in both thyroid lobes.  Isthmus measures 1.2 mm.  The echotexture of the thyroid is diffusely heterogeneous bilaterally.  There is a cystic lesion seen in the right thyroid in the upper pole that measures 3 mm x 3 mm.  There is a mix  attenuation nodule in the midpole that measures 4 mm x 4 mm.  There is another cystic area seen in the lower pole that measures 4 mm by 4 mm.  There are multiple areas of nodularity seen in the left thyroid.  Largest nodule measures 6 mm x 5 mm and is seen in the midpole.  To both lobes appears normal.     Impression:     Multinodular goiter bilaterally        Electronically signed by: Aaron Cardenas  Date:                                            09/21/2022  Time:                                           15:02        Review of Systems   Constitutional:  Negative for activity change, appetite change and fatigue.   HENT:  Negative for dental problem, hearing loss, tinnitus, trouble swallowing and goiter.    Eyes:  Negative for photophobia, pain and visual disturbance.   Respiratory:  Negative for cough, chest tightness and wheezing.    Cardiovascular:  Negative for chest pain, palpitations and leg swelling.   Gastrointestinal:  Negative for abdominal pain, constipation, diarrhea, nausea and reflux.   Endocrine: Negative for cold intolerance, heat intolerance, polydipsia and polyphagia.   Genitourinary:  Negative for difficulty urinating, flank pain, hematuria, hot flashes, menstrual irregularity, menstrual problem, nocturia and urgency.   Musculoskeletal:  Negative for back pain, gait problem, joint swelling, leg pain and joint deformity.   Integumentary:  Negative for color change, pallor, rash and breast discharge.   Allergic/Immunologic: Negative for environmental allergies, food allergies and immunocompromised state.   Neurological:  Negative for tremors, seizures, headaches, coordination difficulties, memory loss and coordination difficulties.   Psychiatric/Behavioral:  Negative for agitation, behavioral problems and sleep disturbance. The patient is not nervous/anxious.        Objective:      Physical Exam  Constitutional:       General: She is not in acute distress.     Appearance: Normal appearance.  She is not ill-appearing.   HENT:      Head: Normocephalic and atraumatic.      Right Ear: External ear normal.      Left Ear: External ear normal.      Nose: Nose normal. No congestion or rhinorrhea.      Mouth/Throat:      Mouth: Mucous membranes are moist.      Pharynx: Oropharynx is clear. No oropharyngeal exudate.   Eyes:      General:         Right eye: No discharge.         Left eye: No discharge.      Conjunctiva/sclera: Conjunctivae normal.      Pupils: Pupils are equal, round, and reactive to light.   Neck:      Thyroid: No thyroid mass, thyromegaly or thyroid tenderness.   Cardiovascular:      Rate and Rhythm: Normal rate and regular rhythm.      Pulses: Normal pulses.      Heart sounds: Normal heart sounds. No murmur heard.  Pulmonary:      Effort: Pulmonary effort is normal. No respiratory distress.      Breath sounds: Normal breath sounds.   Abdominal:      General: Abdomen is flat. Bowel sounds are normal. There is no distension.      Palpations: Abdomen is soft.      Tenderness: There is no abdominal tenderness.   Musculoskeletal:         General: No swelling or tenderness. Normal range of motion.      Cervical back: Normal range of motion and neck supple. No tenderness.      Right lower leg: No edema.      Left lower leg: No edema.   Feet:      Right foot:      Skin integrity: Skin integrity normal.      Left foot:      Skin integrity: Skin integrity normal.   Lymphadenopathy:      Cervical: No cervical adenopathy.   Skin:     General: Skin is warm and dry.      Coloration: Skin is not jaundiced or pale.   Neurological:      General: No focal deficit present.      Mental Status: She is alert and oriented to person, place, and time. Mental status is at baseline.      Coordination: Coordination normal.      Gait: Gait normal.   Psychiatric:         Mood and Affect: Mood normal.         Behavior: Behavior normal.         Thought Content: Thought content normal.       Assessment:       Problem List  Items Addressed This Visit    None  Visit Diagnoses       Multinodular goiter (nontoxic)    -  Primary    Relevant Orders    TSH    T4, Free    T3, Free ([facility])    Serum calcium elevated        Relevant Orders    Renal Function Panel    Magnesium    PTH, Intact    Vitamin D    Age-related osteoporosis with current pathol fx with delayed healing        Relevant Orders    Vitamin D    Thyroid nodule                Plan:       Multinodular goiter (nontoxic)  Ultrasound 09/21/2022  Largest nodule 6 mm x 5 mm seen in midpole  Multi nodules none meeting criteria for FNA  Repeat follow-up ultrasound 1-2 years  -     TSH; Future; Expected date: 11/22/2022  -     T4, Free; Future; Expected date: 11/22/2022  -     T3, Free ([facility]); Future; Expected date: 11/22/2022  TPO <1.0 pm 07/28/2022 on referral   Component Ref Range & Units 2 mo ago 5 yr ago   Thyroid Stimulating Hormone 0.3500 - 4.9400 uIU/mL 0.9350  0.529 R      Component Ref Range & Units 2 mo ago    Thyroxine Free 0.70 - 1.48 ng/dL 0.94      Serum calcium elevated  Vit D level 60.7 on 03/14/2022 on referral  Calcium 10.7 on 07/26/2022   Repeat renal panel, magnesium, PTH and vitamin-D level today  -     Ambulatory referral/consult to Endocrinology  -     Renal Function Panel; Future; Expected date: 11/22/2022  -     Magnesium; Future; Expected date: 11/22/2022  -     PTH, Intact; Future; Expected date: 11/22/2022  -     Vitamin D; Future; Expected date: 11/22/2022    Age-related osteoporosis with current pathol fx with delayed healing  Request recent bone density  -     Ambulatory referral/consult to Endocrinology  -     Vitamin D; Future; Expected date: 11/22/2022    Thyroid nodule  -     Ambulatory referral/consult to Endocrinology      Reviewed patient's previous records reviewed 50 pages  I spent a total of 45 minutes on the day of the visit.  This includes face to face time and non-face to face time preparing to see the patient (eg, review of tests), obtaining  and/or reviewing separately obtained history, documenting clinical information in the electronic or other health record, independently interpreting results and communicating results to the patient/family/caregiver, or care coordinator.

## 2022-12-07 ENCOUNTER — OFFICE VISIT (OUTPATIENT)
Dept: GYNECOLOGY | Facility: CLINIC | Age: 62
End: 2022-12-07
Payer: MEDICAID

## 2022-12-07 VITALS
RESPIRATION RATE: 18 BRPM | OXYGEN SATURATION: 98 % | BODY MASS INDEX: 18.99 KG/M2 | HEIGHT: 65 IN | WEIGHT: 114 LBS | HEART RATE: 84 BPM | DIASTOLIC BLOOD PRESSURE: 71 MMHG | TEMPERATURE: 98 F | SYSTOLIC BLOOD PRESSURE: 137 MMHG

## 2022-12-07 DIAGNOSIS — R10.2 CHRONIC PELVIC PAIN IN FEMALE: ICD-10-CM

## 2022-12-07 DIAGNOSIS — G89.29 CHRONIC PELVIC PAIN IN FEMALE: ICD-10-CM

## 2022-12-07 DIAGNOSIS — N30.10 INTERSTITIAL CYSTITIS: Primary | ICD-10-CM

## 2022-12-07 PROCEDURE — 99215 OFFICE O/P EST HI 40 MIN: CPT | Mod: PBBFAC

## 2022-12-07 RX ORDER — OXYCODONE HYDROCHLORIDE AND ACETAMINOPHEN 325; 5 MG/5ML; MG/5ML
SOLUTION ORAL
COMMUNITY

## 2022-12-07 NOTE — PROGRESS NOTES
"[facility] CLINIC NOTE  [facility]  [address] [place] [address]  Phone: [phone]  Fax: [phone]    Subjective:     Araceli Cosby is a 62 y.o. G0 who presents to establish GYN care. Pt is s/p CHAY/BSO in 1980s 2/2 Pelvic Pain and "ovarian cysts". Denies any h/o GYN cancer. Reports being diagnosed with interstitial cystitis in 1999 and adheres to the  diet. Also states she has a 30+ year of pelvic pain, mainly notes after prolonged activity. Not currently sexually active.     She is also followed by Orthopedics for UE issues. She was referred to a provider in [place] for her hip/spine pain at her last visit in 9/2022. Per chart review, pt also has a h/o recent pelvic fractures that pt reports she never underwent surgical management for. The most recent pelvic US (5/2022) was notable for healing pubic rami fractures.     Pt is followed by Libby Lance (Endocrinology NP) for osteoporosis and multinodular goiter. At the last visit in 11/2022, plan was to repeat DEXA Scan and then consider initiation of bisphonates. Pt is currently on Calcium and Vitamin D. Pt's PCP is Sommer Theodore in [place], [place] who follows for HCM.    Of note, pt follows with Neurosurgery for management of severe right levoscoliosis, T11 compression fracture, and lower back pain. At the last visit in 10/2022, no acute neurosurgical intervention was deemed necessary and the plan was to continue wearing back brace.     Allergies: See Chart  OBHx:G0  GynHx:   s/p CHAY/BSO in 1980s  H/o HSV. Denies recent h/o STIs    MedHx:   Past Medical History:   Diagnosis Date    Allergies     Fibromyalgia     GERD (gastroesophageal reflux disease)     Interstitial cystitis     Muscle disease     Osteoporosis        SurgHx:   Past Surgical History:   Procedure Laterality Date    APPENDECTOMY      CARPAL TUNNEL RELEASE Right     FINGER SURGERY N/A     left thumb    HYSTERECTOMY      NECK SURGERY      1st level and extra rib rt side    pace maker  "        Medications:     Current Outpatient Medications:     azelastine 205.5 mcg (0.15 %) Spry, 205.5 mcg by Nasal route daily as needed., Disp: , Rfl:     carBAMazepine (TEGRETOL) 100 mg chewable tablet, Take 100 mg by mouth., Disp: , Rfl:     chlorzoxazone (PARAFON FORTE) 500 mg Tab, TAKE ONE TABLET BY MOUTH THREEE TIMES A DAY AS NEEDED FOR PAIN, Disp: , Rfl:     cholecalciferol, vitamin D3, 100 mcg (4,000 unit) Cap capsule, Take 1 capsule by mouth once daily., Disp: , Rfl:     clobetasol 0.05% (TEMOVATE) 0.05 % Oint, Apply topically 2 (two) times daily., Disp: , Rfl:     cycloSPORINE (RESTASIS MULTIDOSE) 0.05 % Drop, Apply 1 drop to eye., Disp: , Rfl:     flecainide (TAMBOCOR) 50 MG Tab, Take 50 mg by mouth every 12 (twelve) hours., Disp: , Rfl:     hydrocortisone 2.5 % cream, Place rectally., Disp: , Rfl:     ketotifen (ZADITOR) 0.025 % (0.035 %) ophthalmic solution, 1 drop 2 (two) times daily., Disp: , Rfl:     Lactobacillus rhamnosus GG (CULTURELLE) 10 billion cell capsule, Take 1 capsule by mouth once daily., Disp: , Rfl:     lactulose (CHRONULAC) 10 gram/15 mL solution, SMARTSI Milliliter(s) By Mouth Twice Daily PRN, Disp: , Rfl:     levocetirizine (XYZAL) 5 MG tablet, Take 5 mg by mouth once daily., Disp: , Rfl:     loratadine (CLARITIN) 10 mg tablet, Take 10 mg by mouth once daily., Disp: , Rfl:     magnesium oxide (MAG-OX) 400 mg (241.3 mg magnesium) tablet, Take 500 mg by mouth 2 (two) times daily., Disp: , Rfl:     mometasone 0.1% (ELOCON) 0.1 % cream, Apply topically once daily., Disp: , Rfl:     montelukast (SINGULAIR) 10 mg tablet, Take 10 mg by mouth every evening., Disp: , Rfl:     mupirocin (BACTROBAN) 2 % ointment, Apply topically 3 (three) times daily., Disp: , Rfl:     neomycin-polymyxin-dexamethasone (DEXACINE) 3.5 mg/g-10,000 unit/g-0.1 % Oint, Apply to eye., Disp: , Rfl:     nystatin (MYCOSTATIN) cream, Apply topically 2 (two) times daily., Disp: , Rfl:     oxyCODONE-acetaminophen  (ROXICET) 5-325 mg/5 mL Soln, Take by mouth., Disp: , Rfl:     pantoprazole (PROTONIX) 40 MG tablet, Take 40 mg by mouth once daily., Disp: , Rfl:     potassium gluconate 2.5 mEq Tab, Take by mouth 3 (three) times daily., Disp: , Rfl:     pregabalin (LYRICA) 75 MG capsule, Take 75 mg by mouth 2 (two) times daily., Disp: , Rfl:     triamcinolone acetonide 0.1% (KENALOG) 0.1 % ointment, Apply topically 2 (two) times daily., Disp: , Rfl:     zinc sulfate 66 mg Tab, Take 1 tablet by mouth once daily., Disp: , Rfl:     calcium carbonate (TUMS) 200 mg calcium (500 mg) chewable tablet, Take 1 tablet by mouth once daily., Disp: , Rfl:     FM Hx:   Family History   Problem Relation Age of Onset    Arthritis Mother     No Known Problems Father     No Known Problems Sister     No Known Problems Brother     No Known Problems Maternal Aunt     No Known Problems Maternal Uncle     No Known Problems Paternal Aunt     No Known Problems Paternal Uncle     No Known Problems Maternal Grandmother     No Known Problems Maternal Grandfather     No Known Problems Paternal Grandmother     No Known Problems Paternal Grandfather     Aneurysm Neg Hx     Cancer Neg Hx     Clotting disorder Neg Hx     Dementia Neg Hx     Diabetes Neg Hx     Fainting Neg Hx     Heart disease Neg Hx     Hyperlipidemia Neg Hx     Kidney disease Neg Hx     Liver disease Neg Hx     Migraines Neg Hx     Neuropathy Neg Hx     Obesity Neg Hx     Parkinsonism Neg Hx     Seizures Neg Hx     Stroke Neg Hx     Tremor Neg Hx      Social Hx: Denies tobacco, alcohol and illicit drug usage.  Social History     Socioeconomic History    Marital status: Single   Tobacco Use    Smoking status: Never    Smokeless tobacco: Never   Substance and Sexual Activity    Alcohol use: Never    Drug use: Never    Sexual activity: Not Currently       Review of Systems  Denies fevers, chills, headache, blurry vision, nausea, vomiting, dizziness, or syncope.   Denies chest pain, shortness of  "breath, RUQ pain, or calf pain.    Objective:     Vitals:    12/07/22 1023   BP: 137/71   BP Location: Right arm   Patient Position: Sitting   BP Method: Small (Automatic)   Pulse: 84   Resp: 18   Temp: 98.3 °F (36.8 °C)   TempSrc: Oral   SpO2: 98%   Weight: 51.7 kg (114 lb)   Height: 5' 5" (1.651 m)     Body mass index is 18.97 kg/m².    Physical Exam:     General: alert and oriented, in no acute distress  Lungs: clear to auscultation bilaterally, no conversational dyspnea  Heart: regular rate and rhythm  Abdomen: Soft, non-distended, non tender to palpation, no involuntary guarding, no rebound tenderness, +Carnett sign  Extremities: Normal, atraumatic, non-edematous, No cords or calf tenderness, No significant calf/ankle edema  External genitalia: Normal female genitalia without lesion, discharge or tenderness. Normal appearing urethral meatus. Normal appearing external anus.  Bimanual Exam: No pelvic lymphadenopathy noted bilaterally. Cuff intact - no evidence of dehiscence. Normal urethra. Normal bladder  Speculum Exam: Vaginal mucosa normal in appearance. Atrophic, pale. Cuff intact - no evidence of dehiscence.   Pelvic Pain: bilateral levator ani, piriformis, obturator internus ttp, urethra nntp, bladder nntp, adnexa nntp    Note: RN chaperone present for entirety of exam.     Assessment/Plan:    Araceli Cosby is a 62 y.o. G0 with chronic pelvic pain - multifactorial in origin and interstitial cystitis.    Chronic Pelvic Pain  Multifactorial etiology - documented h/o spine, hip issues  Physical exam notable for pelvic floor myalgia and MSK-related pain  Referred to pelvic floor physical therapy  Interstitial Cystitis  Sx currently well controlled via diet  Pt not interested in medical treatment at this time  HCM  Followed by Sommer Theodore in [place], [place] for management  Doesn't need a Pap as pt is s/p hyst in 1980s for benign reasons    3 month TeleHealth f/u. Pt has a provider in [place], [place] for " JAIE.    Future Appointments   Date Time Provider Department Center   12/15/2022  1:00 PM Naseem Yepez MD [facility] [address]   12/27/2022 10:30 AM PROVIDERS, [facility] [place]   1/23/2023  1:15 PM NEUROSURGERY RESIDENT [facility] [address] [address]   2/15/2023  1:45 PM Libby Lance NP [facility] ENDOCR [facility]   3/8/2023  2:45 PM RESIDENTS, [facility] GYN [facility] GYN [facility]     Patient and plan were discussed with Dr. Mora.    Pedro Rogel MD, MPH  [facility] -  [id number]  Pager: [phone]  11:04 AM 12/07/2022

## 2022-12-27 ENCOUNTER — OFFICE VISIT (OUTPATIENT)
Dept: OPHTHALMOLOGY | Facility: CLINIC | Age: 62
End: 2022-12-27
Payer: MEDICAID

## 2022-12-27 VITALS — HEIGHT: 65 IN | WEIGHT: 112 LBS | BODY MASS INDEX: 18.66 KG/M2

## 2022-12-27 DIAGNOSIS — H10.13 ALLERGIC CONJUNCTIVITIS OF BOTH EYES: ICD-10-CM

## 2022-12-27 DIAGNOSIS — H04.129 DRY EYE: ICD-10-CM

## 2022-12-27 DIAGNOSIS — H01.00B BLEPHARITIS OF UPPER AND LOWER EYELIDS OF BOTH EYES, UNSPECIFIED TYPE: ICD-10-CM

## 2022-12-27 DIAGNOSIS — H01.00A BLEPHARITIS OF UPPER AND LOWER EYELIDS OF BOTH EYES, UNSPECIFIED TYPE: ICD-10-CM

## 2022-12-27 DIAGNOSIS — H02.889 MEIBOMIAN GLAND DISEASE, UNSPECIFIED LATERALITY: Primary | ICD-10-CM

## 2022-12-27 PROCEDURE — 99213 OFFICE O/P EST LOW 20 MIN: CPT | Mod: PBBFAC,PO | Performed by: STUDENT IN AN ORGANIZED HEALTH CARE EDUCATION/TRAINING PROGRAM

## 2022-12-27 PROCEDURE — 68761 CLOSE TEAR DUCT OPENING: CPT | Mod: PBBFAC,PO,LT | Performed by: STUDENT IN AN ORGANIZED HEALTH CARE EDUCATION/TRAINING PROGRAM

## 2022-12-27 NOTE — LETTER
December 27, 2022    Araceli Cosby  [address]  [place] [address]             [facility]  [address] [place] [address]  Phone: [phone] To whom it may concern,     Ms. Araceli Cosby needs to us Ketotifen Fenfrumate eye drops for itchy eyes and preservative free Refresh eye drops to treat dry eyes.    If you have any questions or concerns, please don't hesitate to call.    Sincerely,        Jak Jung MD

## 2022-12-27 NOTE — PROGRESS NOTES
HPI    RTC 6 mo K check left  Patient states left eye tear duct has come out,   both eyes feel pressure with right eye worse she says that she uses the   [employer] ([employer]) itchy eye drops, she would like an updated Mrx   Last edited by Bibiana Cobb MA on 12/27/2022 10:59 AM.      Assessment /Plan     For exam results, see Encounter Report.    There are no diagnoses linked to this encounter.    1. Meibomian gland dysfunction (MGD) of both eyes with mild blepharitis  2. Allergic conjunctivitis of both eyes  - Schirmer's without anesthesia 10mm // 6mm at previous visits again on 9/29/15: 9mm // 8mm  - Punctal plugs replaced 2018  - LS/WC - blepharitis sheet given prior visit  - Chronic history of seasonal allergies, c/o itching/burning of eyes  - Continue ATs 4-6X/day, [place] BID PRN for itching    3. History bilateral nongranulomatous uveitis  - Lab NDIAYE neg in past (neg RPR, ACE, HLA B27, CBC, EVELYN, CXR)  - Pt with Raynauds  - Saw rheumatology; per patient testing has been wnl and has yearly f/u with rheum  - Reports hx of multiple episodes of zoster infections. Could be underlying cause. Consider lab testing if uveitis reoccurs  - Quiet off of PF since July 2015. Continue to monitor off of gtts - quiet today    4. Flick xt OS  - intermittent diplopia; notes usually vertical; sometimes horizontal   - ortho primary  - ctm    5. Episodic anisocoria  - seen prior to dilation 6/30/22; minimal asymmetry <1mm; equal dark and light  - no RAPD  - EOM full  - Longstanding  - appears physiologic    RTC 6 mo K check, dfe    Procedure Note: punctum occlusion by plug OS  Attending:  Fariha Odell MD  Resident:  Jak Jung MD  Anesthesia:  topical proparacaine   Specimens:  None  Complications:  None    Procedure details:  Informed consent obtained after extensive risks/benefits/alternatives were discussed with the patient including but not limited to pain, bleeding, infection, ocular injury, loss of the eye, need for revision, need for  further treatment. All questions were answered.  Patient wished to proceed.  Consent was obtained.    Topical proparacaine was instilled left eye.  0.8mm punctal plug with punctal dilator on applicator was used. The dilator portion was placed to dilate lower left punctum. 0.8mm punctal plug was then dispensed into punctum. Good fit ensured. The patient tolerated the procedure without complications.

## 2023-02-15 ENCOUNTER — OFFICE VISIT (OUTPATIENT)
Dept: ENDOCRINOLOGY | Facility: CLINIC | Age: 63
End: 2023-02-15
Payer: MEDICAID

## 2023-02-15 VITALS
DIASTOLIC BLOOD PRESSURE: 74 MMHG | BODY MASS INDEX: 19.49 KG/M2 | TEMPERATURE: 98 F | SYSTOLIC BLOOD PRESSURE: 130 MMHG | WEIGHT: 121.25 LBS | RESPIRATION RATE: 16 BRPM | HEART RATE: 67 BPM | HEIGHT: 66 IN

## 2023-02-15 DIAGNOSIS — E21.3 HYPERPARATHYROIDISM: ICD-10-CM

## 2023-02-15 DIAGNOSIS — M81.0 OSTEOPOROSIS, UNSPECIFIED OSTEOPOROSIS TYPE, UNSPECIFIED PATHOLOGICAL FRACTURE PRESENCE: ICD-10-CM

## 2023-02-15 DIAGNOSIS — E04.2 MULTINODULAR GOITER: Primary | ICD-10-CM

## 2023-02-15 DIAGNOSIS — R73.01 IMPAIRED FASTING GLUCOSE: ICD-10-CM

## 2023-02-15 LAB
ALBUMIN SERPL-MCNC: 4.2 G/DL (ref 3.4–4.8)
BUN SERPL-MCNC: 6.2 MG/DL (ref 9.8–20.1)
CALCIUM SERPL-MCNC: 10.5 MG/DL (ref 8.4–10.2)
CHLORIDE SERPL-SCNC: 104 MMOL/L (ref 98–107)
CO2 SERPL-SCNC: 26 MMOL/L (ref 23–31)
CREAT SERPL-MCNC: 0.63 MG/DL (ref 0.55–1.02)
DEPRECATED CALCIDIOL+CALCIFEROL SERPL-MC: 68.9 NG/ML (ref 30–80)
EST. AVERAGE GLUCOSE BLD GHB EST-MCNC: 105.4 MG/DL
GFR SERPLBLD CREATININE-BSD FMLA CKD-EPI: >60 MLS/MIN/1.73/M2
GLUCOSE SERPL-MCNC: 75 MG/DL (ref 82–115)
HBA1C MFR BLD: 5.3 %
PHOSPHATE SERPL-MCNC: 3.3 MG/DL (ref 2.3–4.7)
POTASSIUM SERPL-SCNC: 4.9 MMOL/L (ref 3.5–5.1)
PTH-INTACT SERPL-MCNC: 110.3 PG/ML (ref 8.7–77)
SODIUM SERPL-SCNC: 138 MMOL/L (ref 136–145)

## 2023-02-15 PROCEDURE — 99214 OFFICE O/P EST MOD 30 MIN: CPT | Mod: S$PBB,,, | Performed by: NURSE PRACTITIONER

## 2023-02-15 PROCEDURE — 1159F MED LIST DOCD IN RCRD: CPT | Mod: CPTII,,, | Performed by: NURSE PRACTITIONER

## 2023-02-15 PROCEDURE — 3075F SYST BP GE 130 - 139MM HG: CPT | Mod: CPTII,,, | Performed by: NURSE PRACTITIONER

## 2023-02-15 PROCEDURE — 3075F PR MOST RECENT SYSTOLIC BLOOD PRESS GE 130-139MM HG: ICD-10-PCS | Mod: CPTII,,, | Performed by: NURSE PRACTITIONER

## 2023-02-15 PROCEDURE — 1159F PR MEDICATION LIST DOCUMENTED IN MEDICAL RECORD: ICD-10-PCS | Mod: CPTII,,, | Performed by: NURSE PRACTITIONER

## 2023-02-15 PROCEDURE — 36415 COLL VENOUS BLD VENIPUNCTURE: CPT | Performed by: NURSE PRACTITIONER

## 2023-02-15 PROCEDURE — 80069 RENAL FUNCTION PANEL: CPT | Performed by: NURSE PRACTITIONER

## 2023-02-15 PROCEDURE — 82306 VITAMIN D 25 HYDROXY: CPT | Performed by: NURSE PRACTITIONER

## 2023-02-15 PROCEDURE — 3078F DIAST BP <80 MM HG: CPT | Mod: CPTII,,, | Performed by: NURSE PRACTITIONER

## 2023-02-15 PROCEDURE — 3078F PR MOST RECENT DIASTOLIC BLOOD PRESSURE < 80 MM HG: ICD-10-PCS | Mod: CPTII,,, | Performed by: NURSE PRACTITIONER

## 2023-02-15 PROCEDURE — 99213 OFFICE O/P EST LOW 20 MIN: CPT | Mod: PBBFAC | Performed by: NURSE PRACTITIONER

## 2023-02-15 PROCEDURE — 1160F PR REVIEW ALL MEDS BY PRESCRIBER/CLIN PHARMACIST DOCUMENTED: ICD-10-PCS | Mod: CPTII,,, | Performed by: NURSE PRACTITIONER

## 2023-02-15 PROCEDURE — 3008F BODY MASS INDEX DOCD: CPT | Mod: CPTII,,, | Performed by: NURSE PRACTITIONER

## 2023-02-15 PROCEDURE — 1160F RVW MEDS BY RX/DR IN RCRD: CPT | Mod: CPTII,,, | Performed by: NURSE PRACTITIONER

## 2023-02-15 PROCEDURE — 83036 HEMOGLOBIN GLYCOSYLATED A1C: CPT | Performed by: NURSE PRACTITIONER

## 2023-02-15 PROCEDURE — 83970 ASSAY OF PARATHORMONE: CPT | Performed by: NURSE PRACTITIONER

## 2023-02-15 PROCEDURE — 3008F PR BODY MASS INDEX (BMI) DOCUMENTED: ICD-10-PCS | Mod: CPTII,,, | Performed by: NURSE PRACTITIONER

## 2023-02-15 PROCEDURE — 99214 PR OFFICE/OUTPT VISIT, EST, LEVL IV, 30-39 MIN: ICD-10-PCS | Mod: S$PBB,,, | Performed by: NURSE PRACTITIONER

## 2023-02-15 RX ORDER — TRAMADOL HYDROCHLORIDE 50 MG/1
50 TABLET ORAL
COMMUNITY
Start: 2022-12-09 | End: 2023-02-15

## 2023-02-15 RX ORDER — ALBUTEROL SULFATE 90 UG/1
1 AEROSOL, METERED RESPIRATORY (INHALATION) EVERY 4 HOURS
COMMUNITY
Start: 2023-01-18

## 2023-02-15 RX ORDER — LEVOFLOXACIN 500 MG/1
500 TABLET, FILM COATED ORAL
COMMUNITY
Start: 2023-01-18 | End: 2023-02-15

## 2023-02-15 RX ORDER — DICYCLOMINE HYDROCHLORIDE 10 MG/1
10 CAPSULE ORAL EVERY 6 HOURS PRN
COMMUNITY
Start: 2023-02-13

## 2023-02-15 NOTE — PROGRESS NOTES
Subjective:       Patient ID: Araceli Cosby is a 63 y.o. female.    Chief Complaint: multinodular goiter    Endocrine clinic note 02/15/2023: 63 year old female her today for Endocrine clinic F/U for nontoxic multinodular goiter, and osteoporosis.  Patient had ultrasound on 09/16/2022 There is a cystic lesion seen in the right thyroid in the upper pole that measures 3 mm x 3 mm.  There is a mix attenuation nodule in the midpole that measures 4 mm x 4 mm.  There is another cystic area seen in the lower pole that measures 4 mm by 4 mm.  There are multiple areas of nodularity seen in the left thyroid.  Largest nodule measures 6 mm x 5 mm and is seen in the midpole.  Nodules do not meet size for criteria repeat 2 year.  Follow-up ultrasound  no nodules are palpated on exam today.  Previous thyroid functions within normal limits TSH 0.5092, free T4 1.35, free T3 2.98.  Osteoporosis patient had previous T11 fracture.  Note to chart indicates the patient has had multiple lumbar and pelvic fractures.  Discussed patient starting Prolia patient is currently undergoing dental work will complete her dental work and will research on Prolia to make an informed decision on starting.         Result Notes  Details        Reading Physician      Reading Date  Result Priority  Aaron Cardenas MD  [phone]  9/21/2022        Routine     Narrative & Impression  EXAMINATION:  US THYROID     CLINICAL HISTORY:  Nontoxic single thyroid nodule     TECHNIQUE:  Ultrasound of the thyroid and cervical lymph nodes was performed.     COMPARISON:  None.     FINDINGS:  The right thyroid measures 5.1 x 1.4 x 1.6 cm and left thyroid measures 4.5 x 1 x 1.2 cm.  There are multiple nodules seen in both thyroid lobes.  Isthmus measures 1.2 mm.  The echotexture of the thyroid is diffusely heterogeneous bilaterally.  There is a cystic lesion seen in the right thyroid in the upper pole that measures 3 mm x 3 mm.  There is a mix attenuation  nodule in the midpole that measures 4 mm x 4 mm.  There is another cystic area seen in the lower pole that measures 4 mm by 4 mm.  There are multiple areas of nodularity seen in the left thyroid.  Largest nodule measures 6 mm x 5 mm and is seen in the midpole.  To both lobes appears normal.     Impression:     Multinodular goiter bilaterally        Electronically signed by: Aaron Cardenas  Date:                                            09/21/2022  Time:                                           15:02          Review of Systems   Constitutional:  Negative for activity change, appetite change and fatigue.   HENT:  Positive for dental problem. Negative for hearing loss, tinnitus, trouble swallowing and goiter.         Currently having dental work   Eyes:  Negative for photophobia, pain and visual disturbance.   Respiratory:  Negative for cough, chest tightness and wheezing.    Cardiovascular:  Positive for leg swelling. Negative for chest pain and palpitations.   Gastrointestinal:  Negative for abdominal pain, constipation, diarrhea, nausea and reflux.   Endocrine: Negative for cold intolerance, heat intolerance, polydipsia and polyphagia.   Genitourinary:  Negative for difficulty urinating, flank pain, hematuria, hot flashes, menstrual irregularity, menstrual problem, nocturia and urgency.   Musculoskeletal:  Positive for back pain. Negative for gait problem, joint swelling, leg pain and joint deformity.   Integumentary:  Negative for color change, pallor, rash and breast discharge.   Allergic/Immunologic: Negative for environmental allergies, food allergies and immunocompromised state.   Neurological:  Negative for tremors, seizures, headaches, coordination difficulties, memory loss and coordination difficulties.   Psychiatric/Behavioral:  Negative for agitation, behavioral problems and sleep disturbance. The patient is not nervous/anxious.        Objective:      Physical Exam  Constitutional:       General:  She is not in acute distress.     Appearance: Normal appearance. She is not ill-appearing.   HENT:      Head: Normocephalic and atraumatic.      Right Ear: External ear normal.      Left Ear: External ear normal.      Nose: Nose normal. No congestion or rhinorrhea.      Mouth/Throat:      Mouth: Mucous membranes are moist.      Pharynx: Oropharynx is clear. No oropharyngeal exudate.   Eyes:      General:         Right eye: No discharge.         Left eye: No discharge.      Conjunctiva/sclera: Conjunctivae normal.      Pupils: Pupils are equal, round, and reactive to light.   Neck:      Thyroid: No thyroid mass, thyromegaly or thyroid tenderness.   Cardiovascular:      Rate and Rhythm: Normal rate and regular rhythm.      Pulses: Normal pulses.      Heart sounds: Normal heart sounds. No murmur heard.  Pulmonary:      Effort: Pulmonary effort is normal. No respiratory distress.      Breath sounds: Normal breath sounds.   Abdominal:      General: Abdomen is flat. Bowel sounds are normal. There is no distension.      Palpations: Abdomen is soft.      Tenderness: There is no abdominal tenderness.   Musculoskeletal:         General: No tenderness. Normal range of motion.      Cervical back: Normal range of motion and neck supple. No tenderness.      Right lower leg: Edema present.      Left lower leg: Edema present.   Feet:      Right foot:      Skin integrity: Skin integrity normal.      Left foot:      Skin integrity: Skin integrity normal.   Lymphadenopathy:      Cervical: No cervical adenopathy.   Skin:     General: Skin is warm and dry.      Coloration: Skin is not jaundiced or pale.   Neurological:      General: No focal deficit present.      Mental Status: She is alert and oriented to person, place, and time. Mental status is at baseline.      Coordination: Coordination normal.      Gait: Gait normal.   Psychiatric:         Mood and Affect: Mood normal.         Behavior: Behavior normal.         Thought Content:  Thought content normal.       Assessment:       Problem List Items Addressed This Visit    None  Visit Diagnoses       Multinodular goiter    -  Primary    Osteoporosis, unspecified osteoporosis type, unspecified pathological fracture presence        Hyperparathyroidism        Relevant Orders    Vitamin D    Renal Function Panel    PTH, Intact            Plan:       Multinodular goiter  Ultrasound 09/21/2022  TSH 0.5092, free T4 1.35, free T3 2.98  Largest nodule 6 mm x 5 mm seen in midpole  Multi nodules none meeting criteria for FNA  Repeat follow-up ultrasound 2 years  Component Ref Range & Units 2 mo ago 4 mo ago 5 yr ago   Thyroid Stimulating Hormone 0.3500 - 4.9400 uIU/mL 0.5092  0.9350  0.529 R      Component Ref Range & Units 2 mo ago 4 mo ago   Thyroxine Free 0.70 - 1.48 ng/dL 1.35  0.94      Component Ref Range & Units 2 mo ago    T3 Free 1.57 - 3.91 pg/mL 2.98      Osteoporosis, unspecified osteoporosis type, unspecified pathological fracture presence  Patient is currently receiving dental treatment  We will research Prolia and consider    Hyperparathyroidism  Vitamin-D level 90.2 High, .3, Calcium level 10.1 on 11/22/2022  Patient's vitamin-D decreased to 1000 IU  Repeat labs today renal panel, PTH and vitamin-D  If PTH remains elevated order NM parathyroid  -     Vitamin D; Future; Expected date: 02/15/2023  -     Renal Function Panel; Future; Expected date: 02/15/2023  -     PTH, Intact; Future; Expected date: 02/15/2023  Component Ref Range & Units 2 mo ago    Vit D 25 OH 30.0 - 80.0 ng/mL 90.2 High       Component Ref Range & Units 2 mo ago    Parathyroid Hormone Intact 8.7 - 77.0 pg/mL 112.3 High       Component Ref Range & Units 2 mo ago 5 yr ago   Sodium Level 136 - 145 mmol/L 140  139    Potassium Level 3.5 - 5.1 mmol/L 3.4 Low   3.5    Chloride 98 - 107 mmol/L 102  102    Carbon Dioxide 23 - 31 mmol/L 26  27.0 R    Glucose Level 82 - 115 mg/dL 136 High   78 R    Blood Urea Nitrogen 9.8 -  20.1 mg/dL 7.7 Low   7.0 R    Creatinine 0.55 - 1.02 mg/dL 0.66  0.71    Calcium Level Total 8.4 - 10.2 mg/dL 10.1  9.1 R    Albumin Level 3.4 - 4.8 gm/dL 4.5  4.20 R    Phosphorus Level 2.3 - 4.7 mg/dL 2.5     eGFR mls/min/1.73/m2 >60          Impaired fasting glucose  A1c today  -     Hemoglobin A1C; Future; Expected date: 02/15/2023      I spent a total of 30 minutes on the day of the visit.  This includes face to face time and non-face to face time preparing to see the patient (eg, review of tests), obtaining and/or reviewing separately obtained history, documenting clinical information in the electronic or other health record, independently interpreting results and communicating results to the patient/family/caregiver, or care coordinator.

## 2023-02-16 DIAGNOSIS — E21.3 HYPERPARATHYROIDISM: Primary | ICD-10-CM

## 2023-02-23 ENCOUNTER — HOSPITAL ENCOUNTER (OUTPATIENT)
Dept: RADIOLOGY | Facility: HOSPITAL | Age: 63
Discharge: HOME | End: 2023-02-23
Attending: NURSE PRACTITIONER
Payer: MEDICAID

## 2023-02-23 DIAGNOSIS — E21.3 HYPERPARATHYROIDISM: ICD-10-CM

## 2023-02-23 PROCEDURE — 78070 PARATHYROID PLANAR IMAGING: CPT | Mod: TC

## 2023-02-24 DIAGNOSIS — E21.3 HYPERPARATHYROIDISM: Primary | ICD-10-CM

## 2023-02-24 DIAGNOSIS — M81.0 OSTEOPOROSIS, UNSPECIFIED OSTEOPOROSIS TYPE, UNSPECIFIED PATHOLOGICAL FRACTURE PRESENCE: ICD-10-CM

## 2023-03-03 ENCOUNTER — HOSPITAL ENCOUNTER (OUTPATIENT)
Dept: RADIOLOGY | Facility: HOSPITAL | Age: 63
Discharge: HOME | End: 2023-03-03
Attending: NURSE PRACTITIONER
Payer: MEDICAID

## 2023-03-03 DIAGNOSIS — E21.3 HYPERPARATHYROIDISM: ICD-10-CM

## 2023-03-03 DIAGNOSIS — M81.0 OSTEOPOROSIS, UNSPECIFIED OSTEOPOROSIS TYPE, UNSPECIFIED PATHOLOGICAL FRACTURE PRESENCE: ICD-10-CM

## 2023-03-03 PROCEDURE — 77081 DXA BONE DENSITY APPENDICULR: CPT | Mod: TC

## 2023-03-14 ENCOUNTER — APPOINTMENT (OUTPATIENT)
Dept: LAB | Facility: HOSPITAL | Age: 63
End: 2023-03-14
Attending: NURSE PRACTITIONER
Payer: MEDICAID

## 2023-03-14 LAB
CALCIUM 24H UR-MCNC: 160.6 MG/DAY (ref 100–300)
CALCIUM UR-MCNC: 7.3 MG/DL
CREAT 24H UR-MCNC: 715 MG/DAY (ref 950–2490)
CREAT UR-MCNC: 32.5 MG/DL (ref 47–110)
TOTAL VOLUME  (OHS): 2200 ML
TOTAL VOLUME  (OHS): 2200 ML

## 2023-03-22 ENCOUNTER — TELEPHONE (OUTPATIENT)
Dept: ENDOCRINOLOGY | Facility: CLINIC | Age: 63
End: 2023-03-22
Payer: MEDICAID

## 2023-03-22 DIAGNOSIS — E21.3 HYPERPARATHYROIDISM: Primary | ICD-10-CM

## 2023-03-23 NOTE — PROGRESS NOTES
BREANN Roberto NP  Advised patient that Libby reached out to radiology to find out if giving a steroid prior to CT is advisable and that Radiology has not returned call. Advised her that Libby consulted with Dr. Montoya and his recommendation is to refer to ENT for an exploratory surgery of parathyroid for possible parathyroid adenoma. Advised that patient that her bone density of her spine is -0.5 which is severe osteoporosis and is putting her at significant risk for compression fractures. Advised her that at this time Prolia every 6 months is recommended. Patient stated that her dentist recommended that she did not take Prolia until she was done with all of her dental work. She stated that she completed her dental work 2 weeks ago but she was told by her PCP, Dr. Sommer Cueto that Evenity was a better solution for her osteoporosis. Patient stated that she will see Dr. Cueto on 3/29/2023 and will discuss starting Evenity injections that Dr. Cueto will order. Copy of Bone density result faxed to Dr. Cueto's office. Copy of NM of parathyroid faxed to ENT Dr. Levon Lee's office. Advised patient that referral for ENT here at [facility] was put in for possible exploratory surgery.         Noted.  Osteoporosis care will be deferred to patient's primary care provider.  I had a conversation with her primary care provider yesterday and the primary care provider stated she was unaware that she be treated patient's osteoporosis.  She stated she will discuss this with the patient on her visit that is coming up in the next week.  Patient's PCP stated that she did not tell the patient she could not take Prolia she stated that she stated that Prolia was a good option.  Also Dr. Lee home can take over her ENT care.  Patient's PCP also stated that she had listed that the patient had a high allergy to iodine also on her chart along with 19 other allergies.     As stated above osteoporosis care  will be deferred to patient's PCP and also ENT care to Dr. Lee

## 2023-04-19 ENCOUNTER — CLINICAL SUPPORT (OUTPATIENT)
Dept: GYNECOLOGY | Facility: CLINIC | Age: 63
End: 2023-04-19
Payer: MEDICAID

## 2023-04-19 DIAGNOSIS — M81.0 AGE RELATED OSTEOPOROSIS, UNSPECIFIED PATHOLOGICAL FRACTURE PRESENCE: Primary | ICD-10-CM

## 2023-04-19 DIAGNOSIS — N30.10 INTERSTITIAL CYSTITIS: ICD-10-CM

## 2023-04-19 DIAGNOSIS — R10.2 CHRONIC PELVIC PAIN IN FEMALE: ICD-10-CM

## 2023-04-19 DIAGNOSIS — G89.29 CHRONIC PELVIC PAIN IN FEMALE: ICD-10-CM

## 2023-04-19 RX ORDER — CETIRIZINE HYDROCHLORIDE 5 MG/1
5 TABLET ORAL 2 TIMES DAILY
COMMUNITY
Start: 2023-03-29

## 2023-04-19 NOTE — ASSESSMENT & PLAN NOTE
· Well controlled with diet  · Incontinence is controlled with pelvic floor exercises, encouraged to continue

## 2023-04-19 NOTE — PROGRESS NOTES
"Established Patient - Audio Only Telehealth Visit     The patient location is: Home  The chief complaint leading to consultation is: Follow-up of pelvic pain s/p fracture and interstitial cystitis/incontinence  Visit type: Virtual visit with audio only (telephone)  Total time spent with patient: 10 minutes    The reason for the audio only service rather than synchronous audio and video virtual visit was related to technical difficulties or patient preference/necessity.     Each patient to whom I provide medical services by telemedicine is:  (1) informed of the relationship between the physician and patient and the respective role of any other health care provider with respect to management of the patient; and (2) notified that they may decline to receive medical services by telemedicine and may withdraw from such care at any time. Patient verbally consented to receive this service via voice-only telephone call.     HPI:   Araceli oCsby is a 62 y.o. G0 who presents to establish GYN care. Pt is s/p CHAY/BSO in 1980s 2/2 Pelvic Pain and "ovarian cysts". Denies any h/o GYN cancer. Reports being diagnosed with interstitial cystitis in 1999 and adheres to the IC diet. Also states she has a 30+ year of pelvic pain, mainly notes after prolonged activity. Not currently sexually active.      She is also followed by Orthopedics for UE issues. She was referred to a provider in [place] for her hip/spine pain at her last visit in 9/2022. Per chart review, pt also has a h/o recent pelvic fractures that pt reports she never underwent surgical management for. The most recent pelvic US (5/2022) was notable for healing pubic rami fractures.      Pt is followed by Libby Lance (Endocrinology NP) for osteoporosis and multinodular goiter. At the last visit in 11/2022, plan was to repeat DEXA Scan and then consider initiation of bisphonates. Pt is currently on Calcium and Vitamin D. Pt's PCP is Sommer Theodore in [place] who " follows for HCM.     Of note, pt follows with Neurosurgery for management of severe right levoscoliosis, T11 compression fracture, and lower back pain. At the last visit in 10/2022, no acute neurosurgical intervention was deemed necessary and the plan was to continue wearing back brace.     Was given referral for PT and she has been going twice per week for pelvic floor physical therapy. She has had improvement in her pain, been doing both internal and external pelvic work. This has also provided significant relief of her incontinence issues. Her symptoms of interstitial cystitis remain well controlled.    She remains with the lumbar back pain that makes it difficult for her ambulate far distances. Since her prior visit she has seen both the endocrinologist and PCP who have been discussing her osteoporosis management. At this time she has been referred for Evenity injections and appears that her PCP is working through the insurance aspect of this.     She needs a HCM appointment, states that she has had a mammogram in the recent past but she needs complete physical exam.     Assessment and plan:     Problem List Items Addressed This Visit          Renal/    Interstitial cystitis     Well controlled with diet  Incontinence is controlled with pelvic floor exercises, encouraged to continue            GI    Chronic pelvic pain in female     Improved with pelvic floor physical therapy, 2x/week  Continue therapy per recommendations by therapists  Likely multifactorial, with a component of osteoporosis            Orthopedic    Age related osteoporosis - Primary     This service was not originating from a related E/M service provided within the previous 7 days nor will  to an E/M service or procedure within the next 24 hours or my soonest available appointment.  Prevailing standard of care was able to be met in this audio-only visit.      Discussed with Dr. Mora.  RTC for annual appointment with KEVIN Florian,  MD PGY-4  Obstetrics and Gynecology

## 2023-04-19 NOTE — ASSESSMENT & PLAN NOTE
· Improved with pelvic floor physical therapy, 2x/week  · Continue therapy per recommendations by therapists  · Likely multifactorial, with a component of osteoporosis

## 2023-04-19 NOTE — PROGRESS NOTES
Established Patient - Audio Only Telehealth Visit     The patient location is:   The chief complaint leading to consultation is: ***  Visit type: Virtual visit with audio only (telephone)  Total time spent with patient: ***       The reason for the audio only service rather than synchronous audio and video virtual visit was related to technical difficulties or patient preference/necessity.     Each patient to whom I provide medical services by telemedicine is:  (1) informed of the relationship between the physician and patient and the respective role of any other health care provider with respect to management of the patient; and (2) notified that they may decline to receive medical services by telemedicine and may withdraw from such care at any time. Patient verbally consented to receive this service via voice-only telephone call.       HPI:        Assessment and plan:  ***                        This service was not originating from a related E/M service provided within the previous 7 days nor will  to an E/M service or procedure within the next 24 hours or my soonest available appointment.  Prevailing standard of care was able to be met in this audio-only visit.           Please call in tessalon perles 100 mg tid for cough #[phone].   I can also approve a different abx: [id number].   If not improving she would need to be seen in clinic or UC

## 2023-05-03 ENCOUNTER — TELEPHONE (OUTPATIENT)
Dept: ENDOCRINOLOGY | Facility: CLINIC | Age: 63
End: 2023-05-03
Payer: MEDICAID

## 2023-05-03 NOTE — TELEPHONE ENCOUNTER
----- Message from Venecia Alvarenga sent at 5/3/2023 11:22 AM CDT -----  Regarding: Please call  [facility]         Dr.Frederick Jung is asking to speak with Edgar regarding the pts treatment.   # [phone]   Anytime after 2 pm bc he is in [place]

## 2023-06-21 ENCOUNTER — OFFICE VISIT (OUTPATIENT)
Dept: OTOLARYNGOLOGY | Facility: CLINIC | Age: 63
End: 2023-06-21
Payer: MEDICAID

## 2023-06-21 VITALS
HEART RATE: 71 BPM | BODY MASS INDEX: 19.95 KG/M2 | TEMPERATURE: 98 F | WEIGHT: 123.63 LBS | SYSTOLIC BLOOD PRESSURE: 128 MMHG | DIASTOLIC BLOOD PRESSURE: 79 MMHG

## 2023-06-21 DIAGNOSIS — E21.3 HYPERPARATHYROIDISM: ICD-10-CM

## 2023-06-21 PROCEDURE — 99213 OFFICE O/P EST LOW 20 MIN: CPT | Mod: PBBFAC | Performed by: STUDENT IN AN ORGANIZED HEALTH CARE EDUCATION/TRAINING PROGRAM

## 2023-06-21 RX ORDER — PREDNISONE 10 MG/1
50 TABLET ORAL DAILY
Qty: 5 TABLET | Refills: 0 | Status: CANCELLED | OUTPATIENT
Start: 2023-06-21

## 2023-06-21 RX ORDER — DIPHENHYDRAMINE HCL 25 MG
25 CAPSULE ORAL ONCE
Qty: 1 CAPSULE | Refills: 0 | Status: CANCELLED | OUTPATIENT
Start: 2023-06-21 | End: 2023-06-21

## 2023-06-21 NOTE — PROGRESS NOTES
[facility]  Otolaryngology-Head & Neck Surgery    Office Visit    Araceli Cosby  [id number]  1960    CC: Primary hyperparathyroidism     HPI: Araceli Cosby is a 63 y.o. female referred here by endocrinology for concern for primary hyperparathyroidism. She has had osteoporosis ever since she was in her 30s and had a hysterectomy and oophorectomy when she was younger, too. Recently, she has had several fractures, including a non-operative pelvic stress fracture likely related to her osteoporosis seen on imaging in December 2022. She often feels like she is in a brain fog. She has frequent joint aches and pains, but this has been going on for a long period of time. She recently had a DEXA scan in March 2023 which showed osteoporosis. Recently, she had elevated calcium and PTH. She also had a NM scan which was non-localizing, but did not have 4D CT because of a listed contrast allergy. She says this is not that severe, and usually results in a rash. She has had contrast before with benadryl and/or prednisone and tolerated it well. She has no other head and neck issues today. She does follow with Dr. Levon Lee in [place], most notably for a right nasal lesion which was biopsied but was reportedly negative.     Review of patient's allergies indicates:   Allergen Reactions    Amlodipine Swelling    Cardizem [diltiazem hcl] Swelling    Iodinated contrast media Rash    Metoprolol Shortness Of Breath    Acyclovir analogues Other (See Comments)     Headache    Azithromycin Other (See Comments)     Hard on stomach    Cephalexin Other (See Comments)    Gabapentin      Other reaction(s): GI Upset, Headache    Hydromorphone     Ibuprofen-famotidine      Other reaction(s): GI Upset    Nsaids (non-steroidal anti-inflammatory drug)     Paroxetine Other (See Comments)     Other reaction(s): Cognitive Disruption    Serotonin 5ht-3 antagonists     Tramadol Other (See Comments)     nervousness     Wellbutrin [bupropion hcl] Other (See Comments)     headache    Ciprofloxacin Nausea And Vomiting     Other reaction(s): GI Upset, Headache    Codeine Rash    Cyclobenzaprine Rash    Doxycycline Rash    Iodine and iodide containing products Rash    Paxil [paroxetine hcl] Anxiety    Prednisone Palpitations     Via mouth    Sulfa (sulfonamide antibiotics) Rash       Past Medical History:   Diagnosis Date    Allergies     Cancer     skin cancer    Fibromyalgia     GERD (gastroesophageal reflux disease)     Interstitial cystitis     Mitral valve prolapse     Muscle disease     Osteoarthritis     Osteoporosis        Past Surgical History:   Procedure Laterality Date    APPENDECTOMY      CARPAL TUNNEL RELEASE Right     FINGER SURGERY N/A     left thumb    HYSTERECTOMY      NECK SURGERY      1st level and extra rib rt side    pace maker         Social History     Socioeconomic History    Marital status: Single   Tobacco Use    Smoking status: Never    Smokeless tobacco: Never   Substance and Sexual Activity    Alcohol use: Never    Drug use: Never    Sexual activity: Not Currently       Family History   Problem Relation Age of Onset    Arthritis Mother     No Known Problems Father     No Known Problems Sister     No Known Problems Brother     No Known Problems Maternal Aunt     No Known Problems Maternal Uncle     No Known Problems Paternal Aunt     No Known Problems Paternal Uncle     No Known Problems Maternal Grandmother     No Known Problems Maternal Grandfather     No Known Problems Paternal Grandmother     No Known Problems Paternal Grandfather     Aneurysm Neg Hx     Cancer Neg Hx     Clotting disorder Neg Hx     Dementia Neg Hx     Diabetes Neg Hx     Fainting Neg Hx     Heart disease Neg Hx     Hyperlipidemia Neg Hx     Kidney disease Neg Hx     Liver disease Neg Hx     Migraines Neg Hx     Neuropathy Neg Hx     Obesity Neg Hx     Parkinsonism Neg Hx     Seizures Neg Hx     Stroke Neg Hx     Tremor Neg Hx         Outpatient Encounter Medications as of 2023   Medication Sig Dispense Refill    albuterol (PROVENTIL/VENTOLIN HFA) 90 mcg/actuation inhaler Inhale 1 puff into the lungs every 4 (four) hours.      azelastine 205.5 mcg (0.15 %) Spry 205.5 mcg by Nasal route daily as needed.      carBAMazepine (TEGRETOL) 100 mg chewable tablet Take 100 mg by mouth.      carboxymethylcellulose (REFRESH PLUS) 0.5 % Dpet 1 drop 3 (three) times daily as needed.      cetirizine (ZYRTEC) 5 MG tablet Take 5 mg by mouth 2 (two) times daily.      chlorzoxazone (PARAFON FORTE) 500 mg Tab TAKE ONE TABLET BY MOUTH THREEE TIMES A DAY AS NEEDED FOR PAIN      cholecalciferol, vitamin D3, 100 mcg (4,000 unit) Cap capsule Take 1 capsule by mouth once daily.      clobetasol 0.05% (TEMOVATE) 0.05 % Oint Apply topically 2 (two) times daily.      cycloSPORINE (RESTASIS MULTIDOSE) 0.05 % Drop Apply 1 drop to eye.      dicyclomine (BENTYL) 10 MG capsule Take 10 mg by mouth every 6 (six) hours as needed.      flecainide (TAMBOCOR) 50 MG Tab Take 50 mg by mouth every 12 (twelve) hours.      hydrocortisone 2.5 % cream Place rectally.      ketotifen (ZADITOR) 0.025 % (0.035 %) ophthalmic solution 1 drop 2 (two) times daily.      Lactobacillus rhamnosus GG (CULTURELLE) 10 billion cell capsule Take 1 capsule by mouth once daily.      lactulose (CHRONULAC) 10 gram/15 mL solution SMARTSI Milliliter(s) By Mouth Twice Daily PRN      levocetirizine (XYZAL) 5 MG tablet Take 5 mg by mouth once daily.      loratadine (CLARITIN) 10 mg tablet Take 10 mg by mouth once daily.      magnesium oxide (MAG-OX) 400 mg (241.3 mg magnesium) tablet Take 500 mg by mouth 2 (two) times daily.      mometasone 0.1% (ELOCON) 0.1 % cream Apply topically once daily.      montelukast (SINGULAIR) 10 mg tablet Take 10 mg by mouth every evening.      mupirocin (BACTROBAN) 2 % ointment Apply topically 3 (three) times daily.      neomycin-polymyxin-dexamethasone (DEXACINE) 3.5  mg/g-10,000 unit/g-0.1 % Oint Apply to eye.      nystatin (MYCOSTATIN) cream Apply topically 2 (two) times daily.      oxyCODONE-acetaminophen (ROXICET) 5-325 mg/5 mL Soln Take by mouth.      pantoprazole (PROTONIX) 40 MG tablet Take 40 mg by mouth once daily.      potassium gluconate 2.5 mEq Tab Take by mouth 3 (three) times daily.      pregabalin (LYRICA) 75 MG capsule Take 75 mg by mouth 2 (two) times daily.      triamcinolone acetonide 0.1% (KENALOG) 0.1 % ointment Apply topically 2 (two) times daily.      zinc sulfate 66 mg Tab Take 1 tablet by mouth once daily.       No facility-administered encounter medications on file as of 6/21/2023.       PHYSICAL EXAM:  Vitals:    06/21/23 0916   BP: 128/79   Pulse: 71   Temp: 98.2 °F (36.8 °C)       General Appearance: well nourished, well-developed, alert, oriented, in no acute distress  Head/Face: NC, AT  Eyes: PEERLA, EOMI, normal conjunctiva  Ears: Hears well at normal conversation volume  AD: external normal, ear canal normal, TM intact, no middle ear fluid  AS: external normal, ear canal normal, TM intact, no middle ear fluid  Nose: septum midline, no inferior turbinate hypertrophy, no polyps. Small crusted lesion   OC/OP: dentition moderate, no oral lesions, FOM and BOT soft  Neck: soft, non-tender, no palpable lymph nodes, thyroid- no nodules or goiter  Neuro: CN II - XII intact  Psychiatric: oriented to time, place and person, no depression, anxiety or agitation      PERTINENT DATA:  PTH: 112 in Nov 22, 110 in Feb 22, and reportedly 50 in April with Dr. Lee  Calcium: 10.5 in February 2023 (10.3 corrected)  DEXA 3/3/23: Osteoporosis  NM Parathyroid 2/23/23: Non localizing    ASSESSMENT:  Araceli Cosby is a 63 y.o. female with persistent hypercalcemia and concern for primary hyperparathyroidism. She has slightly elevated calcium and has previoulsy had elevated PTH, although she reportedly had a normal PTH recently with Dr. Lee in April 2023. Her  previous nuclear medicine scan was nonlocalizing.     PLAN:  --I have briefly discussed this patient with Dr. Levno Lee. He indicates that her PTH had actually dropped to 50 the last time that he saw her in April. We will order new labs to see if her PTH is back into the normal range, as well as to see if her calcium has changed. Furthermore, patient would like to follow up with Dr. Lee for this issue, as he is closer to her and she has previously seen him for other ENT conditions   -- Prior to a four gland exploration, we would recommend 4D CT scan to help localize a parathyroid gland. She does have a contrast allergy, but this is only a rash and she says that has had scans before if she receives pretreatment with prednisone and/or benadryl  -- Will reach out to endocrinology to discuss the above  -- Patient to follow up with Dr. Lee.    Addendum 6/22/23 1200  -- Spoke with endocrinology - patient will follow up with Dr. Lee for this issue going forward. Please let us know if there is anything else we can do for this patient in the future.     Luis Harris MD  [facility] Otolaryngology PGY-3  9:25 AM 06/21/2023

## 2023-06-26 ENCOUNTER — TELEPHONE (OUTPATIENT)
Dept: OPHTHALMOLOGY | Facility: CLINIC | Age: 63
End: 2023-06-26
Payer: MEDICAID

## 2023-06-26 NOTE — TELEPHONE ENCOUNTER
"----- Message from Radha Hernandez sent at 6/26/2023  1:45 PM CDT -----  Regarding: coming in sooner  Pt was rescheduled from this month to Sept for a DFE. She is stating that she is have floaters and "sparkles".  She is wanting to know if she should/can come in sooner. No Vision loss or pain per pt.    Thanks Radha    "

## 2023-07-05 NOTE — PROGRESS NOTES
I reviewed the history and physical exam with the resident.   I agree with findings and plan.    Marlon Mane M.D.

## 2023-08-04 ENCOUNTER — LAB VISIT (OUTPATIENT)
Dept: LAB | Facility: HOSPITAL | Age: 63
End: 2023-08-04
Attending: NURSE PRACTITIONER
Payer: MEDICAID

## 2023-08-04 ENCOUNTER — OFFICE VISIT (OUTPATIENT)
Dept: ENDOCRINOLOGY | Facility: CLINIC | Age: 63
End: 2023-08-04
Payer: MEDICAID

## 2023-08-04 VITALS
TEMPERATURE: 98 F | BODY MASS INDEX: 18.96 KG/M2 | HEART RATE: 73 BPM | HEIGHT: 66 IN | SYSTOLIC BLOOD PRESSURE: 127 MMHG | DIASTOLIC BLOOD PRESSURE: 78 MMHG | RESPIRATION RATE: 17 BRPM | WEIGHT: 118 LBS

## 2023-08-04 DIAGNOSIS — M81.0 OSTEOPOROSIS, UNSPECIFIED OSTEOPOROSIS TYPE, UNSPECIFIED PATHOLOGICAL FRACTURE PRESENCE: ICD-10-CM

## 2023-08-04 DIAGNOSIS — E21.3 HYPERPARATHYROIDISM: ICD-10-CM

## 2023-08-04 DIAGNOSIS — E04.2 MULTINODULAR GOITER: ICD-10-CM

## 2023-08-04 DIAGNOSIS — E04.2 MULTINODULAR GOITER: Primary | ICD-10-CM

## 2023-08-04 LAB
ALBUMIN SERPL-MCNC: 4.4 G/DL (ref 3.4–4.8)
BUN SERPL-MCNC: 5.2 MG/DL (ref 9.8–20.1)
CALCIUM SERPL-MCNC: 9.8 MG/DL (ref 8.4–10.2)
CHLORIDE SERPL-SCNC: 101 MMOL/L (ref 98–107)
CO2 SERPL-SCNC: 28 MMOL/L (ref 23–31)
CREAT SERPL-MCNC: 0.7 MG/DL (ref 0.55–1.02)
DEPRECATED CALCIDIOL+CALCIFEROL SERPL-MC: 53.3 NG/ML (ref 30–80)
GFR SERPLBLD CREATININE-BSD FMLA CKD-EPI: >60 MLS/MIN/1.73/M2
GLUCOSE SERPL-MCNC: 93 MG/DL (ref 82–115)
PHOSPHATE SERPL-MCNC: 2.7 MG/DL (ref 2.3–4.7)
POTASSIUM SERPL-SCNC: 4.2 MMOL/L (ref 3.5–5.1)
PTH-INTACT SERPL-MCNC: 154.1 PG/ML (ref 8.7–77)
SODIUM SERPL-SCNC: 137 MMOL/L (ref 136–145)
T4 FREE SERPL-MCNC: 1.16 NG/DL (ref 0.7–1.48)
TSH SERPL-ACNC: 0.59 UIU/ML (ref 0.35–4.94)

## 2023-08-04 PROCEDURE — 1160F PR REVIEW ALL MEDS BY PRESCRIBER/CLIN PHARMACIST DOCUMENTED: ICD-10-PCS | Mod: CPTII,,, | Performed by: NURSE PRACTITIONER

## 2023-08-04 PROCEDURE — 84439 ASSAY OF FREE THYROXINE: CPT

## 2023-08-04 PROCEDURE — 1159F MED LIST DOCD IN RCRD: CPT | Mod: CPTII,,, | Performed by: NURSE PRACTITIONER

## 2023-08-04 PROCEDURE — 1159F PR MEDICATION LIST DOCUMENTED IN MEDICAL RECORD: ICD-10-PCS | Mod: CPTII,,, | Performed by: NURSE PRACTITIONER

## 2023-08-04 PROCEDURE — 84443 ASSAY THYROID STIM HORMONE: CPT

## 2023-08-04 PROCEDURE — 83970 ASSAY OF PARATHORMONE: CPT

## 2023-08-04 PROCEDURE — 3074F PR MOST RECENT SYSTOLIC BLOOD PRESSURE < 130 MM HG: ICD-10-PCS | Mod: CPTII,,, | Performed by: NURSE PRACTITIONER

## 2023-08-04 PROCEDURE — 3008F PR BODY MASS INDEX (BMI) DOCUMENTED: ICD-10-PCS | Mod: CPTII,,, | Performed by: NURSE PRACTITIONER

## 2023-08-04 PROCEDURE — 99214 OFFICE O/P EST MOD 30 MIN: CPT | Mod: S$PBB,,, | Performed by: NURSE PRACTITIONER

## 2023-08-04 PROCEDURE — 1160F RVW MEDS BY RX/DR IN RCRD: CPT | Mod: CPTII,,, | Performed by: NURSE PRACTITIONER

## 2023-08-04 PROCEDURE — 82306 VITAMIN D 25 HYDROXY: CPT

## 2023-08-04 PROCEDURE — 3078F DIAST BP <80 MM HG: CPT | Mod: CPTII,,, | Performed by: NURSE PRACTITIONER

## 2023-08-04 PROCEDURE — 36415 COLL VENOUS BLD VENIPUNCTURE: CPT

## 2023-08-04 PROCEDURE — 3078F PR MOST RECENT DIASTOLIC BLOOD PRESSURE < 80 MM HG: ICD-10-PCS | Mod: CPTII,,, | Performed by: NURSE PRACTITIONER

## 2023-08-04 PROCEDURE — 3044F PR MOST RECENT HEMOGLOBIN A1C LEVEL <7.0%: ICD-10-PCS | Mod: CPTII,,, | Performed by: NURSE PRACTITIONER

## 2023-08-04 PROCEDURE — 3008F BODY MASS INDEX DOCD: CPT | Mod: CPTII,,, | Performed by: NURSE PRACTITIONER

## 2023-08-04 PROCEDURE — 99213 OFFICE O/P EST LOW 20 MIN: CPT | Mod: PBBFAC | Performed by: NURSE PRACTITIONER

## 2023-08-04 PROCEDURE — 80069 RENAL FUNCTION PANEL: CPT

## 2023-08-04 PROCEDURE — 3074F SYST BP LT 130 MM HG: CPT | Mod: CPTII,,, | Performed by: NURSE PRACTITIONER

## 2023-08-04 PROCEDURE — 3044F HG A1C LEVEL LT 7.0%: CPT | Mod: CPTII,,, | Performed by: NURSE PRACTITIONER

## 2023-08-04 PROCEDURE — 99214 PR OFFICE/OUTPT VISIT, EST, LEVL IV, 30-39 MIN: ICD-10-PCS | Mod: S$PBB,,, | Performed by: NURSE PRACTITIONER

## 2023-08-04 NOTE — PROGRESS NOTES
Subjective     Patient ID: Araceli Cosby is a 63 y.o. female.    Chief Complaint: No chief complaint on file.    Endocrine clinic note 02/15/2023: 63 year old female her today for Endocrine clinic F/U for nontoxic multinodular goiter, and osteoporosis.  Patient had ultrasound on 09/16/2022 There is a cystic lesion seen in the right thyroid in the upper pole that measures 3 mm x 3 mm.  There is a mix attenuation nodule in the midpole that measures 4 mm x 4 mm.  There is another cystic area seen in the lower pole that measures 4 mm by 4 mm.  There are multiple areas of nodularity seen in the left thyroid.  Largest nodule measures 6 mm x 5 mm and is seen in the midpole.  Nodules do not meet size for criteria repeat 2 year.  Follow-up ultrasound  no nodules are palpated on exam today.  Previous thyroid functions within normal limits TSH 0.5092, free T4 1.35, free T3 2.98.  Osteoporosis patient had previous T11 fracture.  Note to chart indicates the patient has had multiple lumbar and pelvic fractures.  Discussed patient starting Prolia patient is currently undergoing dental work will complete her dental work and will research on Prolia to make an informed decision on starting.    Endocrine clinic note 08/04/2023:  63-year-old female scheduled today for endocrine clinic follow-up history of nontoxic multinodular goiter and osteoporosis. Multinodular goiter Ultrasound 09/21/2022, TSH 0.5092, free T4 1.35, free T3 2.98. Largest nodule 6 mm x 5 mm seen in midpole  Multi nodules none meeting criteria for FNA/ I will repeat follow-up ultrasound 2 years.  Hyperparathyroidism Previous Calcium level 10.5  Symptomatic patient reports she has tingling around her mouth and also at times feet. Ultrasound 09/21/2022 no parathyroid adenoma seen  NM parathyroid 02/16/2023 no adenoma seen.  Previously CT 4D parathyroid not ran due to patient has iodine allergy listed as a high reactive radiology not comfortable was completing CT at  that time.  Osteoporosis patient is seen specialist from [facility] she was supposed to be started on Evenity and had not received orders to go infusion clinic to start.  She states she was wanting to see a new dentist and has been x-rays 1st and she has a fractured tooth to correct before.  Instruct her she is to contact his doctor for orders to start her medication.  Patient has multiple fractures has not had any fractures since her previous visit      Result Note  Details      Reading Physician Reading Date Result Priority  Angelo Carver MD  [phone] 2/23/2023 Routine    Narrative & Impression  EXAMINATION:  NM PARATHYROID SCAN PLANAR     CLINICAL HISTORY:  Hyperparathyroidism, unspecified     TECHNIQUE:  Following injection of 25.9 99mTc sestamibi and approximately 10 minute delay, anterior projection images of the neck and chest were obtained. After a two-hour delay, repeat anterior projection images of the neck were acquired.     COMPARISON:  None.     FINDINGS:  There is physiological tracer uptake in the myocardium, salivary glands, and thyroid gland. Delayed images demonstrate near-complete tracer washout from the thyroid.     No focal abnormal persistent uptake is present on delayed images in the region of the parathyroid glands to suggest parathyroid adenoma.     Impression:     No abnormal uptake to suggest parathyroid adenoma        Electronically signed by: Angelo Cavrer MD  Date:                                            02/23/2023  Time:                                           14:26        Exam Ended: 02/23/23 11:47 Last Resulted: 02/23/23 14:26             Result Notes  Details        Reading Physician      Reading Date  Result Priority  Aaron Cardenas MD  [phone]  9/21/2022        Routine     Narrative & Impression  EXAMINATION:  US THYROID     CLINICAL HISTORY:  Nontoxic single thyroid nodule     TECHNIQUE:  Ultrasound of the thyroid and cervical lymph nodes was  performed.     COMPARISON:  None.     FINDINGS:  The right thyroid measures 5.1 x 1.4 x 1.6 cm and left thyroid measures 4.5 x 1 x 1.2 cm.  There are multiple nodules seen in both thyroid lobes.  Isthmus measures 1.2 mm.  The echotexture of the thyroid is diffusely heterogeneous bilaterally.  There is a cystic lesion seen in the right thyroid in the upper pole that measures 3 mm x 3 mm.  There is a mix attenuation nodule in the midpole that measures 4 mm x 4 mm.  There is another cystic area seen in the lower pole that measures 4 mm by 4 mm.  There are multiple areas of nodularity seen in the left thyroid.  Largest nodule measures 6 mm x 5 mm and is seen in the midpole.  To both lobes appears normal.     Impression:     Multinodular goiter bilaterally        Electronically signed by: Aaron Cardenas  Date:                                            09/21/2022  Time:                                           15:02         Review of Systems   Constitutional:  Negative for activity change, appetite change and fatigue.   HENT:  Negative for dental problem, hearing loss, tinnitus, trouble swallowing and goiter.    Eyes:  Negative for photophobia, pain and visual disturbance.   Respiratory:  Negative for cough, chest tightness and wheezing.    Cardiovascular:  Negative for chest pain, palpitations and leg swelling.   Gastrointestinal:  Negative for abdominal pain, constipation, diarrhea, nausea and reflux.   Endocrine: Negative for cold intolerance, heat intolerance, polydipsia and polyphagia.   Genitourinary:  Negative for difficulty urinating, flank pain, hematuria, hot flashes, menstrual irregularity, menstrual problem, nocturia and urgency.   Musculoskeletal:  Positive for back pain. Negative for gait problem, joint swelling, leg pain and joint deformity.   Integumentary:  Negative for color change, pallor, rash and breast discharge.   Allergic/Immunologic: Negative for environmental allergies, food allergies and  immunocompromised state.   Neurological:  Negative for tremors, seizures, headaches, coordination difficulties, memory loss and coordination difficulties.   Psychiatric/Behavioral:  Negative for agitation, behavioral problems and sleep disturbance. The patient is not nervous/anxious.           Objective     Physical Exam  Constitutional:       General: She is not in acute distress.     Appearance: Normal appearance. She is not ill-appearing.   HENT:      Head: Normocephalic and atraumatic.      Right Ear: External ear normal.      Left Ear: External ear normal.      Nose: Nose normal. No congestion or rhinorrhea.      Mouth/Throat:      Mouth: Mucous membranes are moist.      Pharynx: Oropharynx is clear. No oropharyngeal exudate.   Eyes:      General:         Right eye: No discharge.         Left eye: No discharge.      Conjunctiva/sclera: Conjunctivae normal.      Pupils: Pupils are equal, round, and reactive to light.   Neck:      Thyroid: No thyroid mass, thyromegaly or thyroid tenderness.   Cardiovascular:      Rate and Rhythm: Normal rate and regular rhythm.      Pulses: Normal pulses.      Heart sounds: Normal heart sounds. No murmur heard.  Pulmonary:      Effort: Pulmonary effort is normal. No respiratory distress.      Breath sounds: Normal breath sounds.   Abdominal:      General: Abdomen is flat. Bowel sounds are normal. There is no distension.      Palpations: Abdomen is soft.      Tenderness: There is no abdominal tenderness.   Musculoskeletal:         General: No swelling or tenderness. Normal range of motion.      Cervical back: Normal range of motion and neck supple. No tenderness.      Right lower leg: No edema.      Left lower leg: No edema.   Feet:      Right foot:      Skin integrity: Skin integrity normal.      Left foot:      Skin integrity: Skin integrity normal.   Lymphadenopathy:      Cervical: No cervical adenopathy.   Skin:     General: Skin is warm and dry.      Coloration: Skin is not  jaundiced or pale.   Neurological:      General: No focal deficit present.      Mental Status: She is alert and oriented to person, place, and time. Mental status is at baseline.      Coordination: Coordination normal.      Gait: Gait normal.   Psychiatric:         Mood and Affect: Mood normal.         Behavior: Behavior normal.         Thought Content: Thought content normal.            Assessment and Plan     1. Multinodular goiter  Ultrasound 09/21/2022  TSH 0.5092, free T4 1.35, free T3 2.98  Largest nodule 6 mm x 5 mm seen in midpole  Multi nodules none meeting criteria for FNA  Repeat follow-up ultrasound 2 years      2. Osteoporosis, unspecified osteoporosis type, unspecified pathological fracture presence  Had not started Prolia  Wants dental Xray prior   Making appt. W/ dentist   Seen Dr from [place] sent Evenity to infusion clinic pt has not heard from for appt. will F/u     3. Hyperparathyroidism  Previous Calcium level 10.5  Symptomatic  Ultrasound 09/21/2022 no parathyroid adenoma seen  NM parathyroid 02/16/2023 no adenoma seen         Component Ref Range & Units 5 mo ago 8 mo ago 6 yr ago   Sodium Level 136 - 145 mmol/L 138  140  139    Potassium Level 3.5 - 5.1 mmol/L 4.9  3.4 Low   3.5    Chloride 98 - 107 mmol/L 104  102  102    Carbon Dioxide 23 - 31 mmol/L 26  26  27.0 R    Glucose Level 82 - 115 mg/dL 75 Low   136 High   78 R    Blood Urea Nitrogen 9.8 - 20.1 mg/dL 6.2 Low   7.7 Low   7.0 R    Creatinine 0.55 - 1.02 mg/dL 0.63  0.66  0.71    Calcium Level Total 8.4 - 10.2 mg/dL 10.5 High   10.1  9.1 R         Component Ref Range & Units 5 mo ago 8 mo ago   Vit D 25 OH 30.0 - 80.0 ng/mL 68.9  90.2 High           Follow up in about 6 months (around 2/4/2024) for Hyperparathyroidism, Multinodular Goiter.    I spent a total of 30 minutes on the day of the visit.  This includes face to face time and non-face to face time preparing to see the patient (eg, review of tests), obtaining and/or reviewing  separately obtained history, documenting clinical information in the electronic or other health record, independently interpreting results and communicating results to the patient/family/caregiver, or care coordinator.

## 2023-08-04 NOTE — PROGRESS NOTES
Spoke with patient informed her vitamin-D, thyroid functions, and calcium level is currently normal. PTH remains elevated. Due to calcium level being normal Ms. Souza feels her symptoms may not be from the hypercalcemia may be sciatica or radiculopathy from her spine.  Voiced understanding.

## 2023-09-05 ENCOUNTER — OFFICE VISIT (OUTPATIENT)
Dept: OPHTHALMOLOGY | Facility: CLINIC | Age: 63
End: 2023-09-05
Payer: MEDICAID

## 2023-09-05 VITALS — WEIGHT: 117.94 LBS | BODY MASS INDEX: 18.96 KG/M2 | HEIGHT: 66 IN

## 2023-09-05 DIAGNOSIS — H01.00B BLEPHARITIS OF UPPER AND LOWER EYELIDS OF BOTH EYES, UNSPECIFIED TYPE: ICD-10-CM

## 2023-09-05 DIAGNOSIS — H01.00A BLEPHARITIS OF UPPER AND LOWER EYELIDS OF BOTH EYES, UNSPECIFIED TYPE: ICD-10-CM

## 2023-09-05 DIAGNOSIS — H02.889 MEIBOMIAN GLAND DISEASE, UNSPECIFIED LATERALITY: Primary | ICD-10-CM

## 2023-09-05 DIAGNOSIS — H04.129 DRY EYE: ICD-10-CM

## 2023-09-05 PROCEDURE — 92134 CPTRZ OPH DX IMG PST SGM RTA: CPT | Mod: PBBFAC,PO

## 2023-09-05 PROCEDURE — 99214 OFFICE O/P EST MOD 30 MIN: CPT | Mod: PBBFAC,PO

## 2023-09-05 RX ORDER — FAMOTIDINE 40 MG/1
40 TABLET, FILM COATED ORAL NIGHTLY
COMMUNITY
Start: 2023-05-04

## 2023-09-05 RX ORDER — IPRATROPIUM BROMIDE 42 UG/1
SPRAY, METERED NASAL
COMMUNITY
Start: 2023-07-05

## 2023-09-05 RX ORDER — CIPROFLOXACIN 500 MG/1
500 TABLET ORAL EVERY 12 HOURS
COMMUNITY
Start: 2023-08-25

## 2023-09-05 RX ORDER — ESOMEPRAZOLE MAGNESIUM 40 MG/1
40 CAPSULE, DELAYED RELEASE ORAL
Status: ON HOLD | COMMUNITY
Start: 2023-05-04 | End: 2023-10-12 | Stop reason: HOSPADM

## 2023-09-05 RX ORDER — FLUTICASONE PROPIONATE 50 MCG
1 SPRAY, SUSPENSION (ML) NASAL EVERY MORNING
COMMUNITY
Start: 2023-08-09

## 2023-09-05 RX ORDER — OXYCODONE AND ACETAMINOPHEN 10; 325 MG/1; MG/1
0.5 TABLET ORAL EVERY 6 HOURS PRN
Status: ON HOLD | COMMUNITY
Start: 2023-08-25 | End: 2023-10-12 | Stop reason: HOSPADM

## 2023-09-05 NOTE — PROGRESS NOTES
HPI    Dilated exam and K check  Last edited by Fariha Talbot MA on 9/5/2023  1:42 PM.          9/5/23  Assessment /Plan       OCT mac 9/5/23  252//253: normal FC OU no SRF or IRF. WNL    OCT RNFL 9/5/23  84//85: All green OU     1. Meibomian gland dysfunction (MGD) of both eyes with mild blepharitis  2. Allergic conjunctivitis of both eyes  - Schirmer's without anesthesia 10mm // 6mm at previous visits again on 9/29/15: 9mm // 8mm  - Punctal plugs replaced 2018  - LS/WC - blepharitis sheet given prior visit  - Chronic history of seasonal allergies, c/o itching/burning of eyes  - has only been using AT BID and no at ointment  - Punctal plug fell out OS -> reports that it had given her significant relief before it did  - Increase ATs 4-6X/day,   - start AT carlitos OU QHS  Symptoms much worse than signs. What size plug to try next?  - Continue [place] BID PRN for itching    3. History bilateral nongranulomatous uveitis  - Lab NDIAYE neg in past (neg RPR, ACE, HLA B27, CBC, EVELYN, CXR)  - Pt with Raynauds  - Saw rheumatology; per patient testing has been wnl and has yearly f/u with rheum  - Reports hx of multiple episodes of zoster infections. Could be underlying cause. Consider lab testing if uveitis reoccurs  - Quiet off of PF since July 2015. Continue to monitor off of gtts - quiet today    4. Flick xt OS  - intermittent diplopia; notes usually vertical; sometimes horizontal   - ortho primary  - ctm    5. Episodic anisocoria  - seen prior to dilation 6/30/22; minimal asymmetry <1mm; equal dark and light  - no RAPD  - EOM full  - Longstanding  - appears physiologic    RTC October 26th with Dr. Goldberg    Meibomian gland disease, unspecified laterality    Blepharitis of upper and lower eyelids of both eyes, unspecified type    Dry eye      Cristian Kay MD  [employer] Ophthalmology PGY-2

## 2023-10-12 ENCOUNTER — HOSPITAL ENCOUNTER (OUTPATIENT)
Facility: HOSPITAL | Age: 63
Discharge: HOME | End: 2023-10-12
Attending: INTERNAL MEDICINE | Admitting: INTERNAL MEDICINE
Payer: MEDICAID

## 2023-10-12 VITALS
BODY MASS INDEX: 19.27 KG/M2 | TEMPERATURE: 98 F | HEART RATE: 76 BPM | WEIGHT: 119.94 LBS | OXYGEN SATURATION: 100 % | DIASTOLIC BLOOD PRESSURE: 93 MMHG | SYSTOLIC BLOOD PRESSURE: 154 MMHG | RESPIRATION RATE: 19 BRPM | HEIGHT: 66 IN

## 2023-10-12 DIAGNOSIS — I49.3 PVC (PREMATURE VENTRICULAR CONTRACTION): ICD-10-CM

## 2023-10-12 DIAGNOSIS — I49.9 ARRHYTHMIA: ICD-10-CM

## 2023-10-12 DIAGNOSIS — Z45.010 ENCOUNTER FOR PACEMAKER AT END OF BATTERY LIFE: ICD-10-CM

## 2023-10-12 PROCEDURE — 33228 REMV&REPLC PM GEN DUAL LEAD: CPT | Performed by: INTERNAL MEDICINE

## 2023-10-12 PROCEDURE — 27201423 OPTIME MED/SURG SUP & DEVICES STERILE SUPPLY: Performed by: INTERNAL MEDICINE

## 2023-10-12 PROCEDURE — 93005 ELECTROCARDIOGRAM TRACING: CPT | Mod: 59

## 2023-10-12 PROCEDURE — 93010 ELECTROCARDIOGRAM REPORT: CPT | Mod: ,,, | Performed by: INTERNAL MEDICINE

## 2023-10-12 PROCEDURE — 25000003 PHARM REV CODE 250: Performed by: INTERNAL MEDICINE

## 2023-10-12 PROCEDURE — 99153 MOD SED SAME PHYS/QHP EA: CPT | Performed by: INTERNAL MEDICINE

## 2023-10-12 PROCEDURE — 99152 MOD SED SAME PHYS/QHP 5/>YRS: CPT | Performed by: INTERNAL MEDICINE

## 2023-10-12 PROCEDURE — C1785 PMKR, DUAL, RATE-RESP: HCPCS | Performed by: INTERNAL MEDICINE

## 2023-10-12 PROCEDURE — 63600175 PHARM REV CODE 636 W HCPCS: Performed by: INTERNAL MEDICINE

## 2023-10-12 PROCEDURE — 93010 EKG 12-LEAD: ICD-10-PCS | Mod: ,,, | Performed by: INTERNAL MEDICINE

## 2023-10-12 DEVICE — PACEMAKER
Type: IMPLANTABLE DEVICE | Site: CHEST  WALL | Status: FUNCTIONAL
Brand: ACCOLADE™ MRI EL DR

## 2023-10-12 RX ORDER — LIDOCAINE HYDROCHLORIDE 10 MG/ML
INJECTION INFILTRATION; PERINEURAL
Status: DISCONTINUED | OUTPATIENT
Start: 2023-10-12 | End: 2023-10-12 | Stop reason: HOSPADM

## 2023-10-12 RX ORDER — DIPHENHYDRAMINE HCL 50 MG
50 CAPSULE ORAL EVERY 6 HOURS PRN
COMMUNITY

## 2023-10-12 RX ORDER — VANCOMYCIN HYDROCHLORIDE 1 G/20ML
1000 INJECTION, POWDER, LYOPHILIZED, FOR SOLUTION INTRAVENOUS
Status: DISPENSED | OUTPATIENT
Start: 2023-10-12

## 2023-10-12 RX ORDER — DIPHENHYDRAMINE HYDROCHLORIDE 50 MG/ML
INJECTION INTRAMUSCULAR; INTRAVENOUS
Status: DISCONTINUED | OUTPATIENT
Start: 2023-10-12 | End: 2023-10-12 | Stop reason: HOSPADM

## 2023-10-12 RX ORDER — PREDNISONE 50 MG/1
50 TABLET ORAL ONCE
COMMUNITY
Start: 2023-09-18

## 2023-10-12 RX ORDER — ACETAMINOPHEN 325 MG/1
650 TABLET ORAL EVERY 4 HOURS PRN
Status: DISCONTINUED | OUTPATIENT
Start: 2023-10-12 | End: 2023-10-12 | Stop reason: HOSPADM

## 2023-10-12 RX ORDER — FENTANYL CITRATE 50 UG/ML
INJECTION, SOLUTION INTRAMUSCULAR; INTRAVENOUS
Status: DISCONTINUED | OUTPATIENT
Start: 2023-10-12 | End: 2023-10-12 | Stop reason: HOSPADM

## 2023-10-12 RX ORDER — MIDAZOLAM HYDROCHLORIDE 1 MG/ML
INJECTION INTRAMUSCULAR; INTRAVENOUS
Status: DISCONTINUED | OUTPATIENT
Start: 2023-10-12 | End: 2023-10-12 | Stop reason: HOSPADM

## 2023-10-12 RX ORDER — SODIUM CHLORIDE 9 MG/ML
INJECTION, SOLUTION INTRAVENOUS ONCE
Status: COMPLETED | OUTPATIENT
Start: 2023-10-12 | End: 2023-10-12

## 2023-10-12 RX ADMIN — SODIUM CHLORIDE: 9 INJECTION, SOLUTION INTRAVENOUS at 08:10

## 2023-10-12 NOTE — Clinical Note
There was an existing generator. The generator was removed. The leads were disconnected. The generator was returned due to DANIEL/EOL

## 2023-10-12 NOTE — DISCHARGE SUMMARY
[facility] - Cath Lab Services  Discharge Note  Short Stay    Procedure(s) (LRB):  REPLACEMENT, PACEMAKER GENERATOR (N/A)      OUTCOME: Patient tolerated treatment/procedure well without complication and is now ready for discharge.    DISPOSITION: Home or Self Care    FINAL DIAGNOSIS:  Encounter for pacemaker at end of battery life    FOLLOWUP: In clinic    DISCHARGE INSTRUCTIONS:  No discharge procedures on file.     TIME SPENT ON DISCHARGE: 15 minutes

## 2023-10-26 ENCOUNTER — OFFICE VISIT (OUTPATIENT)
Dept: OPHTHALMOLOGY | Facility: CLINIC | Age: 63
End: 2023-10-26
Payer: MEDICAID

## 2023-10-26 VITALS — HEIGHT: 66 IN | BODY MASS INDEX: 19.27 KG/M2 | WEIGHT: 119.94 LBS

## 2023-10-26 DIAGNOSIS — H04.129 DRY EYE: ICD-10-CM

## 2023-10-26 DIAGNOSIS — H02.889 MEIBOMIAN GLAND DISEASE, UNSPECIFIED LATERALITY: Primary | ICD-10-CM

## 2023-10-26 PROCEDURE — 68761 CLOSE TEAR DUCT OPENING: CPT | Mod: PBBFAC,PN | Performed by: STUDENT IN AN ORGANIZED HEALTH CARE EDUCATION/TRAINING PROGRAM

## 2023-10-26 PROCEDURE — 99214 OFFICE O/P EST MOD 30 MIN: CPT | Mod: PBBFAC,PN,25 | Performed by: STUDENT IN AN ORGANIZED HEALTH CARE EDUCATION/TRAINING PROGRAM

## 2023-10-26 NOTE — PROGRESS NOTES
HPI    October 26th with Dr. Goldberg    Using OTC tears daily and have been using allergy drops twice a day for   the last 19 years.   Last edited by Fariha Talbot MA on 10/26/2023 12:25 PM.        Assessment /Plan     OCT mac 9/5/23  252//253: normal FC OU no SRF or IRF. WNL    OCT RNFL 9/5/23  84//85: All green OU     1. Meibomian gland dysfunction (MGD) of both eyes with mild blepharitis  2. Allergic conjunctivitis of both eyes  - Schirmer's without anesthesia 10mm // 6mm at previous visits again on 9/29/15: 9mm // 8mm  - Punctal plugs replaced 2018  - LS/WC - blepharitis sheet given prior visit  - Chronic history of seasonal allergies, c/o itching/burning of eyes  10/26/23: patient notes good relief with punctal plugs. Replaced ou lower eyelids.   - Continue [place] BID PRN for itching  - ATs 4-6X/day,   - AT carlitos OU QHS    3. History bilateral nongranulomatous uveitis  - Lab NDIAYE neg in past (neg RPR, ACE, HLA B27, CBC, EVELYN, CXR)  - Pt with Raynauds  - Saw rheumatology; per patient testing has been wnl and has yearly f/u with rheum  - Reports hx of multiple episodes of zoster infections. Could be underlying cause. Consider lab testing if uveitis reoccurs  - Quiet off of PF since July 2015. Continue to monitor off of gtts - quiet today    4. Flick xt OS  - intermittent diplopia; notes usually vertical; sometimes horizontal   - ortho primary  - ctm    5. physiologic anisocoria  - seen prior to dilation 6/30/22; minimal asymmetry <1mm; equal dark and light  - no RAPD  - EOM full  - Longstanding    RTC 3months K check      Procedure Note: punctum occlusion by plug OS  Attending:  Scott Goldberg MD  Resident:  Jak Jung MD  Anesthesia:  topical proparacaine   Specimens:  None  Complications:  None    Procedure details:  Informed consent obtained after extensive risks/benefits/alternatives were discussed with the patient including but not limited to pain, bleeding, infection, ocular injury, loss of the eye, need  for revision, need for further treatment. All questions were answered.  Patient wished to proceed.  Consent was obtained.    Topical proparacaine was instilled left eye.  0.6mm punctal plug with punctal dilator on applicator was used. The dilator portion was placed to dilate lower left punctum. 0.6mm punctal plug was then dispensed into punctum. Good fit ensured. The patient tolerated the procedure without complications.

## 2023-11-08 ENCOUNTER — OFFICE VISIT (OUTPATIENT)
Dept: GYNECOLOGY | Facility: CLINIC | Age: 63
End: 2023-11-08
Payer: MEDICAID

## 2023-11-08 VITALS
HEART RATE: 79 BPM | SYSTOLIC BLOOD PRESSURE: 128 MMHG | DIASTOLIC BLOOD PRESSURE: 75 MMHG | HEIGHT: 66 IN | OXYGEN SATURATION: 100 % | BODY MASS INDEX: 19.9 KG/M2 | WEIGHT: 123.81 LBS | RESPIRATION RATE: 18 BRPM | TEMPERATURE: 99 F

## 2023-11-08 DIAGNOSIS — Z01.419 WOMEN'S ANNUAL ROUTINE GYNECOLOGICAL EXAMINATION: Primary | ICD-10-CM

## 2023-11-08 DIAGNOSIS — R33.9 INCOMPLETE BLADDER EMPTYING: ICD-10-CM

## 2023-11-08 DIAGNOSIS — Z86.19 HX OF HERPES GENITALIS: ICD-10-CM

## 2023-11-08 DIAGNOSIS — Z11.3 ROUTINE SCREENING FOR STI (SEXUALLY TRANSMITTED INFECTION): ICD-10-CM

## 2023-11-08 LAB
BILIRUB SERPL-MCNC: NEGATIVE MG/DL
BLOOD URINE, POC: NEGATIVE
C TRACH DNA SPEC QL NAA+PROBE: NOT DETECTED
COLOR, POC UA: YELLOW
GLUCOSE UR QL STRIP: NEGATIVE
KETONES UR QL STRIP: NEGATIVE
LEUKOCYTE ESTERASE URINE, POC: NEGATIVE
N GONORRHOEA DNA SPEC QL NAA+PROBE: NOT DETECTED
NITRITE, POC UA: NEGATIVE
PH, POC UA: 7
PROTEIN, POC: NEGATIVE
SOURCE (OHS): NORMAL
SPECIFIC GRAVITY, POC UA: 1.02
UROBILINOGEN, POC UA: 0.2

## 2023-11-08 PROCEDURE — 3044F HG A1C LEVEL LT 7.0%: CPT | Mod: CPTII,,, | Performed by: NURSE PRACTITIONER

## 2023-11-08 PROCEDURE — 99215 OFFICE O/P EST HI 40 MIN: CPT | Mod: PBBFAC | Performed by: NURSE PRACTITIONER

## 2023-11-08 PROCEDURE — 3008F PR BODY MASS INDEX (BMI) DOCUMENTED: ICD-10-PCS | Mod: CPTII,,, | Performed by: NURSE PRACTITIONER

## 2023-11-08 PROCEDURE — 1159F MED LIST DOCD IN RCRD: CPT | Mod: CPTII,,, | Performed by: NURSE PRACTITIONER

## 2023-11-08 PROCEDURE — 1160F RVW MEDS BY RX/DR IN RCRD: CPT | Mod: CPTII,,, | Performed by: NURSE PRACTITIONER

## 2023-11-08 PROCEDURE — 1160F PR REVIEW ALL MEDS BY PRESCRIBER/CLIN PHARMACIST DOCUMENTED: ICD-10-PCS | Mod: CPTII,,, | Performed by: NURSE PRACTITIONER

## 2023-11-08 PROCEDURE — 1159F PR MEDICATION LIST DOCUMENTED IN MEDICAL RECORD: ICD-10-PCS | Mod: CPTII,,, | Performed by: NURSE PRACTITIONER

## 2023-11-08 PROCEDURE — 87491 CHLMYD TRACH DNA AMP PROBE: CPT | Performed by: NURSE PRACTITIONER

## 2023-11-08 PROCEDURE — 3074F SYST BP LT 130 MM HG: CPT | Mod: CPTII,,, | Performed by: NURSE PRACTITIONER

## 2023-11-08 PROCEDURE — 3074F PR MOST RECENT SYSTOLIC BLOOD PRESSURE < 130 MM HG: ICD-10-PCS | Mod: CPTII,,, | Performed by: NURSE PRACTITIONER

## 2023-11-08 PROCEDURE — 3078F DIAST BP <80 MM HG: CPT | Mod: CPTII,,, | Performed by: NURSE PRACTITIONER

## 2023-11-08 PROCEDURE — 3044F PR MOST RECENT HEMOGLOBIN A1C LEVEL <7.0%: ICD-10-PCS | Mod: CPTII,,, | Performed by: NURSE PRACTITIONER

## 2023-11-08 PROCEDURE — 81001 URINALYSIS AUTO W/SCOPE: CPT | Mod: PBBFAC | Performed by: NURSE PRACTITIONER

## 2023-11-08 PROCEDURE — 3008F BODY MASS INDEX DOCD: CPT | Mod: CPTII,,, | Performed by: NURSE PRACTITIONER

## 2023-11-08 PROCEDURE — 99396 PREV VISIT EST AGE 40-64: CPT | Mod: S$PBB,,, | Performed by: NURSE PRACTITIONER

## 2023-11-08 PROCEDURE — 99396 PR PREVENTIVE VISIT,EST,40-64: ICD-10-PCS | Mod: S$PBB,,, | Performed by: NURSE PRACTITIONER

## 2023-11-08 PROCEDURE — 87591 N.GONORRHOEAE DNA AMP PROB: CPT | Performed by: NURSE PRACTITIONER

## 2023-11-08 PROCEDURE — 3078F PR MOST RECENT DIASTOLIC BLOOD PRESSURE < 80 MM HG: ICD-10-PCS | Mod: CPTII,,, | Performed by: NURSE PRACTITIONER

## 2023-11-08 RX ORDER — VALACYCLOVIR HYDROCHLORIDE 500 MG/1
500 TABLET, FILM COATED ORAL 2 TIMES DAILY
Qty: 6 TABLET | Refills: 2 | Status: SHIPPED | OUTPATIENT
Start: 2023-11-08 | End: 2024-03-08

## 2023-11-08 NOTE — PROGRESS NOTES
"Patient ID: Araceli Cosby is a 63 y.o. female.    Chief Complaint: Annual Exam              HPI:  The patient is G0 here for annual gyn exam.Denies hx of abnormal pap. Reports hx of hysterectomy in her late 20s/early 30s for CPP/suspected endometriosis. Reports also had bilateral oophorectomy for recurrent ovarian cysts. Pt states attempted to take HRT (multiple formulations)but could not tolerate any.She has known osteoporosis and is followed by endocrine for this. States is UTD on mammogram and this is ordered by her pcp. Pt states is not sexually active. Denies vaginal discharge. Reports occasional itching to left vaginal fold. Denies rash. Reports hx herpes. States has not had any recent outbreaks. Pt denies dysuria but reports incomplete bladder emptying, urgency, and occasional urge incontinence. States was followed by urology in the past but has not been established recently. Reports hx of IC and attempted DSMO in the past but could not tolerate. Denies fly hx of breast/ovarian/uterine/colon cancer.   Review of Systems:   Negative except for findings in HPI     Objective:   /75   Pulse 79   Temp 98.7 °F (37.1 °C) (Oral)   Resp 18   Ht 5' 6" (1.676 m)   Wt 56.2 kg (123 lb 12.8 oz)   SpO2 100%   BMI 19.98 kg/m²    Physical Exam:  GENERAL: Pt is aware and alert and  in no acute distress.  BREASTS: Bilateral-No masses, nipple discharge, skin changes, or tenderness.minimal breast tissue  ABDOMEN: Soft, non tender.transverse scar  VULVA:  No lesions or skin changes.  URETHRA: No lesions  BLADDER: No tenderness.  VAGINA: Mucosa atrophic,no discharge; no lesions.  CERVIX:absent  BIMANUAL EXAM: The uterus is absent. Paul adnexa reveal no evidence of masses; no fullness   SKIN: Warm and Dry  PSYCHIATRIC: Patient is awake and alert. Mood and affect are normal.    Assessment:   Women's annual routine gynecological examination    Incomplete bladder emptying  -     POCT urinalysis, dipstick or tablet " reag  -     Ambulatory referral/consult to Urology; Future; Expected date: 12/08/2023    Hx of herpes genitalis    Other orders  -     valACYclovir (VALTREX) 500 MG tablet; Take 1 tablet (500 mg total) by mouth 2 (two) times daily. for 3 days  Dispense: 6 tablet; Refill: 2            1. Women's annual routine gynecological examination    2. Incomplete bladder emptying    3. Hx of herpes genitalis             -pelvic; pap deferred secondary to hyst for benign conditions  -refer to urology for bladder complaints; ua wnl in clinic  -discussed treatment for atrophic vaginitis but pt declined  -declined sti screening  -states mammogram is UTD  -pt not truly allergic to valtrex/acylovir(denies hx of hives/rash/sob)-rx given to take as needed for outbreaks  -no abnormalities noted on vulvar exam; avoid irritants; may use otc barrier ointment aquaphor bid   Plan:       Follow up in about 1 year (around 11/8/2024).          Pt returned to clinic after discharge requesting sti screening. Labs placed

## 2024-01-11 ENCOUNTER — OFFICE VISIT (OUTPATIENT)
Dept: UROLOGY | Facility: CLINIC | Age: 64
End: 2024-01-11
Payer: MEDICAID

## 2024-01-11 VITALS
OXYGEN SATURATION: 98 % | DIASTOLIC BLOOD PRESSURE: 74 MMHG | RESPIRATION RATE: 18 BRPM | HEIGHT: 66 IN | WEIGHT: 125 LBS | SYSTOLIC BLOOD PRESSURE: 132 MMHG | HEART RATE: 69 BPM | BODY MASS INDEX: 20.09 KG/M2

## 2024-01-11 DIAGNOSIS — E21.3 HYPERPARATHYROIDISM: ICD-10-CM

## 2024-01-11 DIAGNOSIS — N39.41 URGE INCONTINENCE: ICD-10-CM

## 2024-01-11 DIAGNOSIS — N30.10 INTERSTITIAL CYSTITIS: Primary | ICD-10-CM

## 2024-01-11 LAB
BILIRUB SERPL-MCNC: NORMAL MG/DL
BLOOD URINE, POC: NORMAL
COLOR, POC UA: YELLOW
GLUCOSE UR QL STRIP: NORMAL
KETONES UR QL STRIP: NORMAL
LEUKOCYTE ESTERASE URINE, POC: NORMAL
NITRITE, POC UA: NORMAL
PH, POC UA: 7
POC RESIDUAL URINE VOLUME: 72 ML (ref 0–100)
PROTEIN, POC: NORMAL
SPECIFIC GRAVITY, POC UA: 1.01
UROBILINOGEN, POC UA: 0.2

## 2024-01-11 PROCEDURE — 3078F DIAST BP <80 MM HG: CPT | Mod: CPTII,,, | Performed by: UROLOGY

## 2024-01-11 PROCEDURE — 3008F BODY MASS INDEX DOCD: CPT | Mod: CPTII,,, | Performed by: UROLOGY

## 2024-01-11 PROCEDURE — 99215 OFFICE O/P EST HI 40 MIN: CPT | Mod: PBBFAC | Performed by: UROLOGY

## 2024-01-11 PROCEDURE — 81001 URINALYSIS AUTO W/SCOPE: CPT | Mod: PBBFAC | Performed by: UROLOGY

## 2024-01-11 PROCEDURE — 1159F MED LIST DOCD IN RCRD: CPT | Mod: CPTII,,, | Performed by: UROLOGY

## 2024-01-11 PROCEDURE — 51798 US URINE CAPACITY MEASURE: CPT | Mod: PBBFAC | Performed by: UROLOGY

## 2024-01-11 PROCEDURE — 99204 OFFICE O/P NEW MOD 45 MIN: CPT | Mod: S$PBB,,, | Performed by: UROLOGY

## 2024-01-11 PROCEDURE — 1160F RVW MEDS BY RX/DR IN RCRD: CPT | Mod: CPTII,,, | Performed by: UROLOGY

## 2024-01-11 PROCEDURE — 3075F SYST BP GE 130 - 139MM HG: CPT | Mod: CPTII,,, | Performed by: UROLOGY

## 2024-01-11 NOTE — PATIENT INSTRUCTIONS
Bladder Instillation   Why is this procedure done?   Bladder instillation therapy is when a liquid drug is used to coat the inside of your bladder. Your doctor may order this procedure to:  Treat bladder pain or interstitial cystitis  Treat recurrent bladder infections  Prevent bladder stones  What happens before the procedure?   Your doctor will ask about your health history. Talk to the doctor about:  All the drugs you are taking. Be sure to include all prescription, over the counter, vitamins, and herbal supplements. Bring a list of drugs you take with you. Tell the doctor if you have any drug allergy.  If you take a diuretic, ask the doctor if you should take this drug the morning of your treatment.  If you think you may have a bladder infection, if you have had any fevers, or have had any recent blood in your urine  Any surgeries you have had on your bladder, kidneys, urethra, or ureters  If you need to limit fluids or avoid caffeine before the procedure  The doctor may order tests and check your urine for blood or infection.  What happens during the procedure?   The staff will place a thin, flexible tube through your urethra into your bladder. This is a catheter. The catheter will drain any urine from your bladder.  The doctor will place a mixture of fluid and drugs through the catheter into your bladder. The doctor may heat the fluid before giving it to you.  You will hold the fluid and drugs inside your bladder for a time. Talk with your doctor to find out how long you need to hold the drugs in your bladder to help them work. This could be anywhere from a few minutes to a few hours. Ask your doctor if you need to change positions while the fluid is in your bladder.  What happens after the procedure?   You may have to stay in the doctor's office or hospital for a time while the drugs are in your bladder.  The doctor may take the catheter out right away or it may be left in place to help drain out the fluid  and then be taken out afterwards.  If the doctor takes the catheter out, you will sit on the toilet and pass urine so the urine does not splash when you are allowed to get rid of the fluids and drugs from your bladder.  What care is needed at home?   Ask your doctor what you need to do when you go home. Make sure you ask questions if you do not understand everything the doctor says.  Your doctor may ask you to drink extra fluids after your procedure.  What follow-up care is needed?   Your doctor may ask you to come back to the office to check on your progress. Be sure to keep these visits.  You may need to have more bladder instillations. Talk to your doctor about how many treatments you may need.  What problems could happen?   Bladder or belly pain  Passing urine often  Urinary tract or prostate infection  Severe infection throughout the body  Blood in the urine  Burning feeling in bladder  Feeling tired  Fever  Where can I learn more?   American Cancer Society  https://www.cancer.org/cancer/bladder-cancer/treating/intravesical-therapy.html   [place] [employer]  https://www.baus.org.uk/_userfiles/pages/files/Patients/Leaflets/Painful%20bladder.pdf   Last Reviewed Date   2021-03-18  Consumer Information Use and Disclaimer   This information is not specific medical advice and does not replace information you receive from your health care provider. This is only a brief summary of general information. It does NOT include all information about conditions, illnesses, injuries, tests, procedures, treatments, therapies, discharge instructions or life-style choices that may apply to you. You must talk with your health care provider for complete information about your health and treatment options. This information should not be used to decide whether or not to accept your health care providers advice, instructions or recommendations. Only your health care provider has the knowledge and training to  provide advice that is right for you.  Copyright   Copyright © 2021 UpToDate, Inc. and its affiliates and/or licensors. All rights reserved.

## 2024-01-11 NOTE — PROGRESS NOTES
CC:  Interstitial cystitis    HPI:  Araceli Cosby is a 63 y.o. female seen in consultation for interstitial cystitis.  She was diagnosed with IC in the late 1980's or early 1990's.  She had DMSO done in 1999 but she had a lot of bladder spasms with that so she stopped taking it.  She tries to follow an IC diet.  She recently had pelvic physical therapy for fractures and it helped her with the bladder and she requests to have more treatments.    She gets the sensation that she has to urinate and then will go and won't be able to.  She doesn't feel like the bladder empties because she feels like the bladder is irritated and swollen.  She has also been having some post void dribbling.    She was recently diagnosed with hyperparthyroidism.  She denies any history of kidney stones.  She has tried Elmiron.    She complains of urgency and urge incontinence.  She denies stress urinary incontinence.      Bladder scan residual:  72 ml    Urinalysis:  Results for orders placed or performed in visit on 01/11/24   POCT URINE DIPSTICK WITH MICROSCOPE, AUTOMATED   Result Value Ref Range    Color, UA Yellow     Spec Grav UA 1.015     pH, UA 7.0     WBC, UA neg     Nitrite, UA neg     Protein, POC neg     Glucose, UA neg     Ketones, UA neg     Urobilinogen, UA 0.2     Bilirubin, POC neg     Blood, UA trace-intact      Microscopic: 0-1 per HPF        Data Review:  Note from referring provider, HUMPHREY Mendiola dated 8 November 2023.  .     ROS:  All systems reviewed and are negative except as documented in HPI and/or Assessment and Plan.     Patient Active Problem List:     Patient Active Problem List   Diagnosis    Meibomian gland disease    Blepharitis of upper and lower eyelids of both eyes    Age related osteoporosis    Trigger finger, left index finger    Carpal tunnel syndrome on left    Interstitial cystitis    Chronic pelvic pain in female    Encounter for pacemaker at end of battery life        Past Medical  History:  Past Medical History:   Diagnosis Date    Allergies     Fibromyalgia     GERD (gastroesophageal reflux disease)     Interstitial cystitis     Mitral valve prolapse     Muscle disease     Osteoarthritis     Osteoporosis     Skin cancer         Past Surgical History:  Past Surgical History:   Procedure Laterality Date    APPENDECTOMY      CARPAL TUNNEL RELEASE Right     FINGER SURGERY N/A     left thumb    HYSTERECTOMY      pace maker      REPLACEMENT OF PACEMAKER GENERATOR N/A 10/12/2023    Procedure: REPLACEMENT, PACEMAKER GENERATOR;  Surgeon: Austin Willis MD;  Location: [facility] CATH LAB;  Service: Cardiology;  Laterality: N/A;  PPM GEN CHANGE (BS) MAY NEED TEMP PACING    RIB FRACTURE SURGERY Right     1st rib and extra rib removed        Family History:  Family History   Problem Relation Age of Onset    Arthritis Mother     No Known Problems Father     No Known Problems Sister     No Known Problems Brother     No Known Problems Maternal Aunt     No Known Problems Maternal Uncle     No Known Problems Paternal Aunt     No Known Problems Paternal Uncle     No Known Problems Maternal Grandmother     No Known Problems Maternal Grandfather     No Known Problems Paternal Grandmother     No Known Problems Paternal Grandfather     Aneurysm Neg Hx     Cancer Neg Hx     Clotting disorder Neg Hx     Dementia Neg Hx     Diabetes Neg Hx     Fainting Neg Hx     Heart disease Neg Hx     Hyperlipidemia Neg Hx     Kidney disease Neg Hx     Liver disease Neg Hx     Migraines Neg Hx     Neuropathy Neg Hx     Obesity Neg Hx     Parkinsonism Neg Hx     Seizures Neg Hx     Stroke Neg Hx     Tremor Neg Hx         Social History:  Social History     Socioeconomic History    Marital status: Single   Tobacco Use    Smoking status: Former     Current packs/day: 0.00     Types: Cigarettes     Quit date:      Years since quittin.0     Passive exposure: Past    Smokeless tobacco: Former     Quit date:    Substance and Sexual  Activity    Alcohol use: Not Currently    Drug use: Never    Sexual activity: Not Currently        Allergies:  Review of patient's allergies indicates:   Allergen Reactions    Amlodipine Swelling    Cardizem [diltiazem hcl] Swelling    Iodinated contrast media Rash    Metoprolol Shortness Of Breath    Acyclovir analogues Other (See Comments)     Headache    Azithromycin Other (See Comments)     Hard on stomach    Cephalexin Other (See Comments)    Gabapentin      Other reaction(s): GI Upset, Headache    Hydromorphone     Ibuprofen-famotidine      Other reaction(s): GI Upset    Nsaids (non-steroidal anti-inflammatory drug)     Paroxetine Other (See Comments)     Other reaction(s): Cognitive Disruption    Serotonin 5ht-3 antagonists     Tramadol Other (See Comments)     nervousness    Wellbutrin [bupropion hcl] Other (See Comments)     headache    Ciprofloxacin Nausea And Vomiting     Other reaction(s): GI Upset, Headache    Codeine Rash    Cyclobenzaprine Rash    Doxycycline Rash    Iodine and iodide containing products Rash    Paxil [paroxetine hcl] Anxiety    Sulfa (sulfonamide antibiotics) Rash        Objective:  Vitals:    01/11/24 1043   BP: 132/74   Pulse: 69   Resp: 18     General:  Well developed, well nourished adult female in no acute distress  Abdomen: Soft, nontender, no masses  Extremities:  No clubbing, cyanosis, or edema  Neurologic:  Grossly intact  Musculoskeletal:  Normal tone    Assessment:  1. Interstitial cystitis  - Ambulatory referral/consult to Urology  - POCT URINE DIPSTICK WITH MICROSCOPE, AUTOMATED  - POCT Bladder Scan  - Ambulatory referral/consult to Physical/Occupational Therapy; Future    2. Hyperparathyroidism  - CT Abdomen Pelvis  Without Contrast; Future    3. Urge incontinence  - Ambulatory referral/consult to Physical/Occupational Therapy; Future     Plan:  I discussed IC with the patient and the option of bladder instillations. She would like to do the bladder instillations.  She  also may get some benefit from the physical therapy.  Schedule her for a cystoscopy to make sure that there is not something going on other than the interstitial cystitis.  She needs a stone protocol CT to rule out kidney stones with the hyperparathyroidism.   She was referred to physical therapy see if this will help with the urge incontinence.  If she does not see any benefit can consider trial of Myrbetriq however with her multiple allergies I would like if that helps before starting a medication.    Follow-up:    For instillations and for a cystoscopy.

## 2024-01-11 NOTE — PROGRESS NOTES
Patient seen by Dr. Marie. Orders for CT will be placed today. Pt will get instillations starting next week on Friday. Pt will be scheduled for next available cysto also with a referral to physical therapy.  Pt education given both written and verbal.

## 2024-01-19 ENCOUNTER — CLINICAL SUPPORT (OUTPATIENT)
Dept: UROLOGY | Facility: CLINIC | Age: 64
End: 2024-01-19
Payer: MEDICAID

## 2024-01-19 DIAGNOSIS — N30.10 INTERSTITIAL CYSTITIS: Primary | ICD-10-CM

## 2024-01-19 PROCEDURE — 51700 IRRIGATION OF BLADDER: CPT | Mod: S$PBB,,, | Performed by: NURSE PRACTITIONER

## 2024-01-19 PROCEDURE — 51700 IRRIGATION OF BLADDER: CPT | Mod: PBBFAC | Performed by: NURSE PRACTITIONER

## 2024-01-19 PROCEDURE — 99212 OFFICE O/P EST SF 10 MIN: CPT | Mod: PBBFAC,25

## 2024-01-19 PROCEDURE — 96372 THER/PROPH/DIAG INJ SC/IM: CPT | Mod: PBBFAC

## 2024-01-19 RX ORDER — SODIUM BICARBONATE 42 MG/ML
5 INJECTION, SOLUTION INTRAVENOUS
Status: COMPLETED | OUTPATIENT
Start: 2024-01-19 | End: 2024-01-19

## 2024-01-19 RX ORDER — HEPARIN SODIUM 5000 [USP'U]/ML
40000 INJECTION, SOLUTION INTRAVENOUS; SUBCUTANEOUS
Status: COMPLETED | OUTPATIENT
Start: 2024-01-19 | End: 2024-01-19

## 2024-01-19 RX ORDER — LIDOCAINE HYDROCHLORIDE 20 MG/ML
5 INJECTION, SOLUTION INFILTRATION; PERINEURAL
Status: COMPLETED | OUTPATIENT
Start: 2024-01-19 | End: 2024-01-19

## 2024-01-19 RX ORDER — DEXAMETHASONE SODIUM PHOSPHATE 100 MG/10ML
100 INJECTION INTRAMUSCULAR; INTRAVENOUS
Status: COMPLETED | OUTPATIENT
Start: 2024-01-19 | End: 2024-01-19

## 2024-01-19 RX ADMIN — DEXAMETHASONE SODIUM PHOSPHATE 100 MG: 10 INJECTION, SOLUTION INTRAMUSCULAR; INTRAVENOUS at 10:01

## 2024-01-19 RX ADMIN — HEPARIN SODIUM 40000 UNITS: 5000 INJECTION, SOLUTION INTRAVENOUS; SUBCUTANEOUS at 10:01

## 2024-01-19 RX ADMIN — SODIUM BICARBONATE 10 ML: 42 INJECTION, SOLUTION INTRAVENOUS at 10:01

## 2024-01-19 RX ADMIN — LIDOCAINE HYDROCHLORIDE 5 ML: 20 INJECTION, SOLUTION INFILTRATION; PERINEURAL at 10:01

## 2024-01-19 NOTE — PATIENT INSTRUCTIONS
Bladder Instillation   Why is this procedure done?   Bladder instillation therapy is when a liquid drug is used to coat the inside of your bladder. Your doctor may order this procedure to:  Treat bladder pain or interstitial cystitis  Treat recurrent bladder infections  Prevent bladder stones  What happens before the procedure?   Your doctor will ask about your health history. Talk to the doctor about:  All the drugs you are taking. Be sure to include all prescription, over the counter, vitamins, and herbal supplements. Bring a list of drugs you take with you. Tell the doctor if you have any drug allergy.  If you take a diuretic, ask the doctor if you should take this drug the morning of your treatment.  If you think you may have a bladder infection, if you have had any fevers, or have had any recent blood in your urine  Any surgeries you have had on your bladder, kidneys, urethra, or ureters  If you need to limit fluids or avoid caffeine before the procedure  The doctor may order tests and check your urine for blood or infection.  What happens during the procedure?   The staff will place a thin, flexible tube through your urethra into your bladder. This is a catheter. The catheter will drain any urine from your bladder.  The doctor will place a mixture of fluid and drugs through the catheter into your bladder. The doctor may heat the fluid before giving it to you.  You will hold the fluid and drugs inside your bladder for a time. Talk with your doctor to find out how long you need to hold the drugs in your bladder to help them work. This could be anywhere from a few minutes to a few hours. Ask your doctor if you need to change positions while the fluid is in your bladder.  What happens after the procedure?   You may have to stay in the doctor's office or hospital for a time while the drugs are in your bladder.  The doctor may take the catheter out right away or it may be left in place to help drain out the fluid  and then be taken out afterwards.  If the doctor takes the catheter out, you will sit on the toilet and pass urine so the urine does not splash when you are allowed to get rid of the fluids and drugs from your bladder.  What care is needed at home?   Ask your doctor what you need to do when you go home. Make sure you ask questions if you do not understand everything the doctor says.  Your doctor may ask you to drink extra fluids after your procedure.  What follow-up care is needed?   Your doctor may ask you to come back to the office to check on your progress. Be sure to keep these visits.  You may need to have more bladder instillations. Talk to your doctor about how many treatments you may need.  What problems could happen?   Bladder or belly pain  Passing urine often  Urinary tract or prostate infection  Severe infection throughout the body  Blood in the urine  Burning feeling in bladder  Feeling tired  Fever  Where can I learn more?   American Cancer Society  https://www.cancer.org/cancer/bladder-cancer/treating/intravesical-therapy.html   [place] [employer]  https://www.baus.org.uk/_userfiles/pages/files/Patients/Leaflets/Painful%20bladder.pdf   Last Reviewed Date   2021-03-18  Consumer Information Use and Disclaimer   This information is not specific medical advice and does not replace information you receive from your health care provider. This is only a brief summary of general information. It does NOT include all information about conditions, illnesses, injuries, tests, procedures, treatments, therapies, discharge instructions or life-style choices that may apply to you. You must talk with your health care provider for complete information about your health and treatment options. This information should not be used to decide whether or not to accept your health care providers advice, instructions or recommendations. Only your health care provider has the knowledge and training to  provide advice that is right for you.  Copyright   Copyright © 2021 UpToDate, Inc. and its affiliates and/or licensors. All rights reserved.

## 2024-01-19 NOTE — PROGRESS NOTES
Bladder instillation #1 administered with sterile technique via 15 Fr. Alexandra , Large amount of clear odorless urine drained from bladder. Procedure tolerated well RTC for weekly instillation

## 2024-01-26 ENCOUNTER — CLINICAL SUPPORT (OUTPATIENT)
Dept: UROLOGY | Facility: CLINIC | Age: 64
End: 2024-01-26
Payer: MEDICAID

## 2024-01-26 DIAGNOSIS — N30.10 INTERSTITIAL CYSTITIS: Primary | ICD-10-CM

## 2024-01-26 LAB
BILIRUB SERPL-MCNC: NEGATIVE MG/DL
BLOOD URINE, POC: NEGATIVE
COLOR, POC UA: YELLOW
GLUCOSE UR QL STRIP: NEGATIVE
KETONES UR QL STRIP: NEGATIVE
LEUKOCYTE ESTERASE URINE, POC: NEGATIVE
NITRITE, POC UA: NEGATIVE
PH, POC UA: 6.5
PROTEIN, POC: NEGATIVE
SPECIFIC GRAVITY, POC UA: 1.02
UROBILINOGEN, POC UA: 0.2

## 2024-01-26 PROCEDURE — 51700 IRRIGATION OF BLADDER: CPT | Mod: S$PBB,,, | Performed by: UROLOGY

## 2024-01-26 PROCEDURE — 51700 IRRIGATION OF BLADDER: CPT | Mod: PBBFAC | Performed by: UROLOGY

## 2024-01-26 PROCEDURE — 96372 THER/PROPH/DIAG INJ SC/IM: CPT | Mod: PBBFAC

## 2024-01-26 PROCEDURE — 99212 OFFICE O/P EST SF 10 MIN: CPT | Mod: PBBFAC,25

## 2024-01-26 PROCEDURE — 81001 URINALYSIS AUTO W/SCOPE: CPT | Mod: PBBFAC

## 2024-01-26 RX ORDER — HEPARIN SODIUM 5000 [USP'U]/ML
40000 INJECTION, SOLUTION INTRAVENOUS; SUBCUTANEOUS
Status: COMPLETED | OUTPATIENT
Start: 2024-01-26 | End: 2024-01-26

## 2024-01-26 RX ORDER — LIDOCAINE HYDROCHLORIDE 20 MG/ML
5 INJECTION, SOLUTION INFILTRATION; PERINEURAL
Status: COMPLETED | OUTPATIENT
Start: 2024-01-26 | End: 2024-01-26

## 2024-01-26 RX ORDER — DEXAMETHASONE SODIUM PHOSPHATE 100 MG/10ML
100 INJECTION INTRAMUSCULAR; INTRAVENOUS
Status: COMPLETED | OUTPATIENT
Start: 2024-01-26 | End: 2024-01-26

## 2024-01-26 RX ORDER — SODIUM BICARBONATE 42 MG/ML
5 INJECTION, SOLUTION INTRAVENOUS
Status: COMPLETED | OUTPATIENT
Start: 2024-01-26 | End: 2024-01-26

## 2024-01-26 RX ADMIN — HEPARIN SODIUM 40000 UNITS: 5000 INJECTION, SOLUTION INTRAVENOUS; SUBCUTANEOUS at 11:01

## 2024-01-26 RX ADMIN — DEXAMETHASONE SODIUM PHOSPHATE 100 MG: 10 INJECTION, SOLUTION INTRAMUSCULAR; INTRAVENOUS at 11:01

## 2024-01-26 RX ADMIN — SODIUM BICARBONATE 10 ML: 42 INJECTION, SOLUTION INTRAVENOUS at 11:01

## 2024-01-26 RX ADMIN — LIDOCAINE HYDROCHLORIDE 5 ML: 20 INJECTION, SOLUTION INFILTRATION; PERINEURAL at 11:01

## 2024-01-26 NOTE — PROGRESS NOTES
Color, UA Yellow   Spec Grav UA 1.020   pH, UA 6.5   WBC, UA Negative   Nitrite, UA Negative   Protein, POC Negative   Glucose, UA Negative   Ketones, UA Negative   Urobilinogen, UA 0.2   Bilirubin, POC Negative   Blood, UA Negative   Microscopic urinalysis revealed negative RBCs, WBCs, nitrites.

## 2024-01-26 NOTE — PROGRESS NOTES
"Bladder instillation #1 held for two hrs and "symptoms seem to be decreasing "stated by patient . Bladder instillation # 2administered with sterile technique via 15 Fr. Alexandra , small amount of clear odorless urine drained from bladder. Procedure tolerated well RTC for weekly instillation . Noticeable possible fissure in perineum area    "

## 2024-01-30 ENCOUNTER — OFFICE VISIT (OUTPATIENT)
Dept: OPHTHALMOLOGY | Facility: CLINIC | Age: 64
End: 2024-01-30
Payer: MEDICAID

## 2024-01-30 VITALS — BODY MASS INDEX: 20.09 KG/M2 | HEIGHT: 66 IN | WEIGHT: 125 LBS

## 2024-01-30 DIAGNOSIS — H01.00B BLEPHARITIS OF UPPER AND LOWER EYELIDS OF BOTH EYES, UNSPECIFIED TYPE: ICD-10-CM

## 2024-01-30 DIAGNOSIS — H01.00A BLEPHARITIS OF UPPER AND LOWER EYELIDS OF BOTH EYES, UNSPECIFIED TYPE: ICD-10-CM

## 2024-01-30 DIAGNOSIS — H02.889 MEIBOMIAN GLAND DISEASE, UNSPECIFIED LATERALITY: ICD-10-CM

## 2024-01-30 DIAGNOSIS — H04.129 DRY EYE: Primary | ICD-10-CM

## 2024-01-30 PROCEDURE — 99214 OFFICE O/P EST MOD 30 MIN: CPT | Mod: PBBFAC,PN | Performed by: STUDENT IN AN ORGANIZED HEALTH CARE EDUCATION/TRAINING PROGRAM

## 2024-01-30 RX ORDER — CYCLOSPORINE 0.5 MG/ML
1 EMULSION OPHTHALMIC 2 TIMES DAILY
Qty: 60 EACH | Refills: 4 | Status: SHIPPED | OUTPATIENT
Start: 2024-01-30 | End: 2025-01-29

## 2024-01-30 NOTE — PROGRESS NOTES
HPI    3months K check  Had a chalazion on KAREEM a few weeks ago and drained   Using OTC tears and Zaditor        Last edited by Fariha Talbot MA on 1/30/2024 10:46 AM.            Assessment /Plan     For exam results, see Encounter Report.    Dry eye    Blepharitis of upper and lower eyelids of both eyes, unspecified type    Meibomian gland disease, unspecified laterality    Other orders  -     cycloSPORINE (RESTASIS) 0.05 % ophthalmic emulsion; Place 1 drop into both eyes 2 (two) times daily.  Dispense: 60 each; Refill: 4                Assessment /Plan     OCT mac 9/5/23  252//253: normal FC OU no SRF or IRF. WNL    OCT RNFL 9/5/23  84//85: All green OU     1. Meibomian gland dysfunction (MGD) of both eyes with mild blepharitis  2. Allergic conjunctivitis of both eyes  - Schirmer's without anesthesia 10mm // 6mm at previous visits again on 9/29/15: 9mm // 8mm  - LS/WC - blepharitis sheet given   - Chronic history of seasonal allergies, c/o itching/burning of eyes  - using .6mm punctal plugs but pt unsure if helped last time 10/2023  - will start restasis bid 1/30/24  - increased pfats to 4-6x daily(using tid)   - reassured pt that stye is resolving KAREEM, some lash loss but pt likely pulled out with aggressive washing, no chalazion or remnant stye seen on exam. Cont wcs, no rubbing of lids  - 3 month k/lid check     3. History bilateral nongranulomatous uveitis  - Lab NDIAYE neg in past (neg RPR, ACE, HLA B27, CBC, EVELYN, CXR)  - Pt with Raynauds  - Saw rheumatology; per patient testing has been wnl and has yearly f/u with rheum  - Reports hx of multiple episodes of zoster infections. Could be underlying cause. Consider lab testing if uveitis reoccurs  - Quiet off of PF since July 2015. Continue to monitor off of gtts     4. Flick xt OS  - intermittent diplopia; notes usually vertical; sometimes horizontal   - ortho primary  - ctm    5. physiologic anisocoria  - seen prior to dilation 6/30/22; minimal asymmetry <1mm;  equal dark and light  - no RAPD  - EOM full  - Longstanding    RTC 3 months K/lid check

## 2024-02-02 ENCOUNTER — CLINICAL SUPPORT (OUTPATIENT)
Dept: UROLOGY | Facility: CLINIC | Age: 64
End: 2024-02-02
Payer: MEDICAID

## 2024-02-02 DIAGNOSIS — N30.10 INTERSTITIAL CYSTITIS: Primary | ICD-10-CM

## 2024-02-02 PROCEDURE — 51700 IRRIGATION OF BLADDER: CPT | Mod: S$PBB,,, | Performed by: NURSE PRACTITIONER

## 2024-02-02 PROCEDURE — 99211 OFF/OP EST MAY X REQ PHY/QHP: CPT | Mod: PBBFAC,25

## 2024-02-02 PROCEDURE — 96372 THER/PROPH/DIAG INJ SC/IM: CPT | Mod: PBBFAC

## 2024-02-02 PROCEDURE — 51700 IRRIGATION OF BLADDER: CPT | Mod: PBBFAC | Performed by: NURSE PRACTITIONER

## 2024-02-02 RX ORDER — LIDOCAINE HYDROCHLORIDE 20 MG/ML
5 INJECTION, SOLUTION INFILTRATION; PERINEURAL
Status: COMPLETED | OUTPATIENT
Start: 2024-02-02 | End: 2024-02-02

## 2024-02-02 RX ORDER — SODIUM BICARBONATE 42 MG/ML
5 INJECTION, SOLUTION INTRAVENOUS
Status: COMPLETED | OUTPATIENT
Start: 2024-02-02 | End: 2024-02-02

## 2024-02-02 RX ORDER — DEXAMETHASONE SODIUM PHOSPHATE 100 MG/10ML
100 INJECTION INTRAMUSCULAR; INTRAVENOUS
Status: COMPLETED | OUTPATIENT
Start: 2024-02-02 | End: 2024-02-02

## 2024-02-02 RX ORDER — HEPARIN SODIUM 5000 [USP'U]/ML
40000 INJECTION, SOLUTION INTRAVENOUS; SUBCUTANEOUS
Status: COMPLETED | OUTPATIENT
Start: 2024-02-02 | End: 2024-02-02

## 2024-02-02 RX ADMIN — HEPARIN SODIUM 40000 UNITS: 5000 INJECTION, SOLUTION INTRAVENOUS; SUBCUTANEOUS at 11:02

## 2024-02-02 RX ADMIN — LIDOCAINE HYDROCHLORIDE 5 ML: 20 INJECTION, SOLUTION INFILTRATION; PERINEURAL at 11:02

## 2024-02-02 RX ADMIN — SODIUM BICARBONATE 10 ML: 42 INJECTION, SOLUTION INTRAVENOUS at 11:02

## 2024-02-02 RX ADMIN — DEXAMETHASONE SODIUM PHOSPHATE 100 MG: 10 INJECTION, SOLUTION INTRAMUSCULAR; INTRAVENOUS at 11:02

## 2024-02-02 NOTE — PROGRESS NOTES
Bladder Instillation # 3 today. Held medication in bladder last week for 2 hours. Has not noticed a significant change as of yet. 15 FR in and out catheter inserted using aseptic technique. Obtained 100 ml of clear urine. Bladder  Medication instilled and tolerated well. Will retrun in one week.

## 2024-02-07 ENCOUNTER — OFFICE VISIT (OUTPATIENT)
Dept: ENDOCRINOLOGY | Facility: CLINIC | Age: 64
End: 2024-02-07
Payer: MEDICAID

## 2024-02-07 VITALS
TEMPERATURE: 98 F | SYSTOLIC BLOOD PRESSURE: 138 MMHG | RESPIRATION RATE: 16 BRPM | OXYGEN SATURATION: 100 % | HEIGHT: 66 IN | BODY MASS INDEX: 20.49 KG/M2 | DIASTOLIC BLOOD PRESSURE: 84 MMHG | HEART RATE: 72 BPM | WEIGHT: 127.5 LBS

## 2024-02-07 DIAGNOSIS — E04.2 MULTINODULAR GOITER: Primary | ICD-10-CM

## 2024-02-07 DIAGNOSIS — M81.0 OSTEOPOROSIS, UNSPECIFIED OSTEOPOROSIS TYPE, UNSPECIFIED PATHOLOGICAL FRACTURE PRESENCE: ICD-10-CM

## 2024-02-07 DIAGNOSIS — E21.3 HYPERPARATHYROIDISM: ICD-10-CM

## 2024-02-07 LAB
ALBUMIN SERPL-MCNC: 4.2 G/DL (ref 3.4–4.8)
BUN SERPL-MCNC: 7.4 MG/DL (ref 9.8–20.1)
CALCIUM SERPL-MCNC: 9.4 MG/DL (ref 8.4–10.2)
CHLORIDE SERPL-SCNC: 103 MMOL/L (ref 98–107)
CO2 SERPL-SCNC: 27 MMOL/L (ref 23–31)
CREAT SERPL-MCNC: 0.67 MG/DL (ref 0.55–1.02)
DEPRECATED CALCIDIOL+CALCIFEROL SERPL-MC: 44.2 NG/ML (ref 30–80)
GFR SERPLBLD CREATININE-BSD FMLA CKD-EPI: >60 MLS/MIN/1.73/M2
GLUCOSE SERPL-MCNC: 90 MG/DL (ref 82–115)
MAGNESIUM SERPL-MCNC: 2.1 MG/DL (ref 1.6–2.6)
PHOSPHATE SERPL-MCNC: 2.8 MG/DL (ref 2.3–4.7)
POTASSIUM SERPL-SCNC: 4.2 MMOL/L (ref 3.5–5.1)
PTH-INTACT SERPL-MCNC: 152.8 PG/ML (ref 8.7–77)
SODIUM SERPL-SCNC: 137 MMOL/L (ref 136–145)

## 2024-02-07 PROCEDURE — 80069 RENAL FUNCTION PANEL: CPT | Performed by: NURSE PRACTITIONER

## 2024-02-07 PROCEDURE — 83735 ASSAY OF MAGNESIUM: CPT | Performed by: NURSE PRACTITIONER

## 2024-02-07 PROCEDURE — 36415 COLL VENOUS BLD VENIPUNCTURE: CPT | Performed by: NURSE PRACTITIONER

## 2024-02-07 PROCEDURE — 1159F MED LIST DOCD IN RCRD: CPT | Mod: CPTII,,, | Performed by: NURSE PRACTITIONER

## 2024-02-07 PROCEDURE — 1160F RVW MEDS BY RX/DR IN RCRD: CPT | Mod: CPTII,,, | Performed by: NURSE PRACTITIONER

## 2024-02-07 PROCEDURE — 3079F DIAST BP 80-89 MM HG: CPT | Mod: CPTII,,, | Performed by: NURSE PRACTITIONER

## 2024-02-07 PROCEDURE — 82306 VITAMIN D 25 HYDROXY: CPT | Performed by: NURSE PRACTITIONER

## 2024-02-07 PROCEDURE — 3075F SYST BP GE 130 - 139MM HG: CPT | Mod: CPTII,,, | Performed by: NURSE PRACTITIONER

## 2024-02-07 PROCEDURE — 3008F BODY MASS INDEX DOCD: CPT | Mod: CPTII,,, | Performed by: NURSE PRACTITIONER

## 2024-02-07 PROCEDURE — 99214 OFFICE O/P EST MOD 30 MIN: CPT | Mod: S$PBB,,, | Performed by: NURSE PRACTITIONER

## 2024-02-07 PROCEDURE — 83970 ASSAY OF PARATHORMONE: CPT | Performed by: NURSE PRACTITIONER

## 2024-02-07 PROCEDURE — 99215 OFFICE O/P EST HI 40 MIN: CPT | Mod: PBBFAC | Performed by: NURSE PRACTITIONER

## 2024-02-07 RX ORDER — OXYCODONE AND ACETAMINOPHEN 10; 325 MG/1; MG/1
TABLET ORAL EVERY 6 HOURS PRN
COMMUNITY
Start: 2024-01-12

## 2024-02-07 RX ORDER — CETIRIZINE HYDROCHLORIDE 10 MG/1
10 TABLET ORAL DAILY
COMMUNITY
Start: 2024-01-12

## 2024-02-07 RX ORDER — TRIAMCINOLONE ACETONIDE 1 MG/G
0.12 CREAM TOPICAL 2 TIMES DAILY
COMMUNITY
Start: 2024-01-12

## 2024-02-07 NOTE — PROGRESS NOTES
Subjective     Patient ID: Araceli Cosby is a 64 y.o. female.    Chief Complaint: Goiter    Endocrine clinic note 02/15/2023: 63 year old female her today for Endocrine clinic F/U for nontoxic multinodular goiter, and osteoporosis.  Patient had ultrasound on 09/16/2022 There is a cystic lesion seen in the right thyroid in the upper pole that measures 3 mm x 3 mm.  There is a mix attenuation nodule in the midpole that measures 4 mm x 4 mm.  There is another cystic area seen in the lower pole that measures 4 mm by 4 mm.  There are multiple areas of nodularity seen in the left thyroid.  Largest nodule measures 6 mm x 5 mm and is seen in the midpole.  Nodules do not meet size for criteria repeat 2 year.  Follow-up ultrasound  no nodules are palpated on exam today.  Previous thyroid functions within normal limits TSH 0.5092, free T4 1.35, free T3 2.98.  Osteoporosis patient had previous T11 fracture.  Note to chart indicates the patient has had multiple lumbar and pelvic fractures.  Discussed patient starting Prolia patient is currently undergoing dental work will complete her dental work and will research on Prolia to make an informed decision on starting.     Endocrine clinic note 08/04/2023:  63-year-old female scheduled today for endocrine clinic follow-up history of nontoxic multinodular goiter and osteoporosis. Multinodular goiter Ultrasound 09/21/2022, TSH 0.5092, free T4 1.35, free T3 2.98. Largest nodule 6 mm x 5 mm seen in midpole  Multi nodules none meeting criteria for FNA/ I will repeat follow-up ultrasound 2 years.  Hyperparathyroidism Previous Calcium level 10.5  Symptomatic patient reports she has tingling around her mouth and also at times feet. Ultrasound 09/21/2022 no parathyroid adenoma seen  NM parathyroid 02/16/2023 no adenoma seen.  Previously CT 4D parathyroid not ran due to patient has iodine allergy listed as a high reactive radiology not comfortable was completing CT at that time.   Osteoporosis patient is seen specialist from [facility] she was supposed to be started on Evenity and had not received orders to go infusion clinic to start.  She states she was wanting to see a new dentist and has been x-rays 1st and she has a fractured tooth to correct before.  Instruct her she is to contact his doctor for orders to start her medication.  Patient has multiple fractures has not had any fractures since her previous visit.     Endocrine clinic note 02/07/2024:  64-year-old female scheduled today for endocrine clinic follow-up.  History of nontoxic multinodular goiter, hyperparathyroidism and osteoporosis. Multinodular goiter Ultrasound 09/21/2022  Largest nodule 6 mm x 5 mm seen in midpole  Multi nodules none meeting criteria for FNA/ I will repeat follow-up ultrasound 2 years. Hyperparathyroidism Previous Calcium level 9.8 on 08/04/2023, asymptomatic, Ultrasound 09/21/2022 no parathyroid adenoma seen, NM parathyroid 02/16/2023 no adenoma seen. .1 on 08/04/2023.  Recent calcium level normal we will repeat labs today to ensure remains normal.  Osteoporosis patient was seen previously and discuss that her PCP we will be treating her osteoporosis.       Result Note  Details        Reading Physician      Reading Date  Result Priority  Angelo Carver MD  [phone]  2/23/2023        Routine     Narrative & Impression  EXAMINATION:  NM PARATHYROID SCAN PLANAR     CLINICAL HISTORY:  Hyperparathyroidism, unspecified     TECHNIQUE:  Following injection of 25.9 99mTc sestamibi and approximately 10 minute delay, anterior projection images of the neck and chest were obtained. After a two-hour delay, repeat anterior projection images of the neck were acquired.     COMPARISON:  None.     FINDINGS:  There is physiological tracer uptake in the myocardium, salivary glands, and thyroid gland. Delayed images demonstrate near-complete tracer washout from the thyroid.     No focal abnormal persistent uptake  is present on delayed images in the region of the parathyroid glands to suggest parathyroid adenoma.     Impression:     No abnormal uptake to suggest parathyroid adenoma        Electronically signed by: Angelo Carver MD  Date:                                            02/23/2023  Time:                                           14:26           Exam Ended: 02/23/23 11:47 Last Resulted: 02/23/23 14:26                 Result Notes  Details        Reading Physician      Reading Date  Result Priority  Aaron Cardenas MD  [phone]  9/21/2022        Routine     Narrative & Impression  EXAMINATION:  US THYROID     CLINICAL HISTORY:  Nontoxic single thyroid nodule     TECHNIQUE:  Ultrasound of the thyroid and cervical lymph nodes was performed.     COMPARISON:  None.     FINDINGS:  The right thyroid measures 5.1 x 1.4 x 1.6 cm and left thyroid measures 4.5 x 1 x 1.2 cm.  There are multiple nodules seen in both thyroid lobes.  Isthmus measures 1.2 mm.  The echotexture of the thyroid is diffusely heterogeneous bilaterally.  There is a cystic lesion seen in the right thyroid in the upper pole that measures 3 mm x 3 mm.  There is a mix attenuation nodule in the midpole that measures 4 mm x 4 mm.  There is another cystic area seen in the lower pole that measures 4 mm by 4 mm.  There are multiple areas of nodularity seen in the left thyroid.  Largest nodule measures 6 mm x 5 mm and is seen in the midpole.  To both lobes appears normal.     Impression:     Multinodular goiter bilaterally        Electronically signed by: Aaron Cardenas  Date:                                            09/21/2022  Time:                                           15:02              Review of Systems   Constitutional:  Positive for fatigue. Negative for activity change and appetite change.   HENT:  Negative for dental problem, hearing loss, tinnitus, trouble swallowing and goiter.    Eyes:  Negative for photophobia, pain and visual  disturbance.   Respiratory:  Negative for cough, chest tightness and wheezing.    Cardiovascular:  Negative for chest pain, palpitations and leg swelling.   Gastrointestinal:  Negative for abdominal pain, constipation, diarrhea, nausea and reflux.   Endocrine: Negative for cold intolerance, heat intolerance, polydipsia and polyphagia.   Genitourinary:  Negative for difficulty urinating, flank pain, hematuria, hot flashes, menstrual irregularity, menstrual problem, nocturia and urgency.   Musculoskeletal:  Positive for back pain, myalgias and neck pain. Negative for gait problem, joint swelling, leg pain and joint deformity.   Integumentary:  Negative for color change, pallor, rash and breast discharge.   Allergic/Immunologic: Negative for environmental allergies, food allergies and immunocompromised state.   Neurological:  Negative for tremors, seizures, headaches, memory loss and coordination difficulties.   Psychiatric/Behavioral:  Negative for agitation, behavioral problems and sleep disturbance. The patient is not nervous/anxious.           Objective     Physical Exam  Constitutional:       General: She is not in acute distress.     Appearance: Normal appearance. She is not ill-appearing.   HENT:      Head: Normocephalic and atraumatic.      Right Ear: External ear normal.      Left Ear: External ear normal.      Nose: Nose normal. No congestion or rhinorrhea.      Mouth/Throat:      Mouth: Mucous membranes are moist.      Pharynx: Oropharynx is clear. No oropharyngeal exudate.   Eyes:      General:         Right eye: No discharge.         Left eye: No discharge.      Conjunctiva/sclera: Conjunctivae normal.      Pupils: Pupils are equal, round, and reactive to light.   Neck:      Thyroid: No thyroid mass, thyromegaly or thyroid tenderness.   Cardiovascular:      Rate and Rhythm: Normal rate and regular rhythm.      Pulses: Normal pulses.      Heart sounds: Normal heart sounds. No murmur heard.  Pulmonary:       Effort: Pulmonary effort is normal. No respiratory distress.      Breath sounds: Normal breath sounds.   Abdominal:      General: Abdomen is flat. Bowel sounds are normal. There is no distension.      Palpations: Abdomen is soft.      Tenderness: There is no abdominal tenderness.   Musculoskeletal:         General: No swelling or tenderness. Normal range of motion.      Cervical back: Normal range of motion and neck supple. No tenderness.      Right lower leg: No edema.      Left lower leg: No edema.   Feet:      Right foot:      Skin integrity: Skin integrity normal.      Left foot:      Skin integrity: Skin integrity normal.   Lymphadenopathy:      Cervical: No cervical adenopathy.   Skin:     General: Skin is warm and dry.      Coloration: Skin is not jaundiced or pale.   Neurological:      General: No focal deficit present.      Mental Status: She is alert and oriented to person, place, and time. Mental status is at baseline.      Coordination: Coordination normal.      Gait: Gait normal.   Psychiatric:         Mood and Affect: Mood normal.         Behavior: Behavior normal.         Thought Content: Thought content normal.            Assessment and Plan     1. Multinodular goiter  Ultrasound 09/21/2022  TSH 0.595, free T4 1.16 on 08/04/2023   Largest nodule 6 mm x 5 mm seen in midpole  Multi nodules none meeting criteria for FNA  Repeat follow-up ultrasound 2 years  Component Ref Range & Units 6 mo ago  (8/4/23) 1 yr ago  (11/22/22) 1 yr ago  (9/21/22) 6 yr ago  (7/27/17)   TSH 0.350 - 4.940 uIU/mL 0.595 0.5092 R 0.9350 R 0.529 R     omponent Ref Range & Units 6 mo ago  (8/4/23) 1 yr ago  (11/22/22) 1 yr ago  (9/21/22)   Thyroxine Free 0.70 - 1.48 ng/dL 1.16 1.35 0.94       2. Hyperparathyroidism  Previous Calcium level 9.8, .1 on 08/04/2023   Symptomatic  Ultrasound 09/21/2022 no parathyroid adenoma seen  NM parathyroid 02/16/2023 no adenoma seen  Component Ref Range & Units 6 mo ago 11 mo ago 1 yr ago 6  yr ago   Sodium Level 136 - 145 mmol/L 137 138 140 139   Potassium Level 3.5 - 5.1 mmol/L 4.2 4.9 3.4 Low  3.5   Chloride 98 - 107 mmol/L 101 104 102 102   Carbon Dioxide 23 - 31 mmol/L 28 26 26 27.0 R   Glucose Level 82 - 115 mg/dL 93 75 Low  136 High  78 R   Blood Urea Nitrogen 9.8 - 20.1 mg/dL 5.2 Low  6.2 Low  7.7 Low  7.0 R   Creatinine 0.55 - 1.02 mg/dL 0.70 0.63 0.66 0.71   Calcium Level Total 8.4 - 10.2 mg/dL 9.8 10.5 High  10.1 9.1 R            Component Ref Range & Units 6 mo ago 11 mo ago 1 yr ago   Parathyroid Hormone Intact 8.7 - 77.0 pg/mL 154.1 High  110.3 High  112.3 High      3. Osteoporosis, unspecified osteoporosis type, unspecified pathological fracture presence  Had not started Prolia  Wants dental Xray prior   Making appt. W/ dentist   Seen Dr from [place] sent Evenity to infusion clinic pt has not heard from for appt. will F/u            Follow up in about 6 months (around 8/7/2024) for Multinodular Goiter, Osteoporosis, Hyperparathyroidism.    I spent a total of 30 minutes on the day of the visit.  This includes face to face time and non-face to face time preparing to see the patient (eg, review of tests), obtaining and/or reviewing separately obtained history, documenting clinical information in the electronic or other health record, independently interpreting results and communicating results to the patient/family/caregiver, or care coordinator.

## 2024-02-08 ENCOUNTER — TELEPHONE (OUTPATIENT)
Dept: ENDOCRINOLOGY | Facility: CLINIC | Age: 64
End: 2024-02-08
Payer: MEDICAID

## 2024-02-08 NOTE — TELEPHONE ENCOUNTER
Left detailed voice message informing that Parathyroid hormone was elevated and all other labs were in normal range.  Ms. Souza will continue to monitor labs at next appointment.

## 2024-02-08 NOTE — TELEPHONE ENCOUNTER
----- Message from Libby Lance NP sent at 2/7/2024  5:23 PM CST -----  Please instruct the patient that her calcium level remains normal at 9.4 and normal range is 8.4-10.2.  Vitamin-D level is normal at 44.2 and normal range is 30-80.  Her magnesium level is currently normal.  Parathyroid hormone remains elevated I will continue to monitor on her follow-up appointments to ensure that her calcium level remains normal.

## 2024-02-09 ENCOUNTER — CLINICAL SUPPORT (OUTPATIENT)
Dept: UROLOGY | Facility: CLINIC | Age: 64
End: 2024-02-09
Payer: MEDICAID

## 2024-02-09 DIAGNOSIS — N30.10 INTERSTITIAL CYSTITIS: Primary | ICD-10-CM

## 2024-02-09 LAB
BILIRUB SERPL-MCNC: NORMAL MG/DL
BLOOD URINE, POC: NORMAL
COLOR, POC UA: YELLOW
GLUCOSE UR QL STRIP: NORMAL
KETONES UR QL STRIP: NORMAL
LEUKOCYTE ESTERASE URINE, POC: NORMAL
NITRITE, POC UA: NORMAL
PH, POC UA: 7.5
PROTEIN, POC: NORMAL
SPECIFIC GRAVITY, POC UA: 1.01
UROBILINOGEN, POC UA: 0.2

## 2024-02-09 PROCEDURE — 51700 IRRIGATION OF BLADDER: CPT | Mod: PBBFAC | Performed by: UROLOGY

## 2024-02-09 PROCEDURE — 81001 URINALYSIS AUTO W/SCOPE: CPT | Mod: PBBFAC

## 2024-02-09 PROCEDURE — 51700 IRRIGATION OF BLADDER: CPT | Mod: S$PBB,,, | Performed by: UROLOGY

## 2024-02-09 PROCEDURE — 96372 THER/PROPH/DIAG INJ SC/IM: CPT | Mod: 59,PBBFAC

## 2024-02-09 PROCEDURE — 99212 OFFICE O/P EST SF 10 MIN: CPT | Mod: PBBFAC,25

## 2024-02-09 RX ORDER — HEPARIN SODIUM 5000 [USP'U]/ML
40000 INJECTION, SOLUTION INTRAVENOUS; SUBCUTANEOUS
Status: COMPLETED | OUTPATIENT
Start: 2024-02-09 | End: 2024-02-09

## 2024-02-09 RX ORDER — DEXAMETHASONE SODIUM PHOSPHATE 100 MG/10ML
100 INJECTION INTRAMUSCULAR; INTRAVENOUS
Status: COMPLETED | OUTPATIENT
Start: 2024-02-09 | End: 2024-02-09

## 2024-02-09 RX ORDER — LIDOCAINE HYDROCHLORIDE 20 MG/ML
5 INJECTION, SOLUTION INFILTRATION; PERINEURAL
Status: COMPLETED | OUTPATIENT
Start: 2024-02-09 | End: 2024-02-09

## 2024-02-09 RX ORDER — SODIUM BICARBONATE 42 MG/ML
5 INJECTION, SOLUTION INTRAVENOUS
Status: COMPLETED | OUTPATIENT
Start: 2024-02-09 | End: 2024-02-09

## 2024-02-09 RX ADMIN — SODIUM BICARBONATE 10 ML: 42 INJECTION, SOLUTION INTRAVENOUS at 11:02

## 2024-02-09 RX ADMIN — DEXAMETHASONE SODIUM PHOSPHATE 100 MG: 10 INJECTION, SOLUTION INTRAMUSCULAR; INTRAVENOUS at 11:02

## 2024-02-09 RX ADMIN — LIDOCAINE HYDROCHLORIDE 5 ML: 20 INJECTION, SOLUTION INFILTRATION; PERINEURAL at 11:02

## 2024-02-09 RX ADMIN — HEPARIN SODIUM 40000 UNITS: 5000 INJECTION, SOLUTION INTRAVENOUS; SUBCUTANEOUS at 11:02

## 2024-02-09 NOTE — PROGRESS NOTES
Presented to clinic without complaints.  Bladder instillation #4 instilled via 15 FR moody catheter using sterile technique.  Tolerated well.  RTC 1 week as scheduled.  Discharge instructions given.

## 2024-02-09 NOTE — PROGRESS NOTES
Color, UA Yellow   Spec Grav UA 1.015   pH, UA 7.5   WBC, UA trace   Nitrite, UA neg   Protein, POC neg   Glucose, UA neg   Ketones, UA neg   Urobilinogen, UA 0.2   Bilirubin, POC neg   Blood, UA neg                   Microscopic urinalysis negative for RBCs nitrites, trace WBCs.

## 2024-02-15 ENCOUNTER — HOSPITAL ENCOUNTER (OUTPATIENT)
Dept: RADIOLOGY | Facility: HOSPITAL | Age: 64
Discharge: HOME | End: 2024-02-15
Attending: NURSE PRACTITIONER
Payer: MEDICAID

## 2024-02-15 DIAGNOSIS — E04.2 MULTINODULAR GOITER: ICD-10-CM

## 2024-02-15 PROCEDURE — 76536 US EXAM OF HEAD AND NECK: CPT | Mod: TC

## 2024-02-16 ENCOUNTER — CLINICAL SUPPORT (OUTPATIENT)
Dept: UROLOGY | Facility: CLINIC | Age: 64
End: 2024-02-16
Payer: MEDICAID

## 2024-02-16 DIAGNOSIS — N30.10 INTERSTITIAL CYSTITIS: Primary | ICD-10-CM

## 2024-02-16 PROCEDURE — 96372 THER/PROPH/DIAG INJ SC/IM: CPT | Mod: PBBFAC

## 2024-02-16 PROCEDURE — 51700 IRRIGATION OF BLADDER: CPT | Mod: PBBFAC | Performed by: NURSE PRACTITIONER

## 2024-02-16 PROCEDURE — 51700 IRRIGATION OF BLADDER: CPT | Mod: S$PBB,,, | Performed by: NURSE PRACTITIONER

## 2024-02-16 RX ORDER — SODIUM BICARBONATE 42 MG/ML
5 INJECTION, SOLUTION INTRAVENOUS
Status: COMPLETED | OUTPATIENT
Start: 2024-02-16 | End: 2024-02-16

## 2024-02-16 RX ORDER — HEPARIN SODIUM 5000 [USP'U]/ML
40000 INJECTION, SOLUTION INTRAVENOUS; SUBCUTANEOUS
Status: COMPLETED | OUTPATIENT
Start: 2024-02-16 | End: 2024-02-16

## 2024-02-16 RX ORDER — LIDOCAINE HYDROCHLORIDE 20 MG/ML
5 INJECTION, SOLUTION INFILTRATION; PERINEURAL
Status: COMPLETED | OUTPATIENT
Start: 2024-02-16 | End: 2024-02-16

## 2024-02-16 RX ORDER — DEXAMETHASONE SODIUM PHOSPHATE 100 MG/10ML
100 INJECTION INTRAMUSCULAR; INTRAVENOUS
Status: COMPLETED | OUTPATIENT
Start: 2024-02-16 | End: 2024-02-16

## 2024-02-16 RX ADMIN — SODIUM BICARBONATE 10 ML: 42 INJECTION, SOLUTION INTRAVENOUS at 10:02

## 2024-02-16 RX ADMIN — DEXAMETHASONE SODIUM PHOSPHATE 100 MG: 10 INJECTION, SOLUTION INTRAMUSCULAR; INTRAVENOUS at 10:02

## 2024-02-16 RX ADMIN — LIDOCAINE HYDROCHLORIDE 5 ML: 20 INJECTION, SOLUTION INFILTRATION; PERINEURAL at 10:02

## 2024-02-16 RX ADMIN — HEPARIN SODIUM 40000 UNITS: 5000 INJECTION, SOLUTION INTRAVENOUS; SUBCUTANEOUS at 10:02

## 2024-02-16 NOTE — PROGRESS NOTES
"Bladder instillation #4 held for two hrs and "symptoms seem to be decreasing "stated by patient . Bladder instillation #5 administered with sterile technique via 15 Fr. Alexandra , Large amount of clear odorless urine drained from bladder. Procedure tolerated well RTC for weekly instillation.      "

## 2024-02-19 ENCOUNTER — TELEPHONE (OUTPATIENT)
Dept: ENDOCRINOLOGY | Facility: CLINIC | Age: 64
End: 2024-02-19
Payer: MEDICAID

## 2024-02-19 NOTE — TELEPHONE ENCOUNTER
----- Message from Libby Lance NP sent at 2/16/2024  1:35 PM CST -----  Please instruct the patient on her thyroid ultrasound it showed small bilateral thyroid nodules that do not meet criteria for biopsy.  Patient had 1 nodule in the left lobe gland that could represent a parathyroid adenoma.  Please instruct her this time we will continue to monitor her calcium levels since it is normal if calcium level elevates I will continue workup for intervention.

## 2024-02-19 NOTE — TELEPHONE ENCOUNTER
I called pt and discussed the following results and recommendations  Please instruct the patient on her thyroid ultrasound it showed small bilateral thyroid nodules that do not meet criteria for biopsy.  Patient had 1 nodule in the left lobe gland that could represent a parathyroid adenoma.  Please instruct her this time we will continue to monitor her calcium levels since it is normal if calcium level elevates I will continue workup for intervention.    Pt verbalizes understanding of above results and recommendations

## 2024-02-23 ENCOUNTER — CLINICAL SUPPORT (OUTPATIENT)
Dept: UROLOGY | Facility: CLINIC | Age: 64
End: 2024-02-23
Payer: MEDICAID

## 2024-02-23 DIAGNOSIS — N30.10 INTERSTITIAL CYSTITIS: Primary | ICD-10-CM

## 2024-02-23 LAB
BILIRUB SERPL-MCNC: NEGATIVE MG/DL
BLOOD URINE, POC: NEGATIVE
COLOR, POC UA: YELLOW
GLUCOSE UR QL STRIP: NEGATIVE
KETONES UR QL STRIP: NEGATIVE
LEUKOCYTE ESTERASE URINE, POC: NEGATIVE
NITRITE, POC UA: NEGATIVE
PH, POC UA: 7.5
PROTEIN, POC: NEGATIVE
SPECIFIC GRAVITY, POC UA: 1.01
UROBILINOGEN, POC UA: 0.2

## 2024-02-23 PROCEDURE — 81001 URINALYSIS AUTO W/SCOPE: CPT | Mod: PBBFAC

## 2024-02-23 PROCEDURE — 96372 THER/PROPH/DIAG INJ SC/IM: CPT | Mod: PBBFAC,59

## 2024-02-23 PROCEDURE — 51700 IRRIGATION OF BLADDER: CPT | Mod: S$PBB,,, | Performed by: UROLOGY

## 2024-02-23 PROCEDURE — 99212 OFFICE O/P EST SF 10 MIN: CPT | Mod: PBBFAC

## 2024-02-23 PROCEDURE — 51700 IRRIGATION OF BLADDER: CPT | Mod: PBBFAC | Performed by: UROLOGY

## 2024-02-23 RX ORDER — LIDOCAINE HYDROCHLORIDE 20 MG/ML
5 INJECTION, SOLUTION INFILTRATION; PERINEURAL
Status: COMPLETED | OUTPATIENT
Start: 2024-02-23 | End: 2024-02-23

## 2024-02-23 RX ORDER — DEXAMETHASONE SODIUM PHOSPHATE 100 MG/10ML
100 INJECTION INTRAMUSCULAR; INTRAVENOUS
Status: COMPLETED | OUTPATIENT
Start: 2024-02-23 | End: 2024-02-23

## 2024-02-23 RX ORDER — HEPARIN SODIUM 5000 [USP'U]/ML
40000 INJECTION, SOLUTION INTRAVENOUS; SUBCUTANEOUS
Status: COMPLETED | OUTPATIENT
Start: 2024-02-23 | End: 2024-02-23

## 2024-02-23 RX ORDER — SODIUM BICARBONATE 42 MG/ML
5 INJECTION, SOLUTION INTRAVENOUS
Status: COMPLETED | OUTPATIENT
Start: 2024-02-23 | End: 2024-02-23

## 2024-02-23 RX ADMIN — LIDOCAINE HYDROCHLORIDE 5 ML: 20 INJECTION, SOLUTION INFILTRATION; PERINEURAL at 10:02

## 2024-02-23 RX ADMIN — DEXAMETHASONE SODIUM PHOSPHATE 100 MG: 10 INJECTION, SOLUTION INTRAMUSCULAR; INTRAVENOUS at 10:02

## 2024-02-23 RX ADMIN — SODIUM BICARBONATE 10 ML: 42 INJECTION, SOLUTION INTRAVENOUS at 10:02

## 2024-02-23 RX ADMIN — HEPARIN SODIUM 40000 UNITS: 5000 INJECTION, SOLUTION INTRAVENOUS; SUBCUTANEOUS at 10:02

## 2024-02-23 NOTE — PROGRESS NOTES
Patient here for Bladder instillation #6.  Was able to hold medication for 2 hours last week.  Using sterile technique inserted catheter, drained bladder and instilled medication without difficulty.  Patient tolerated well.  RTC in 1 week for #7.        Component 10:39   Color, UA Yellow   Spec Grav UA 1.015   pH, UA 7.5   WBC, UA Negative   Nitrite, UA Negative   Protein, POC Negative   Glucose, UA Negative   Ketones, UA Negative   Urobilinogen, UA 0.2   Bilirubin, POC Negative   Blood, UA Negative      Microscopic urinalysis revealed negative RBCs, WBCs,

## 2024-02-23 NOTE — PATIENT INSTRUCTIONS
Bladder Instillation   Why is this procedure done?   Bladder instillation therapy is when a liquid drug is used to coat the inside of your bladder. Your doctor may order this procedure to:  Treat bladder pain or interstitial cystitis  Treat recurrent bladder infections  Prevent bladder stones  What happens before the procedure?   Your doctor will ask about your health history. Talk to the doctor about:  All the drugs you are taking. Be sure to include all prescription, over the counter, vitamins, and herbal supplements. Bring a list of drugs you take with you. Tell the doctor if you have any drug allergy.  If you take a diuretic, ask the doctor if you should take this drug the morning of your treatment.  If you think you may have a bladder infection, if you have had any fevers, or have had any recent blood in your urine  Any surgeries you have had on your bladder, kidneys, urethra, or ureters  If you need to limit fluids or avoid caffeine before the procedure  The doctor may order tests and check your urine for blood or infection.  What happens during the procedure?   The staff will place a thin, flexible tube through your urethra into your bladder. This is a catheter. The catheter will drain any urine from your bladder.  The doctor will place a mixture of fluid and drugs through the catheter into your bladder. The doctor may heat the fluid before giving it to you.  You will hold the fluid and drugs inside your bladder for a time. Talk with your doctor to find out how long you need to hold the drugs in your bladder to help them work. This could be anywhere from a few minutes to a few hours. Ask your doctor if you need to change positions while the fluid is in your bladder.  What happens after the procedure?   You may have to stay in the doctor's office or hospital for a time while the drugs are in your bladder.  The doctor may take the catheter out right away or it may be left in place to help drain out the fluid  and then be taken out afterwards.  If the doctor takes the catheter out, you will sit on the toilet and pass urine so the urine does not splash when you are allowed to get rid of the fluids and drugs from your bladder.  What care is needed at home?   Ask your doctor what you need to do when you go home. Make sure you ask questions if you do not understand everything the doctor says.  Your doctor may ask you to drink extra fluids after your procedure.  What follow-up care is needed?   Your doctor may ask you to come back to the office to check on your progress. Be sure to keep these visits.  You may need to have more bladder instillations. Talk to your doctor about how many treatments you may need.  What problems could happen?   Bladder or belly pain  Passing urine often  Urinary tract or prostate infection  Severe infection throughout the body  Blood in the urine  Burning feeling in bladder  Feeling tired  Fever  Where can I learn more?   American [employer] Society  https://www.cancer.org/cancer/bladder-cancer/treating/intravesical-therapy.html   [place] [employer]  https://www.baus.org.uk/_userfiles/pages/files/Patients/Leaflets/Painful%20bladder.pdf   Last Reviewed Date   2021-03-18  Consumer Information Use and Disclaimer   This information is not specific medical advice and does not replace information you receive from your health care provider. This is only a brief summary of general information. It does NOT include all information about conditions, illnesses, injuries, tests, procedures, treatments, therapies, discharge instructions or life-style choices that may apply to you. You must talk with your health care provider for complete information about your health and treatment options. This information should not be used to decide whether or not to accept your health care providers advice, instructions or recommendations. Only your health care provider has the knowledge and training to  provide advice that is right for you.  Copyright   Copyright © 2021 [employer] and its affiliates and/or licensors. All rights reserved.

## 2024-03-01 ENCOUNTER — HOSPITAL ENCOUNTER (OUTPATIENT)
Dept: RADIOLOGY | Facility: HOSPITAL | Age: 64
Discharge: HOME | End: 2024-03-01
Attending: UROLOGY
Payer: MEDICAID

## 2024-03-01 ENCOUNTER — CLINICAL SUPPORT (OUTPATIENT)
Dept: UROLOGY | Facility: CLINIC | Age: 64
End: 2024-03-01
Attending: UROLOGY
Payer: MEDICAID

## 2024-03-01 DIAGNOSIS — E21.3 HYPERPARATHYROIDISM: ICD-10-CM

## 2024-03-01 DIAGNOSIS — N30.10 INTERSTITIAL CYSTITIS: Primary | ICD-10-CM

## 2024-03-01 PROCEDURE — 51700 IRRIGATION OF BLADDER: CPT | Mod: PBBFAC | Performed by: NURSE PRACTITIONER

## 2024-03-01 PROCEDURE — 96372 THER/PROPH/DIAG INJ SC/IM: CPT | Mod: PBBFAC

## 2024-03-01 PROCEDURE — 51700 IRRIGATION OF BLADDER: CPT | Mod: S$PBB,,, | Performed by: NURSE PRACTITIONER

## 2024-03-01 PROCEDURE — 99211 OFF/OP EST MAY X REQ PHY/QHP: CPT | Mod: PBBFAC,25

## 2024-03-01 PROCEDURE — 74176 CT ABD & PELVIS W/O CONTRAST: CPT | Mod: TC

## 2024-03-01 RX ORDER — LIDOCAINE HYDROCHLORIDE 20 MG/ML
5 INJECTION, SOLUTION INFILTRATION; PERINEURAL
Status: COMPLETED | OUTPATIENT
Start: 2024-03-01 | End: 2024-03-01

## 2024-03-01 RX ORDER — DEXAMETHASONE SODIUM PHOSPHATE 100 MG/10ML
100 INJECTION INTRAMUSCULAR; INTRAVENOUS
Status: COMPLETED | OUTPATIENT
Start: 2024-03-01 | End: 2024-03-01

## 2024-03-01 RX ORDER — HEPARIN SODIUM 5000 [USP'U]/ML
40000 INJECTION, SOLUTION INTRAVENOUS; SUBCUTANEOUS
Status: COMPLETED | OUTPATIENT
Start: 2024-03-01 | End: 2024-03-01

## 2024-03-01 RX ORDER — SODIUM BICARBONATE 42 MG/ML
5 INJECTION, SOLUTION INTRAVENOUS
Status: COMPLETED | OUTPATIENT
Start: 2024-03-01 | End: 2024-03-01

## 2024-03-01 RX ADMIN — HEPARIN SODIUM 40000 UNITS: 5000 INJECTION, SOLUTION INTRAVENOUS; SUBCUTANEOUS at 10:03

## 2024-03-01 RX ADMIN — DEXAMETHASONE SODIUM PHOSPHATE 100 MG: 10 INJECTION, SOLUTION INTRAMUSCULAR; INTRAVENOUS at 10:03

## 2024-03-01 RX ADMIN — LIDOCAINE HYDROCHLORIDE 5 ML: 20 INJECTION, SOLUTION INFILTRATION; PERINEURAL at 10:03

## 2024-03-01 RX ADMIN — SODIUM BICARBONATE 10 ML: 42 INJECTION, SOLUTION INTRAVENOUS at 10:03

## 2024-03-01 NOTE — PROGRESS NOTES
"Bladder instillation #6 held for two hrs and "symptoms seem to be decreasing "stated by patient . Bladder instillation #7 administered with sterile technique via 15 Fr. Alexandra , Large amount of clear odorless urine drained from bladder. Procedure tolerated well RTC for weekly instillation      "

## 2024-03-08 ENCOUNTER — LAB VISIT (OUTPATIENT)
Dept: LAB | Facility: HOSPITAL | Age: 64
End: 2024-03-08
Attending: STUDENT IN AN ORGANIZED HEALTH CARE EDUCATION/TRAINING PROGRAM
Payer: MEDICAID

## 2024-03-08 ENCOUNTER — PROCEDURE VISIT (OUTPATIENT)
Dept: UROLOGY | Facility: CLINIC | Age: 64
End: 2024-03-08
Payer: MEDICAID

## 2024-03-08 VITALS
SYSTOLIC BLOOD PRESSURE: 138 MMHG | RESPIRATION RATE: 20 BRPM | HEIGHT: 66 IN | OXYGEN SATURATION: 100 % | BODY MASS INDEX: 20.89 KG/M2 | DIASTOLIC BLOOD PRESSURE: 78 MMHG | HEART RATE: 68 BPM | TEMPERATURE: 98 F | WEIGHT: 130 LBS

## 2024-03-08 DIAGNOSIS — K21.9 GASTROESOPHAGEAL REFLUX DISEASE, UNSPECIFIED WHETHER ESOPHAGITIS PRESENT: ICD-10-CM

## 2024-03-08 DIAGNOSIS — E21.3 HYPERPARATHYROIDISM: ICD-10-CM

## 2024-03-08 DIAGNOSIS — J31.0 CHRONIC RHINITIS: ICD-10-CM

## 2024-03-08 DIAGNOSIS — L30.9 ACUTE DERMATITIS: ICD-10-CM

## 2024-03-08 DIAGNOSIS — M79.7 SCAPULOHUMERAL FIBROSITIS: ICD-10-CM

## 2024-03-08 DIAGNOSIS — J32.9 CHRONIC INFECTION OF SINUS: Primary | ICD-10-CM

## 2024-03-08 DIAGNOSIS — N39.41 URGE INCONTINENCE: Primary | ICD-10-CM

## 2024-03-08 DIAGNOSIS — J44.9 VANISHING LUNG: ICD-10-CM

## 2024-03-08 DIAGNOSIS — T78.1XXA ADVERSE FOOD REACTION: ICD-10-CM

## 2024-03-08 DIAGNOSIS — J30.0 VASOMOTOR RHINITIS: ICD-10-CM

## 2024-03-08 DIAGNOSIS — N30.10 INTERSTITIAL CYSTITIS: ICD-10-CM

## 2024-03-08 LAB
BASOPHILS # BLD AUTO: 0.02 X10(3)/MCL
BASOPHILS NFR BLD AUTO: 0.5 %
BILIRUB SERPL-MCNC: NORMAL MG/DL
BLOOD URINE, POC: NORMAL
COLOR, POC UA: YELLOW
EOSINOPHIL # BLD AUTO: 0.03 X10(3)/MCL (ref 0–0.9)
EOSINOPHIL NFR BLD AUTO: 0.7 %
ERYTHROCYTE [DISTWIDTH] IN BLOOD BY AUTOMATED COUNT: 13.4 % (ref 11.5–17)
GLUCOSE UR QL STRIP: NORMAL
HCT VFR BLD AUTO: 37.9 % (ref 37–47)
HGB BLD-MCNC: 12.5 G/DL (ref 12–16)
IGA SERPL-MCNC: 268 MG/DL (ref 69–517)
IGG SERPL-MCNC: 737 MG/DL (ref 522–1631)
IGM SERPL-MCNC: 81 MG/DL (ref 33–293)
IMM GRANULOCYTES # BLD AUTO: 0.01 X10(3)/MCL (ref 0–0.04)
IMM GRANULOCYTES NFR BLD AUTO: 0.2 %
KETONES UR QL STRIP: NORMAL
LEUKOCYTE ESTERASE URINE, POC: NORMAL
LYMPHOCYTES # BLD AUTO: 1.52 X10(3)/MCL (ref 0.6–4.6)
LYMPHOCYTES NFR BLD AUTO: 37.3 %
MCH RBC QN AUTO: 29.7 PG (ref 27–31)
MCHC RBC AUTO-ENTMCNC: 33 G/DL (ref 33–36)
MCV RBC AUTO: 90 FL (ref 80–94)
MONOCYTES # BLD AUTO: 0.34 X10(3)/MCL (ref 0.1–1.3)
MONOCYTES NFR BLD AUTO: 8.3 %
NEUTROPHILS # BLD AUTO: 2.16 X10(3)/MCL (ref 2.1–9.2)
NEUTROPHILS NFR BLD AUTO: 53 %
NITRITE, POC UA: NORMAL
NRBC BLD AUTO-RTO: 0 %
PH, POC UA: 6.5
PLATELET # BLD AUTO: 341 X10(3)/MCL (ref 130–400)
PMV BLD AUTO: 9.2 FL (ref 7.4–10.4)
PROTEIN, POC: NORMAL
RBC # BLD AUTO: 4.21 X10(6)/MCL (ref 4.2–5.4)
SPECIFIC GRAVITY, POC UA: 1.02
UROBILINOGEN, POC UA: 0.2
WBC # SPEC AUTO: 4.08 X10(3)/MCL (ref 4.5–11.5)

## 2024-03-08 PROCEDURE — 52000 CYSTOURETHROSCOPY: CPT | Mod: S$PBB,,, | Performed by: UROLOGY

## 2024-03-08 PROCEDURE — 85025 COMPLETE CBC W/AUTO DIFF WBC: CPT

## 2024-03-08 PROCEDURE — 52000 CYSTOURETHROSCOPY: CPT | Mod: PBBFAC | Performed by: UROLOGY

## 2024-03-08 PROCEDURE — 51700 IRRIGATION OF BLADDER: CPT | Mod: S$PBB,59,, | Performed by: UROLOGY

## 2024-03-08 PROCEDURE — 82787 IGG 1 2 3 OR 4 EACH: CPT

## 2024-03-08 PROCEDURE — 86317 IMMUNOASSAY INFECTIOUS AGENT: CPT

## 2024-03-08 PROCEDURE — 51700 IRRIGATION OF BLADDER: CPT | Mod: PBBFAC | Performed by: UROLOGY

## 2024-03-08 PROCEDURE — 96372 THER/PROPH/DIAG INJ SC/IM: CPT | Mod: PBBFAC

## 2024-03-08 PROCEDURE — 36415 COLL VENOUS BLD VENIPUNCTURE: CPT

## 2024-03-08 PROCEDURE — 81001 URINALYSIS AUTO W/SCOPE: CPT | Mod: PBBFAC | Performed by: UROLOGY

## 2024-03-08 PROCEDURE — 82784 ASSAY IGA/IGD/IGG/IGM EACH: CPT

## 2024-03-08 RX ORDER — LIDOCAINE HYDROCHLORIDE 20 MG/ML
JELLY TOPICAL
Status: COMPLETED | OUTPATIENT
Start: 2024-03-08 | End: 2024-03-08

## 2024-03-08 RX ORDER — LIDOCAINE HYDROCHLORIDE 20 MG/ML
5 INJECTION, SOLUTION INFILTRATION; PERINEURAL
Status: COMPLETED | OUTPATIENT
Start: 2024-03-08 | End: 2024-03-08

## 2024-03-08 RX ORDER — DEXAMETHASONE SODIUM PHOSPHATE 100 MG/10ML
100 INJECTION INTRAMUSCULAR; INTRAVENOUS
Status: COMPLETED | OUTPATIENT
Start: 2024-03-08 | End: 2024-03-08

## 2024-03-08 RX ORDER — HEPARIN SODIUM 5000 [USP'U]/ML
40000 INJECTION, SOLUTION INTRAVENOUS; SUBCUTANEOUS ONCE
Status: COMPLETED | OUTPATIENT
Start: 2024-03-08 | End: 2024-03-08

## 2024-03-08 RX ORDER — SODIUM BICARBONATE 42 MG/ML
10 INJECTION, SOLUTION INTRAVENOUS ONCE
Status: COMPLETED | OUTPATIENT
Start: 2024-03-08 | End: 2024-03-08

## 2024-03-08 RX ADMIN — HEPARIN SODIUM 40000 UNITS: 5000 INJECTION, SOLUTION INTRAVENOUS; SUBCUTANEOUS at 09:03

## 2024-03-08 RX ADMIN — DEXAMETHASONE SODIUM PHOSPHATE 100 MG: 10 INJECTION, SOLUTION INTRAMUSCULAR; INTRAVENOUS at 09:03

## 2024-03-08 RX ADMIN — LIDOCAINE HYDROCHLORIDE: 20 JELLY TOPICAL at 09:03

## 2024-03-08 RX ADMIN — SODIUM BICARBONATE 10 ML: 42 INJECTION, SOLUTION INTRAVENOUS at 09:03

## 2024-03-08 RX ADMIN — LIDOCAINE HYDROCHLORIDE 5 ML: 20 INJECTION, SOLUTION INFILTRATION; PERINEURAL at 09:03

## 2024-03-08 NOTE — PROCEDURES
CC:  Cystoscopy    HPI:  Araceli Cosby is a 64 y.o. female here for cystoscopy for irritative bladder complaints.  She was diagnosed with IC in the late 1980's or early 1990's.  She had DMSO done in 1999 but she had a lot of bladder spasms with that so she stopped taking it.  She tries to follow an IC diet.  Pelvic physical therapy for fractures has helped her in the past.  She has tried Elmiron as well.  She gets the sensation that she has to urinate and then will go and won't be able to.  She doesn't feel like the bladder empties because she feels like the bladder is irritated and swollen.  She has also been having some post void dribbling.    She was recently diagnosed with hyperparthyroidism.  She denies any history of kidney stones.  A CT scan was obtained to make sure that she did not have renal calculi.  She complains of urgency and urge incontinence.  She denies stress urinary incontinence.      Urinalysis:  Results for orders placed or performed in visit on 03/08/24   POCT URINE DIPSTICK WITH MICROSCOPE, AUTOMATED   Result Value Ref Range    Color, UA Yellow     Spec Grav UA 1.020     pH, UA 6.5     WBC, UA neg     Nitrite, UA neg     Protein, POC neg     Glucose, UA neg     Ketones, UA neg     Urobilinogen, UA 0.2     Bilirubin, POC neg     Blood, UA neg        Microscopic Urinalysis:  WBC:   None per HPF     RBC:    None per HPF     Bacteria:    None per HPF     Squamous epithelial cells:    None per HPF  Crystals:   None    Lab Results:    Recent Labs     02/07/24  1500   CREATININE 0.67      Imaging:   CT - 1 March 2024:  There were no stones present and the kidneys are unremarkable.    ROS:  All systems reviewed and are negative except as documented in HPI and/or Assessment and Plan.     Patient Active Problem List:     Patient Active Problem List   Diagnosis    Meibomian gland disease    Blepharitis of upper and lower eyelids of both eyes    Age related osteoporosis    Trigger finger, left index  finger    Carpal tunnel syndrome on left    Interstitial cystitis    Chronic pelvic pain in female    Encounter for pacemaker at end of battery life        Past Medical History:  Past Medical History:   Diagnosis Date    Allergies     Fibromyalgia     GERD (gastroesophageal reflux disease)     Interstitial cystitis     Mitral valve prolapse     Muscle disease     Osteoarthritis     Osteoporosis     Skin cancer         Past Surgical History:  Past Surgical History:   Procedure Laterality Date    APPENDECTOMY      CARPAL TUNNEL RELEASE Right     FINGER SURGERY N/A     left thumb    HYSTERECTOMY      pace maker      REPLACEMENT OF PACEMAKER GENERATOR N/A 10/12/2023    Procedure: REPLACEMENT, PACEMAKER GENERATOR;  Surgeon: Austin Willis MD;  Location: [facility] CATH LAB;  Service: Cardiology;  Laterality: N/A;  PPM GEN CHANGE (BS) MAY NEED TEMP PACING    RIB FRACTURE SURGERY Right     1st rib and extra rib removed        Family History:  Family History   Problem Relation Age of Onset    Arthritis Mother     No Known Problems Father     No Known Problems Sister     No Known Problems Brother     No Known Problems Maternal Aunt     No Known Problems Maternal Uncle     No Known Problems Paternal Aunt     No Known Problems Paternal Uncle     No Known Problems Maternal Grandmother     No Known Problems Maternal Grandfather     No Known Problems Paternal Grandmother     No Known Problems Paternal Grandfather     Aneurysm Neg Hx     Cancer Neg Hx     Clotting disorder Neg Hx     Dementia Neg Hx     Diabetes Neg Hx     Fainting Neg Hx     Heart disease Neg Hx     Hyperlipidemia Neg Hx     Kidney disease Neg Hx     Liver disease Neg Hx     Migraines Neg Hx     Neuropathy Neg Hx     Obesity Neg Hx     Parkinsonism Neg Hx     Seizures Neg Hx     Stroke Neg Hx     Tremor Neg Hx         Social History:  Social History     Socioeconomic History    Marital status: Single   Tobacco Use    Smoking status: Former     Current packs/day: 0.00      Types: Cigarettes     Quit date:      Years since quittin.2     Passive exposure: Past    Smokeless tobacco: Former     Quit date:    Substance and Sexual Activity    Alcohol use: Not Currently    Drug use: Never    Sexual activity: Not Currently        Allergies:  Review of patient's allergies indicates:   Allergen Reactions    Amlodipine Swelling    Cardizem [diltiazem hcl] Swelling    Iodinated contrast media Rash    Metoprolol Shortness Of Breath    Acyclovir analogues Other (See Comments)     Headache    Azithromycin Other (See Comments)     Hard on stomach    Cephalexin Other (See Comments)    Gabapentin      Other reaction(s): GI Upset, Headache    Hydromorphone     Ibuprofen-famotidine      Other reaction(s): GI Upset    Nsaids (non-steroidal anti-inflammatory drug)     Paroxetine Other (See Comments)     Other reaction(s): Cognitive Disruption    Serotonin 5ht-3 antagonists     Tramadol Other (See Comments)     nervousness    Wellbutrin [bupropion hcl] Other (See Comments)     headache    Ciprofloxacin Nausea And Vomiting     Other reaction(s): GI Upset, Headache    Codeine Rash    Cyclobenzaprine Rash    Doxycycline Rash    Iodine and iodide containing products Rash    Paxil [paroxetine hcl] Anxiety    Sulfa (sulfonamide antibiotics) Rash        Objective:  Vitals:    24 0922   BP: 138/78   Pulse: 68   Resp: 20   Temp: 97.9 °F (36.6 °C)     General:  Well developed, well nourished adult female in no acute distress  Abdomen: Soft, nontender, no masses  Extremities:  No clubbing, cyanosis, or edema  Neurologic:  Grossly intact  Musculoskeletal:  Normal tone  :  External genitalia is normal without lesions.  Vagina is normal.      Cystoscopy:         - Urethral meatus:  No strictures        - Urethra:  Normal without strictures or lesions        - Bladder neck:  Normal        - Bladder:  No mucosal abnormalities        - Ureteral orifices:  On the trigone with clear efflux  bilaterally    The patient tolerated the procedure well without complications.  She was given Cipro 500mg, one tablet in the clinic.   The urethra was anesthetized with 2% Lidocaine Jelly, Urojet.      Assessment:  1. Urge incontinence  - POCT URINE DIPSTICK WITH MICROSCOPE, AUTOMATED    2. Interstitial cystitis [N30.10]    3. Hyperparathyroidism     Plan:  She does have extensive lumbar fractures which are old.  This may be contributing to the urge incontinence.  If this becomes a problem we will consider Myrbetriq.  Continue bladder instillations.  She will go to every two weeks and I will see her back in three months.  Bladder instillations was done today.  There is no evidence of stone disease related to her hyperparathyroidism.    Follow-up:    Three months with bladder scan.

## 2024-03-08 NOTE — PROGRESS NOTES
Seen by Dr. Marie.  Consent signed and witnessed.  Time out performed.  Lidocaine urojet applied.  Assisted with procedure.  Tolerated well.  Bladder instillation #8 instilled via 15 FR moody catheter using sterile technique.  Tolerated well.  RTC 2 week for bladder instillation.  RTC 3 months with Dr. Marie.

## 2024-03-11 LAB
IGG SERPL-MCNC: 688 MG/DL (ref 767–1590)
IGG1 SER-MCNC: 327 MG/DL (ref 341–894)
IGG2 SER-MCNC: 273 MG/DL (ref 171–632)
IGG3 SER-MCNC: 22.6 MG/DL (ref 18.4–106)
IGG4 SER-MCNC: 64.3 MG/DL (ref 2.4–121)

## 2024-03-12 DIAGNOSIS — N30.10 INTERSTITIAL CYSTITIS: Primary | ICD-10-CM

## 2024-03-13 LAB
IMMUNOLOGIST REVIEW: NORMAL
S PN DA SERO 19F IGG SER-MCNC: 0.7 MCG/ML
S PNEUM DA 1 IGG SER-MCNC: 0.2 MCG/ML
S PNEUM DA 10A IGG SER-MCNC: 0.4 MCG/ML
S PNEUM DA 11A IGG SER-MCNC: 0.1 MCG/ML
S PNEUM DA 12F IGG SER-MCNC: 0.1 MCG/ML
S PNEUM DA 14 IGG SER-MCNC: 0.3 MCG/ML
S PNEUM DA 15B IGG SER-MCNC: 0.4 MCG/ML
S PNEUM DA 17F IGG SER-MCNC: 0.3 MCG/ML
S PNEUM DA 18C IGG SER-MCNC: 0.5 MCG/ML
S PNEUM DA 19A IGG SER-MCNC: 0.4 MCG/ML
S PNEUM DA 2 IGG SER-MCNC: 0.2 MCG/ML
S PNEUM DA 20A IGG SER-MCNC: 1.5 MCG/ML
S PNEUM DA 22F IGG SER-MCNC: 0.5 MCG/ML
S PNEUM DA 23F IGG SER-MCNC: 0.4 MCG/ML
S PNEUM DA 3 IGG SER-MCNC: 0.1 MCG/ML
S PNEUM DA 33F IGG SER-MCNC: 1.2 MCG/ML
S PNEUM DA 4 IGG SER-MCNC: 0.1 MCG/ML
S PNEUM DA 5 IGG SER-MCNC: 0.1 MCG/ML
S PNEUM DA 6B IGG SER-MCNC: 0.2 MCG/ML
S PNEUM DA 7F IGG SER-MCNC: 0.2 MCG/ML
S PNEUM DA 8 IGG SER-MCNC: 0.4 MCG/ML
S PNEUM DA 9N IGG SER-MCNC: 0.4 MCG/ML
S PNEUM DA 9V IGG SER-MCNC: 0.1 MCG/ML

## 2024-03-14 ENCOUNTER — TELEPHONE (OUTPATIENT)
Dept: UROLOGY | Facility: CLINIC | Age: 64
End: 2024-03-14
Payer: MEDICAID

## 2024-03-14 NOTE — TELEPHONE ENCOUNTER
RE: pt call  Rashel Arguelles LPN sent to Laine Coronado  Caller: Unspecified (Today, 10:07 AM)  Attempted to call patient. LVM for patient to call back   -------------------------------------------------------------------------------------------------------------------------------   Previous Messages     ----- Message -----  From: Laine Coronado  Sent: 3/14/2024  10:10 AM CDT  To: Rashel Arguelles LPN  Subject: pt call                                          Pt had cysto done 3/8/24 and called and left a message for you to call her back. Phone number verified. Thanks

## 2024-03-22 ENCOUNTER — CLINICAL SUPPORT (OUTPATIENT)
Dept: UROLOGY | Facility: CLINIC | Age: 64
End: 2024-03-22
Payer: MEDICAID

## 2024-03-22 DIAGNOSIS — N30.10 INTERSTITIAL CYSTITIS: Primary | ICD-10-CM

## 2024-03-22 DIAGNOSIS — K62.3 RECTAL PROLAPSE: Primary | ICD-10-CM

## 2024-03-22 LAB
BILIRUB SERPL-MCNC: NORMAL MG/DL
BLOOD URINE, POC: NORMAL
COLOR, POC UA: YELLOW
GLUCOSE UR QL STRIP: NORMAL
KETONES UR QL STRIP: NORMAL
LEUKOCYTE ESTERASE URINE, POC: NORMAL
NITRITE, POC UA: NORMAL
PH, POC UA: 7
PROTEIN, POC: NORMAL
SPECIFIC GRAVITY, POC UA: 1.01
UROBILINOGEN, POC UA: 0.2

## 2024-03-22 PROCEDURE — 99211 OFF/OP EST MAY X REQ PHY/QHP: CPT | Mod: PBBFAC,25

## 2024-03-22 PROCEDURE — 51700 IRRIGATION OF BLADDER: CPT | Mod: S$PBB,,, | Performed by: UROLOGY

## 2024-03-22 PROCEDURE — 51700 IRRIGATION OF BLADDER: CPT | Mod: PBBFAC | Performed by: UROLOGY

## 2024-03-22 PROCEDURE — 96372 THER/PROPH/DIAG INJ SC/IM: CPT | Mod: PBBFAC,59

## 2024-03-22 PROCEDURE — 81001 URINALYSIS AUTO W/SCOPE: CPT | Mod: PBBFAC

## 2024-03-22 RX ORDER — HEPARIN SODIUM 5000 [USP'U]/ML
40000 INJECTION, SOLUTION INTRAVENOUS; SUBCUTANEOUS
Status: COMPLETED | OUTPATIENT
Start: 2024-03-22 | End: 2024-03-22

## 2024-03-22 RX ORDER — LIDOCAINE HYDROCHLORIDE 20 MG/ML
5 INJECTION, SOLUTION INFILTRATION; PERINEURAL
Status: COMPLETED | OUTPATIENT
Start: 2024-03-22 | End: 2024-03-22

## 2024-03-22 RX ORDER — DEXAMETHASONE SODIUM PHOSPHATE 100 MG/10ML
100 INJECTION INTRAMUSCULAR; INTRAVENOUS
Status: COMPLETED | OUTPATIENT
Start: 2024-03-22 | End: 2024-03-22

## 2024-03-22 RX ORDER — SODIUM BICARBONATE 42 MG/ML
5 INJECTION, SOLUTION INTRAVENOUS
Status: COMPLETED | OUTPATIENT
Start: 2024-03-22 | End: 2024-03-22

## 2024-03-22 RX ADMIN — SODIUM BICARBONATE 10 ML: 42 INJECTION, SOLUTION INTRAVENOUS at 10:03

## 2024-03-22 RX ADMIN — DEXAMETHASONE SODIUM PHOSPHATE 100 MG: 10 INJECTION, SOLUTION INTRAMUSCULAR; INTRAVENOUS at 10:03

## 2024-03-22 RX ADMIN — LIDOCAINE HYDROCHLORIDE 5 ML: 20 INJECTION, SOLUTION INFILTRATION; PERINEURAL at 10:03

## 2024-03-22 RX ADMIN — HEPARIN SODIUM 40000 UNITS: 5000 INJECTION, SOLUTION INTRAVENOUS; SUBCUTANEOUS at 10:03

## 2024-03-22 NOTE — PROGRESS NOTES
Color, UA Yellow   Spec Grav UA 1.015   pH, UA 7.0   WBC, UA neg   Nitrite, UA neg   Protein, POC neg   Glucose, UA neg   Ketones, UA neg   Urobilinogen, UA 0.2   Bilirubin, POC neg   Blood, UA neg                   Microscopic urinalysis negative for RBCs, WBCs, nitrites

## 2024-03-22 NOTE — PROGRESS NOTES
"Bladder instillation #1 held for two hrs and "symptoms seem to be decreasing "stated by patient . Bladder instillation #2 administered with sterile technique via 15 Fr. Alexandra , small amount of clear odorless urine drained from bladder. Procedure tolerated well RTC for biweekly instillation     "

## 2024-04-05 ENCOUNTER — CLINICAL SUPPORT (OUTPATIENT)
Dept: UROLOGY | Facility: CLINIC | Age: 64
End: 2024-04-05
Payer: MEDICAID

## 2024-04-05 DIAGNOSIS — N30.10 INTERSTITIAL CYSTITIS: Primary | ICD-10-CM

## 2024-04-05 LAB
BILIRUB SERPL-MCNC: NEGATIVE MG/DL
BLOOD URINE, POC: NEGATIVE
COLOR, POC UA: YELLOW
GLUCOSE UR QL STRIP: NEGATIVE
KETONES UR QL STRIP: NEGATIVE
LEUKOCYTE ESTERASE URINE, POC: NORMAL
NITRITE, POC UA: NEGATIVE
PH, POC UA: 7
PROTEIN, POC: NEGATIVE
SPECIFIC GRAVITY, POC UA: 1.01
UROBILINOGEN, POC UA: 0.2

## 2024-04-05 PROCEDURE — 96372 THER/PROPH/DIAG INJ SC/IM: CPT | Mod: PBBFAC,59

## 2024-04-05 PROCEDURE — 99212 OFFICE O/P EST SF 10 MIN: CPT | Mod: PBBFAC,25

## 2024-04-05 PROCEDURE — 51700 IRRIGATION OF BLADDER: CPT | Mod: S$PBB,,, | Performed by: UROLOGY

## 2024-04-05 PROCEDURE — 81001 URINALYSIS AUTO W/SCOPE: CPT | Mod: PBBFAC

## 2024-04-05 PROCEDURE — 51700 IRRIGATION OF BLADDER: CPT | Mod: PBBFAC | Performed by: UROLOGY

## 2024-04-05 RX ORDER — LIDOCAINE HYDROCHLORIDE 20 MG/ML
5 INJECTION, SOLUTION INFILTRATION; PERINEURAL
Status: COMPLETED | OUTPATIENT
Start: 2024-04-05 | End: 2024-04-05

## 2024-04-05 RX ORDER — SODIUM BICARBONATE 42 MG/ML
5 INJECTION, SOLUTION INTRAVENOUS
Status: COMPLETED | OUTPATIENT
Start: 2024-04-05 | End: 2024-04-05

## 2024-04-05 RX ORDER — HEPARIN SODIUM 5000 [USP'U]/ML
40000 INJECTION, SOLUTION INTRAVENOUS; SUBCUTANEOUS
Status: COMPLETED | OUTPATIENT
Start: 2024-04-05 | End: 2024-04-05

## 2024-04-05 RX ORDER — DEXAMETHASONE SODIUM PHOSPHATE 100 MG/10ML
100 INJECTION INTRAMUSCULAR; INTRAVENOUS
Status: COMPLETED | OUTPATIENT
Start: 2024-04-05 | End: 2024-04-05

## 2024-04-05 RX ADMIN — DEXAMETHASONE SODIUM PHOSPHATE 100 MG: 10 INJECTION, SOLUTION INTRAMUSCULAR; INTRAVENOUS at 11:04

## 2024-04-05 RX ADMIN — SODIUM BICARBONATE 10 ML: 42 INJECTION, SOLUTION INTRAVENOUS at 11:04

## 2024-04-05 RX ADMIN — LIDOCAINE HYDROCHLORIDE 5 ML: 20 INJECTION, SOLUTION INFILTRATION; PERINEURAL at 11:04

## 2024-04-05 RX ADMIN — HEPARIN SODIUM 40000 UNITS: 5000 INJECTION, SOLUTION INTRAVENOUS; SUBCUTANEOUS at 11:04

## 2024-04-05 NOTE — PROGRESS NOTES
Pt presented for 2 week instillation #3. Held bladder for 2 hours. 15F in & out catheter inserted using aseptic technique w/moderate amount of clear yellow urine obtained. Bladder medication instilled w/no complications. Pt instructed to return to clinic in 2 weeks for instillation #4.

## 2024-04-05 NOTE — PATIENT INSTRUCTIONS
Bladder Instillation   Why is this procedure done?   Bladder instillation therapy is when a liquid drug is used to coat the inside of your bladder. Your doctor may order this procedure to:  Treat bladder pain or interstitial cystitis  Treat recurrent bladder infections  Prevent bladder stones  What happens before the procedure?   Your doctor will ask about your health history. Talk to the doctor about:  All the drugs you are taking. Be sure to include all prescription, over the counter, vitamins, and herbal supplements. Bring a list of drugs you take with you. Tell the doctor if you have any drug allergy.  If you take a diuretic, ask the doctor if you should take this drug the morning of your treatment.  If you think you may have a bladder infection, if you have had any fevers, or have had any recent blood in your urine  Any surgeries you have had on your bladder, kidneys, urethra, or ureters  If you need to limit fluids or avoid caffeine before the procedure  The doctor may order tests and check your urine for blood or infection.  What happens during the procedure?   The staff will place a thin, flexible tube through your urethra into your bladder. This is a catheter. The catheter will drain any urine from your bladder.  The doctor will place a mixture of fluid and drugs through the catheter into your bladder. The doctor may heat the fluid before giving it to you.  You will hold the fluid and drugs inside your bladder for a time. Talk with your doctor to find out how long you need to hold the drugs in your bladder to help them work. This could be anywhere from a few minutes to a few hours. Ask your doctor if you need to change positions while the fluid is in your bladder.  What happens after the procedure?   You may have to stay in the doctor's office or hospital for a time while the drugs are in your bladder.  The doctor may take the catheter out right away or it may be left in place to help drain out the fluid  and then be taken out afterwards.  If the doctor takes the catheter out, you will sit on the toilet and pass urine so the urine does not splash when you are allowed to get rid of the fluids and drugs from your bladder.  What care is needed at home?   Ask your doctor what you need to do when you go home. Make sure you ask questions if you do not understand everything the doctor says.  Your doctor may ask you to drink extra fluids after your procedure.  What follow-up care is needed?   Your doctor may ask you to come back to the office to check on your progress. Be sure to keep these visits.  You may need to have more bladder instillations. Talk to your doctor about how many treatments you may need.  What problems could happen?   Bladder or belly pain  Passing urine often  Urinary tract or prostate infection  Severe infection throughout the body  Blood in the urine  Burning feeling in bladder  Feeling tired  Fever  Where can I learn more?   American Cancer Society  https://www.cancer.org/cancer/bladder-cancer/treating/intravesical-therapy.html   [employer] [employer]  https://www.baus.org.uk/_userfiles/pages/files/Patients/Leaflets/Painful%20bladder.pdf   Last Reviewed Date   2021-03-18  Consumer Information Use and Disclaimer   This information is not specific medical advice and does not replace information you receive from your health care provider. This is only a brief summary of general information. It does NOT include all information about conditions, illnesses, injuries, tests, procedures, treatments, therapies, discharge instructions or life-style choices that may apply to you. You must talk with your health care provider for complete information about your health and treatment options. This information should not be used to decide whether or not to accept your health care providers advice, instructions or recommendations. Only your health care provider has the knowledge and training to  provide advice that is right for you.  Copyright   Copyright © 2021 [employer] and its affiliates and/or licensors. All rights reserved.

## 2024-04-16 ENCOUNTER — OFFICE VISIT (OUTPATIENT)
Dept: SURGERY | Facility: CLINIC | Age: 64
End: 2024-04-16
Payer: MEDICAID

## 2024-04-16 VITALS
HEART RATE: 78 BPM | RESPIRATION RATE: 18 BRPM | WEIGHT: 132 LBS | BODY MASS INDEX: 21.99 KG/M2 | HEIGHT: 65 IN | TEMPERATURE: 99 F | SYSTOLIC BLOOD PRESSURE: 139 MMHG | OXYGEN SATURATION: 98 % | DIASTOLIC BLOOD PRESSURE: 89 MMHG

## 2024-04-16 DIAGNOSIS — K62.3 RECTAL PROLAPSE: ICD-10-CM

## 2024-04-16 PROCEDURE — 99215 OFFICE O/P EST HI 40 MIN: CPT | Mod: PBBFAC

## 2024-04-16 RX ORDER — FLUTICASONE FUROATE, UMECLIDINIUM BROMIDE AND VILANTEROL TRIFENATATE 200; 62.5; 25 UG/1; UG/1; UG/1
1 POWDER RESPIRATORY (INHALATION) DAILY
COMMUNITY
Start: 2024-03-27

## 2024-04-16 RX ORDER — FLUCONAZOLE 200 MG/1
200 TABLET ORAL ONCE
COMMUNITY
Start: 2024-04-08

## 2024-04-16 NOTE — PROGRESS NOTES
Patient seen by Dr. Nur. Patient instructed to RTC as needed. Patient will be referred to pelvic floor therapy.

## 2024-04-16 NOTE — PROGRESS NOTES
" General Surgery Clinic Note      CC: rectal prolapse     Subjective  Araceli Cosby is a 64 y.o. female with a past medical history of GERD, osteoporosis, interstitial cystitis, and fibromyalgia who presents to clinic as a referral from her GI doctor for rectal prolapse. The patient states she first noticed this in June of 2023. She says she has been able to reduce it on her own, but it will recur when she bears down to have a bowel movement. She says she has noticed it 4-5 times since January. She notes a history of constipation since 2020. She has been taking stool softeners and fiber for the past two weeks which have improved her symptoms. She is having a bowel movement every day for the last two weeks and describes them as pellets. She feels "full" today and is in pain, which she describes as dull, deep pressure. The pain is worse with sitting. She notices the pain improves with bowel movements. She also notices bright red blood occasionally whenever she wipes, but none in the toilet. Patient noted she had underwent pelvic floor therapy at [facility] in August of 2023 which helped improve her symptoms.      ROS  Pertinent symptoms noted in the HPI, otherwise negative.      PMHx:   Past Medical History:   Diagnosis Date    Allergies     Fibromyalgia     GERD (gastroesophageal reflux disease)     Interstitial cystitis     Mitral valve prolapse     Muscle disease     Osteoarthritis     Osteoporosis     Skin cancer      PSHx:   Past Surgical History:   Procedure Laterality Date    APPENDECTOMY      CARPAL TUNNEL RELEASE Right     FINGER SURGERY N/A     left thumb    HYSTERECTOMY      pace maker      REPLACEMENT OF PACEMAKER GENERATOR N/A 10/12/2023    Procedure: REPLACEMENT, PACEMAKER GENERATOR;  Surgeon: Austin Willis MD;  Location: [facility] CATH LAB;  Service: Cardiology;  Laterality: N/A;  PPM GEN CHANGE (BS) MAY NEED TEMP PACING    RIB FRACTURE SURGERY Right     1st rib and extra rib removed     FHx:   Family " History   Problem Relation Name Age of Onset    Arthritis Mother      No Known Problems Father      No Known Problems Sister      No Known Problems Brother      No Known Problems Maternal Aunt      No Known Problems Maternal Uncle      No Known Problems Paternal Aunt      No Known Problems Paternal Uncle      No Known Problems Maternal Grandmother      No Known Problems Maternal Grandfather      No Known Problems Paternal Grandmother      No Known Problems Paternal Grandfather      Aneurysm Neg Hx      Cancer Neg Hx      Clotting disorder Neg Hx      Dementia Neg Hx      Diabetes Neg Hx      Fainting Neg Hx      Heart disease Neg Hx      Hyperlipidemia Neg Hx      Kidney disease Neg Hx      Liver disease Neg Hx      Migraines Neg Hx      Neuropathy Neg Hx      Obesity Neg Hx      Parkinsonism Neg Hx      Seizures Neg Hx      Stroke Neg Hx      Tremor Neg Hx       Meds:   Current Outpatient Medications on File Prior to Visit   Medication Sig Dispense Refill    albuterol (PROVENTIL/VENTOLIN HFA) 90 mcg/actuation inhaler Inhale 1 puff into the lungs every 4 (four) hours.      azelastine 205.5 mcg (0.15 %) Spry 205.5 mcg by Nasal route daily as needed.      carBAMazepine (TEGRETOL) 100 mg chewable tablet Take 100 mg by mouth.      carboxymethylcellulose (REFRESH PLUS) 0.5 % Dpet 1 drop 3 (three) times daily as needed.      cetirizine (ZYRTEC) 5 MG tablet Take 5 mg by mouth 2 (two) times daily.      chlorzoxazone (PARAFON FORTE) 500 mg Tab TAKE ONE TABLET BY MOUTH THREEE TIMES A DAY AS NEEDED FOR PAIN      cholecalciferol, vitamin D3, 100 mcg (4,000 unit) Cap capsule Take 1 capsule by mouth once daily.      clobetasol 0.05% (TEMOVATE) 0.05 % Oint Apply topically 2 (two) times daily.      cycloSPORINE (RESTASIS) 0.05 % ophthalmic emulsion Place 1 drop into both eyes 2 (two) times daily. 60 each 4    dextromethorphan-guaiFENesin  mg (MUCINEX DM)  mg per 12 hr tablet Take 1 tablet by mouth every 12 (twelve)  hours.      dicyclomine (BENTYL) 10 MG capsule Take 10 mg by mouth every 6 (six) hours as needed.      diphenhydrAMINE (BENADRYL) 50 MG capsule Take 50 mg by mouth every 6 (six) hours as needed for Itching.      famotidine (PEPCID) 40 MG tablet Take 40 mg by mouth every evening.      fluconazole (DIFLUCAN) 200 MG Tab Take 200 mg by mouth once.      fluticasone propionate (FLONASE) 50 mcg/actuation nasal spray 1 spray by Each Nostril route every morning.      ipratropium (ATROVENT) 42 mcg (0.06 %) nasal spray SMARTSI Spray(s) Both Nares Every Night PRN      ketotifen (ZADITOR) 0.025 % (0.035 %) ophthalmic solution 1 drop 2 (two) times daily.      Lactobacillus rhamnosus GG (CULTURELLE) 10 billion cell capsule Take 1 capsule by mouth once daily.      magnesium oxide (MAG-OX) 400 mg (241.3 mg magnesium) tablet Take 500 mg by mouth 2 (two) times daily.      mometasone 0.1% (ELOCON) 0.1 % cream Apply topically once daily.      montelukast (SINGULAIR) 10 mg tablet Take 10 mg by mouth every evening.      mupirocin (BACTROBAN) 2 % ointment Apply topically 3 (three) times daily.      nystatin (MYCOSTATIN) cream Apply topically 2 (two) times daily.      oxyCODONE-acetaminophen (PERCOCET)  mg per tablet Take by mouth every 6 (six) hours as needed for Pain.      oxyCODONE-acetaminophen (ROXICET) 5-325 mg/5 mL Soln Take by mouth.      pantoprazole (PROTONIX) 40 MG tablet Take 40 mg by mouth once daily.      predniSONE (DELTASONE) 50 MG Tab Take 50 mg by mouth once.      pregabalin (LYRICA) 75 MG capsule Take 75 mg by mouth 2 (two) times daily.      TRELEGY ELLIPTA 200-62.5-25 mcg inhaler Inhale 1 puff into the lungs once daily.      triamcinolone acetonide 0.1% (KENALOG) 0.1 % cream Apply 0.12 g topically 2 (two) times daily.      triamcinolone acetonide 0.1% (KENALOG) 0.1 % ointment Apply topically 2 (two) times daily.      cetirizine (ZYRTEC) 10 MG tablet Take 10 mg by mouth once daily. (Patient not taking: Reported  on 2024)      hydrocortisone 2.5 % cream Place rectally.      valACYclovir (VALTREX) 500 MG tablet Take 1 tablet (500 mg total) by mouth 2 (two) times daily. for 3 days 6 tablet 2    [DISCONTINUED] ciprofloxacin HCl (CIPRO) 500 MG tablet Take 500 mg by mouth every 12 (twelve) hours.      [DISCONTINUED] flecainide (TAMBOCOR) 50 MG Tab Take 50 mg by mouth every 12 (twelve) hours.      [DISCONTINUED] lactulose (CHRONULAC) 10 gram/15 mL solution SMARTSI Milliliter(s) By Mouth Twice Daily PRN      [DISCONTINUED] psyllium (HYDROCIL) packet Take 1 packet by mouth once daily.       Current Facility-Administered Medications on File Prior to Visit   Medication Dose Route Frequency Provider Last Rate Last Admin    vancomycin injection 1,000 mg  1,000 mg Intravenous On Call Procedure Austin Willis MD   1,000 mg at 10/12/23 1000     Social: Denies tobacco, alcohol, or recreational drug use.     Allergies:   Review of patient's allergies indicates:   Allergen Reactions    Amlodipine Swelling    Cardizem [diltiazem hcl] Swelling    Iodinated contrast media Rash    Metoprolol Shortness Of Breath    Acyclovir analogues Other (See Comments)     Headache    Azithromycin Other (See Comments)     Hard on stomach    Cephalexin Other (See Comments)    Gabapentin      Other reaction(s): GI Upset, Headache    Hydromorphone     Ibuprofen-famotidine      Other reaction(s): GI Upset    Nsaids (non-steroidal anti-inflammatory drug)     Paroxetine Other (See Comments)     Other reaction(s): Cognitive Disruption    Serotonin 5ht-3 antagonists     Tramadol Other (See Comments)     nervousness    Wellbutrin [bupropion hcl] Other (See Comments)     headache    Ciprofloxacin Nausea And Vomiting     Other reaction(s): GI Upset, Headache    Codeine Rash    Cyclobenzaprine Rash    Doxycycline Rash    Iodine and iodide containing products Rash    Paxil [paroxetine hcl] Anxiety    Sulfa (sulfonamide antibiotics) Rash        Objective  Vitals:     04/16/24 1328   BP: 139/89   Pulse: 78   Resp: 18   Temp: 98.7 °F (37.1 °C)        Gen: Pt in NAD  CV: RR  Resp: NWOB on room air  Abd: Soft, non tender, non distended  SHAWNA: No external hemorrhoids noted, no prolapse reproduced on exam, no tenderness, SHAWNA with redundant rectal tissue but no masses         Assessment/Plan  Araceli Cosby is a 64 y.o. female with a past medical history of GERD, osteoporosis, interstitial cystitis, and fibromyalgia who presents to clinic as a referral from her GI doctor for rectal prolapse. Patient noticed prolapse 4-5 times since January. History of constipation, taking stool softeners and fiber for past two weeks with symptom improvement.     - No surgical intervention at this time as prolapse is small and infrequent  - Continue taking stool softeners and fiber every day with goal of having one soft bowel movement a day without straining  - Referral to pelvic floor therapy  - Follow up PRMORGAN Hooker MS3  [facility]      Addendum  Patient seen and examined.  Agree with above, edited as needed.    Lashae Nur MD  [facility] Surgery, PGY3

## 2024-04-17 NOTE — PROGRESS NOTES
I have reviewed the notes, assessments, and/or procedures performed by the resident, I concur with her/his documentation of Araceli Cosby.     Taina Main MD

## 2024-04-19 ENCOUNTER — CLINICAL SUPPORT (OUTPATIENT)
Dept: UROLOGY | Facility: CLINIC | Age: 64
End: 2024-04-19
Payer: MEDICAID

## 2024-04-19 DIAGNOSIS — N30.10 INTERSTITIAL CYSTITIS: Primary | ICD-10-CM

## 2024-04-19 LAB
BILIRUB SERPL-MCNC: NORMAL MG/DL
BLOOD URINE, POC: NORMAL
COLOR, POC UA: YELLOW
GLUCOSE UR QL STRIP: NORMAL
KETONES UR QL STRIP: NORMAL
LEUKOCYTE ESTERASE URINE, POC: NORMAL
NITRITE, POC UA: NORMAL
PH, POC UA: 7
PROTEIN, POC: NORMAL
SPECIFIC GRAVITY, POC UA: 1.02
UROBILINOGEN, POC UA: 0.2

## 2024-04-19 PROCEDURE — 51700 IRRIGATION OF BLADDER: CPT | Mod: PBBFAC | Performed by: UROLOGY

## 2024-04-19 PROCEDURE — 81001 URINALYSIS AUTO W/SCOPE: CPT | Mod: PBBFAC

## 2024-04-19 PROCEDURE — 51700 IRRIGATION OF BLADDER: CPT | Mod: S$PBB,,, | Performed by: UROLOGY

## 2024-04-19 PROCEDURE — 99212 OFFICE O/P EST SF 10 MIN: CPT | Mod: PBBFAC,25

## 2024-04-19 PROCEDURE — 96372 THER/PROPH/DIAG INJ SC/IM: CPT | Mod: PBBFAC

## 2024-04-19 RX ORDER — DEXAMETHASONE SODIUM PHOSPHATE 100 MG/10ML
INJECTION INTRAMUSCULAR; INTRAVENOUS
Status: COMPLETED
Start: 2024-04-19 | End: 2024-04-19

## 2024-04-19 RX ORDER — HEPARIN SODIUM 5000 [USP'U]/ML
40000 INJECTION, SOLUTION INTRAVENOUS; SUBCUTANEOUS ONCE
Status: COMPLETED | OUTPATIENT
Start: 2024-04-19 | End: 2024-04-19

## 2024-04-19 RX ORDER — LIDOCAINE HYDROCHLORIDE 20 MG/ML
5 INJECTION, SOLUTION INFILTRATION; PERINEURAL
Status: COMPLETED | OUTPATIENT
Start: 2024-04-19 | End: 2024-04-19

## 2024-04-19 RX ORDER — HEPARIN SODIUM 5000 [USP'U]/ML
INJECTION, SOLUTION INTRAVENOUS; SUBCUTANEOUS
Status: DISCONTINUED
Start: 2024-04-19 | End: 2024-04-19

## 2024-04-19 RX ORDER — DEXAMETHASONE SODIUM PHOSPHATE 100 MG/10ML
100 INJECTION INTRAMUSCULAR; INTRAVENOUS
Status: DISCONTINUED | OUTPATIENT
Start: 2024-04-19 | End: 2024-04-19

## 2024-04-19 RX ORDER — LIDOCAINE HYDROCHLORIDE 20 MG/ML
INJECTION, SOLUTION INFILTRATION; PERINEURAL
Status: DISCONTINUED
Start: 2024-04-19 | End: 2024-04-19

## 2024-04-19 RX ORDER — SODIUM BICARBONATE 42 MG/ML
10 INJECTION, SOLUTION INTRAVENOUS ONCE
Status: COMPLETED | OUTPATIENT
Start: 2024-04-19 | End: 2024-04-19

## 2024-04-19 RX ORDER — SODIUM BICARBONATE 42 MG/ML
INJECTION, SOLUTION INTRAVENOUS
Status: DISCONTINUED
Start: 2024-04-19 | End: 2024-04-19

## 2024-04-19 RX ADMIN — HEPARIN SODIUM 40000 UNITS: 5000 INJECTION, SOLUTION INTRAVENOUS; SUBCUTANEOUS at 10:04

## 2024-04-19 RX ADMIN — DEXAMETHASONE SODIUM PHOSPHATE: 10 INJECTION, SOLUTION INTRAMUSCULAR; INTRAVENOUS at 10:04

## 2024-04-19 RX ADMIN — SODIUM BICARBONATE 10 ML: 42 INJECTION, SOLUTION INTRAVENOUS at 10:04

## 2024-04-19 RX ADMIN — LIDOCAINE HYDROCHLORIDE 5 ML: 20 INJECTION, SOLUTION INFILTRATION; PERINEURAL at 10:04

## 2024-04-19 NOTE — PROGRESS NOTES
Pt presented for 2 week bladder instillation #4. Pt held bladder for 2 hours. 15F in & out catheter inserted using aseptic technique w/moderate amount of clear yellow urine obtained. Bladder medication instilled w/no complications. Pt stated she will call to schedule for next instillation as she would like to take a break due to other issues taking precedence.

## 2024-04-19 NOTE — PATIENT INSTRUCTIONS
Bladder Instillation   Why is this procedure done?   Bladder instillation therapy is when a liquid drug is used to coat the inside of your bladder. Your doctor may order this procedure to:  Treat bladder pain or interstitial cystitis  Treat recurrent bladder infections  Prevent bladder stones  What happens before the procedure?   Your doctor will ask about your health history. Talk to the doctor about:  All the drugs you are taking. Be sure to include all prescription, over the counter, vitamins, and herbal supplements. Bring a list of drugs you take with you. Tell the doctor if you have any drug allergy.  If you take a diuretic, ask the doctor if you should take this drug the morning of your treatment.  If you think you may have a bladder infection, if you have had any fevers, or have had any recent blood in your urine  Any surgeries you have had on your bladder, kidneys, urethra, or ureters  If you need to limit fluids or avoid caffeine before the procedure  The doctor may order tests and check your urine for blood or infection.  What happens during the procedure?   The staff will place a thin, flexible tube through your urethra into your bladder. This is a catheter. The catheter will drain any urine from your bladder.  The doctor will place a mixture of fluid and drugs through the catheter into your bladder. The doctor may heat the fluid before giving it to you.  You will hold the fluid and drugs inside your bladder for a time. Talk with your doctor to find out how long you need to hold the drugs in your bladder to help them work. This could be anywhere from a few minutes to a few hours. Ask your doctor if you need to change positions while the fluid is in your bladder.  What happens after the procedure?   You may have to stay in the doctor's office or hospital for a time while the drugs are in your bladder.  The doctor may take the catheter out right away or it may be left in place to help drain out the fluid  and then be taken out afterwards.  If the doctor takes the catheter out, you will sit on the toilet and pass urine so the urine does not splash when you are allowed to get rid of the fluids and drugs from your bladder.  What care is needed at home?   Ask your doctor what you need to do when you go home. Make sure you ask questions if you do not understand everything the doctor says.  Your doctor may ask you to drink extra fluids after your procedure.  What follow-up care is needed?   Your doctor may ask you to come back to the office to check on your progress. Be sure to keep these visits.  You may need to have more bladder instillations. Talk to your doctor about how many treatments you may need.  What problems could happen?   Bladder or belly pain  Passing urine often  Urinary tract or prostate infection  Severe infection throughout the body  Blood in the urine  Burning feeling in bladder  Feeling tired  Fever  Where can I learn more?   American Cancer Society  https://www.cancer.org/cancer/bladder-cancer/treating/intravesical-therapy.html   Citizen of [place] [employer]  https://www.baus.org.uk/_userfiles/pages/files/Patients/Leaflets/Painful%20bladder.pdf   Last Reviewed Date   2021-03-18  Consumer Information Use and Disclaimer   This information is not specific medical advice and does not replace information you receive from your health care provider. This is only a brief summary of general information. It does NOT include all information about conditions, illnesses, injuries, tests, procedures, treatments, therapies, discharge instructions or life-style choices that may apply to you. You must talk with your health care provider for complete information about your health and treatment options. This information should not be used to decide whether or not to accept your health care providers advice, instructions or recommendations. Only your health care provider has the knowledge and training to  provide advice that is right for you.  Copyright   Copyright © 2021 [employer], [employer]. and its affiliates and/or licensors. All rights reserved.

## 2024-05-24 DIAGNOSIS — R13.12 OROPHARYNGEAL DYSPHAGIA: Primary | ICD-10-CM

## 2024-05-24 DIAGNOSIS — K21.00 GASTRO-ESOPHAGEAL REFLUX DISEASE WITH ESOPHAGITIS, WITHOUT BLEEDING: ICD-10-CM

## 2024-06-07 ENCOUNTER — HOSPITAL ENCOUNTER (OUTPATIENT)
Dept: RADIOLOGY | Facility: HOSPITAL | Age: 64
Discharge: HOME | End: 2024-06-07
Payer: MEDICAID

## 2024-06-07 ENCOUNTER — CLINICAL SUPPORT (OUTPATIENT)
Dept: REHABILITATION | Facility: HOSPITAL | Age: 64
End: 2024-06-07
Payer: MEDICAID

## 2024-06-07 DIAGNOSIS — K21.00 GASTRO-ESOPHAGEAL REFLUX DISEASE WITH ESOPHAGITIS, WITHOUT BLEEDING: ICD-10-CM

## 2024-06-07 DIAGNOSIS — R13.12 OROPHARYNGEAL DYSPHAGIA: ICD-10-CM

## 2024-06-07 PROCEDURE — A9698 NON-RAD CONTRAST MATERIALNOC: HCPCS

## 2024-06-07 PROCEDURE — 92611 MOTION FLUOROSCOPY/SWALLOW: CPT

## 2024-06-07 PROCEDURE — 25500020 PHARM REV CODE 255

## 2024-06-07 PROCEDURE — 74230 X-RAY XM SWLNG FUNCJ C+: CPT | Mod: TC

## 2024-06-07 RX ADMIN — BARIUM SULFATE 10 ML: 0.81 POWDER, FOR SUSPENSION ORAL at 08:06

## 2024-06-09 NOTE — PROGRESS NOTES
[facility]  Speech Language Pathology Department  Outpatient Modified Barium Swallow Study    Patient Name:  Araceli Cosby   MRN:  [id number]    Recommendations     General recommendations:  home health vs outpatient SLP services for dysphagia  and follow up with PCP as scheduled  Repeat MBS study: 6-8 weeks or as clinically warranted  Diet texture/consistency recommendations: Easy to Chew solids (IDDSI 7) and mildly thick liquids (IDDSI 2)  Medications: per patient preference  Swallow strategies/precautions: small bites/sips and slow rate  History/Reason for Referral     Araceli Cosby is a/n 64 y.o. female referred by PCP for a Modified Barium Swallow Study due to dysphagia.     Past medical history includes GERD, osteoporosis, interstitial cystitis    Pt denies a history of PNA, coughing with PO intake    Home diet texture/consistency: Regular and thin liquids  Current Method of Nutrition: PO diet (regular/thin)     Subjective     Patient awake, alert, oriented x4, and cooperative.  Spiritual/Cultural/Taoism Beliefs/Practices that affect care: no    Pain/Comfort: 0/10  Respiratory Status: room air  Restraints/positioning devices: none    Fluoroscopic Findings     Oral Musculature  Dentition: own teeth  Secretion Management: adequate  Mucosal Quality: good  Facial Movement: WFL  Buccal Strength & Mobility: WFL  Mandibular Strength & Mobility: WFL  Oral Labial Strength & Mobility: WFL  Lingual Strength & Mobility: WFL  Velar Elevation: WFL  Vocal Quality: adequate  Volitional Cough: Able to clear secretions    Setup  Upright in bed  Able to self feed  Adequate head control    Visualization  Lateral view    Oral Phase:   Adequate lip closure  Adequate bolus formation  Adequate mastication  Adequate bolus cohesion  Adequate anterior-posterior transport    Pharyngeal Phase:   Timely swallow reflex  Reduced base of tongue retraction  Reduced hyolaryngeal excursion  Reduced airway  protection  Laryngeal penetration    Aspiration    Reduced UES opening  Consistency Laryngeal Penetration Aspiration Residue   Mildly thick liquid by straw None None None   Thin liquid by cup During the swallow  Did NOT clear During the swallow, posteriorly   Delayed cough response NOT productive None   Thin liquid by straw During the swallow  Did NOT clear None None   Puree None None Mild  Valleculae  Cleared with additional swallow   Chewable solid None None None       Cervical Esophageal Phase:   decreased UES opening    Compensatory Strategies:  Small sips were not successful for reducing the occurrence of aspiration    Assessment     Pt exhibited moderate oropharyngeal dysphagia characterized by the findings noted above and secondary to structural abnormalities of the cervical spine.  Aspiration of thin liquids via cup was visualized and did not clear placing the pt at risk for aspiration.  Swallow efficiency is preserved; however, swallow safety is impaired.     Patient appears to be at moderate risk for aspiration related pneumonia when considering severity of dysphagia and complicated medical status.  Prognosis for behavioral swallow rehabilitation is fair.    Patient Education     Patient provided with verbal education, handouts, and demonstrations regarding POC.  Understanding was verbalized.    Time Tracking     SLP Treatment Date:  6/7/24  Speech Start Time:  0900  Speech Stop Time:  0925     Speech Total Time (min):  25    Billable minutes:   Motion Fluoroscopic Evaluation, Video Recording, 25 minutes     06/09/2024

## 2024-06-10 ENCOUNTER — OFFICE VISIT (OUTPATIENT)
Dept: UROLOGY | Facility: CLINIC | Age: 64
End: 2024-06-10
Payer: MEDICAID

## 2024-06-10 VITALS
SYSTOLIC BLOOD PRESSURE: 120 MMHG | WEIGHT: 128 LBS | RESPIRATION RATE: 19 BRPM | TEMPERATURE: 98 F | HEART RATE: 67 BPM | OXYGEN SATURATION: 99 % | HEIGHT: 65 IN | BODY MASS INDEX: 21.33 KG/M2 | DIASTOLIC BLOOD PRESSURE: 77 MMHG

## 2024-06-10 DIAGNOSIS — N30.10 INTERSTITIAL CYSTITIS: Primary | ICD-10-CM

## 2024-06-10 DIAGNOSIS — N39.41 URGE INCONTINENCE: ICD-10-CM

## 2024-06-10 DIAGNOSIS — R39.89 BLADDER PAIN: ICD-10-CM

## 2024-06-10 LAB
BILIRUB SERPL-MCNC: NORMAL MG/DL
BLOOD URINE, POC: NORMAL
COLOR, POC UA: YELLOW
GLUCOSE UR QL STRIP: NORMAL
KETONES UR QL STRIP: NORMAL
LEUKOCYTE ESTERASE URINE, POC: NORMAL
NITRITE, POC UA: NORMAL
PH, POC UA: 7.5
POC RESIDUAL URINE VOLUME: 91 ML (ref 0–100)
PROTEIN, POC: NORMAL
SPECIFIC GRAVITY, POC UA: 1.01
UROBILINOGEN, POC UA: 0.2

## 2024-06-10 PROCEDURE — 99214 OFFICE O/P EST MOD 30 MIN: CPT | Mod: S$PBB,,, | Performed by: UROLOGY

## 2024-06-10 PROCEDURE — 99215 OFFICE O/P EST HI 40 MIN: CPT | Mod: PBBFAC,25 | Performed by: UROLOGY

## 2024-06-10 PROCEDURE — 1159F MED LIST DOCD IN RCRD: CPT | Mod: CPTII,,, | Performed by: UROLOGY

## 2024-06-10 PROCEDURE — 3074F SYST BP LT 130 MM HG: CPT | Mod: CPTII,,, | Performed by: UROLOGY

## 2024-06-10 PROCEDURE — 81001 URINALYSIS AUTO W/SCOPE: CPT | Mod: PBBFAC | Performed by: UROLOGY

## 2024-06-10 PROCEDURE — 3078F DIAST BP <80 MM HG: CPT | Mod: CPTII,,, | Performed by: UROLOGY

## 2024-06-10 PROCEDURE — 51798 US URINE CAPACITY MEASURE: CPT | Mod: PBBFAC | Performed by: UROLOGY

## 2024-06-10 PROCEDURE — 3008F BODY MASS INDEX DOCD: CPT | Mod: CPTII,,, | Performed by: UROLOGY

## 2024-06-10 PROCEDURE — 1160F RVW MEDS BY RX/DR IN RCRD: CPT | Mod: CPTII,,, | Performed by: UROLOGY

## 2024-06-10 RX ORDER — ALBUTEROL SULFATE AND BUDESONIDE 90; 80 UG/1; UG/1
2 AEROSOL, METERED RESPIRATORY (INHALATION) DAILY
COMMUNITY

## 2024-06-10 RX ORDER — AMOXICILLIN AND CLAVULANATE POTASSIUM 875; 125 MG/1; MG/1
1 TABLET, FILM COATED ORAL 2 TIMES DAILY
COMMUNITY
Start: 2024-06-06

## 2024-06-10 NOTE — PROGRESS NOTES
CC:  Follow-up    HPI:  Araceli Cosby is a 64 y.o. female here for follow-up of  She was diagnosed with IC in the late 1980's or early 1990's.  She had DMSO done in 1999 but she had a lot of bladder spasms with that so she stopped taking it.  She tries to follow an IC diet.  She recently had pelvic physical therapy for fractures and it helped her with the bladder. She had some instillations but she thinks they were worsening her fibromyalgia because she was more fatigued.    She has been having some sharp pain in the bladder which feels like a cutting sensation.    She gets the sensation that she has to urinate and then will go and won't be able to.  She doesn't feel like the bladder empties because she feels like the bladder is irritated and swollen.  She has also been having some post void dribbling.    She was recently diagnosed with hyperparthyroidism.  She denies any history of kidney stones.  She complains of urgency and urge incontinence.  She denies stress urinary incontinence.      Bladder scan residual:  91 ml    Urinalysis:    Results for orders placed or performed in visit on 06/10/24   POCT URINE DIPSTICK WITH MICROSCOPE, AUTOMATED   Result Value Ref Range    Color, UA Yellow     Spec Grav UA 1.015     pH, UA 7.5     WBC, UA neg     Nitrite, UA neg     Protein, POC neg     Glucose, UA neg     Ketones, UA neg     Urobilinogen, UA 0.2     Bilirubin, POC neg     Blood, UA neg      Microscopic Urinalysis:  WBC:   None per HPF     RBC:    None per HPF     Bacteria:    None per HPF     Squamous epithelial cells:    None per HPF      Crystals:   None    ROS:  All systems reviewed and are negative except as documented in HPI and/or Assessment and Plan.     Patient Active Problem List:     Patient Active Problem List   Diagnosis    Meibomian gland disease    Blepharitis of upper and lower eyelids of both eyes    Age related osteoporosis    Trigger finger, left index finger    Carpal tunnel syndrome on left     Interstitial cystitis    Chronic pelvic pain in female    Encounter for pacemaker at end of battery life        Past Medical History:  Past Medical History:   Diagnosis Date    Allergies     Fibromyalgia     GERD (gastroesophageal reflux disease)     Interstitial cystitis     Mitral valve prolapse     Muscle disease     Osteoarthritis     Osteoporosis     Skin cancer         Past Surgical History:  Past Surgical History:   Procedure Laterality Date    APPENDECTOMY      CARPAL TUNNEL RELEASE Right     FINGER SURGERY N/A     left thumb    HYSTERECTOMY      pace maker      REPLACEMENT OF PACEMAKER GENERATOR N/A 10/12/2023    Procedure: REPLACEMENT, PACEMAKER GENERATOR;  Surgeon: Austin Willis MD;  Location: [facility] CATH LAB;  Service: Cardiology;  Laterality: N/A;  PPM GEN CHANGE (BS) MAY NEED TEMP PACING    RIB FRACTURE SURGERY Right     1st rib and extra rib removed        Family History:  Family History   Problem Relation Name Age of Onset    Arthritis Mother      No Known Problems Father      No Known Problems Sister      No Known Problems Brother      No Known Problems Maternal Aunt      No Known Problems Maternal Uncle      No Known Problems Paternal Aunt      No Known Problems Paternal Uncle      No Known Problems Maternal Grandmother      No Known Problems Maternal Grandfather      No Known Problems Paternal Grandmother      No Known Problems Paternal Grandfather      Aneurysm Neg Hx      Cancer Neg Hx      Clotting disorder Neg Hx      Dementia Neg Hx      Diabetes Neg Hx      Fainting Neg Hx      Heart disease Neg Hx      Hyperlipidemia Neg Hx      Kidney disease Neg Hx      Liver disease Neg Hx      Migraines Neg Hx      Neuropathy Neg Hx      Obesity Neg Hx      Parkinsonism Neg Hx      Seizures Neg Hx      Stroke Neg Hx      Tremor Neg Hx          Social History:  Social History     Socioeconomic History    Marital status: Single   Tobacco Use    Smoking status: Former     Current packs/day: 0.00      Types: Cigarettes     Quit date:      Years since quittin.4     Passive exposure: Past    Smokeless tobacco: Former     Quit date:    Substance and Sexual Activity    Alcohol use: Not Currently    Drug use: Never    Sexual activity: Not Currently        Allergies:  Review of patient's allergies indicates:   Allergen Reactions    Amlodipine Swelling    Cardizem [diltiazem hcl] Swelling    Iodinated contrast media Rash    Metoprolol Shortness Of Breath    Trelegy ellipta [fluticasone-umeclidin-vilanter] Other (See Comments)    Acyclovir analogues Other (See Comments)     Headache    Azithromycin Other (See Comments)     Hard on stomach    Cephalexin Other (See Comments)    Gabapentin      Other reaction(s): GI Upset, Headache    Hydromorphone     Ibuprofen-famotidine      Other reaction(s): GI Upset    Nsaids (non-steroidal anti-inflammatory drug)     Paroxetine Other (See Comments)     Other reaction(s): Cognitive Disruption    Serotonin 5ht-3 antagonists     Tramadol Other (See Comments)     nervousness    Wellbutrin [bupropion hcl] Other (See Comments)     headache    Ciprofloxacin Nausea And Vomiting     Other reaction(s): GI Upset, Headache    Codeine Rash    Cyclobenzaprine Rash    Doxycycline Rash    Iodine and iodide containing products Rash    Paxil [paroxetine hcl] Anxiety    Sulfa (sulfonamide antibiotics) Rash        Objective:  Vitals:    06/10/24 0927   BP: 120/77   Pulse: 67   Resp: 19   Temp: 98.2 °F (36.8 °C)     General:  Well developed, well nourished adult female in no acute distress  Abdomen: Soft, nontender, no masses  Extremities:  No clubbing, cyanosis, or edema  Neurologic:  Grossly intact  Musculoskeletal:  Normal tone    Assessment:  1. Interstitial cystitis  - Ambulatory referral/consult to Physical/Occupational Therapy; Future    2. Urge incontinence  - POCT URINE DIPSTICK WITH MICROSCOPE, AUTOMATED  - POCT Bladder Scan    3. Bladder pain       Plan:  She has stopped  instillations as stated in the HPI.  She would like to do pelvic floor physical therapy because this has helped in the past.  I sent a referral but she said they did not receive it.  I sent the referral again.    Observation.    I recommended timed voiding.      Follow-up:    Six months or sooner prn.

## 2024-06-10 NOTE — PROGRESS NOTES
Patient seen by Dr. WIL Marie. Will return in 6 months. Written and verbal discharge instructions given.

## 2024-08-07 ENCOUNTER — OFFICE VISIT (OUTPATIENT)
Dept: ENDOCRINOLOGY | Facility: CLINIC | Age: 64
End: 2024-08-07
Payer: MEDICAID

## 2024-08-07 ENCOUNTER — LAB VISIT (OUTPATIENT)
Dept: LAB | Facility: HOSPITAL | Age: 64
End: 2024-08-07
Attending: NURSE PRACTITIONER
Payer: MEDICAID

## 2024-08-07 VITALS
SYSTOLIC BLOOD PRESSURE: 131 MMHG | HEART RATE: 81 BPM | TEMPERATURE: 98 F | HEIGHT: 65 IN | RESPIRATION RATE: 17 BRPM | WEIGHT: 126.88 LBS | DIASTOLIC BLOOD PRESSURE: 78 MMHG | BODY MASS INDEX: 21.14 KG/M2

## 2024-08-07 DIAGNOSIS — E21.3 HYPERPARATHYROIDISM: ICD-10-CM

## 2024-08-07 DIAGNOSIS — M81.0 OSTEOPOROSIS, UNSPECIFIED OSTEOPOROSIS TYPE, UNSPECIFIED PATHOLOGICAL FRACTURE PRESENCE: ICD-10-CM

## 2024-08-07 DIAGNOSIS — E04.2 MULTINODULAR GOITER: Primary | ICD-10-CM

## 2024-08-07 LAB
25(OH)D3+25(OH)D2 SERPL-MCNC: 45 NG/ML (ref 30–80)
ALBUMIN SERPL-MCNC: 4.1 G/DL (ref 3.4–4.8)
BUN SERPL-MCNC: 7.5 MG/DL (ref 9.8–20.1)
CALCIUM SERPL-MCNC: 10 MG/DL (ref 8.4–10.2)
CHLORIDE SERPL-SCNC: 103 MMOL/L (ref 98–107)
CO2 SERPL-SCNC: 26 MMOL/L (ref 23–31)
CREAT SERPL-MCNC: 0.7 MG/DL (ref 0.55–1.02)
GFR SERPLBLD CREATININE-BSD FMLA CKD-EPI: >60 ML/MIN/1.73/M2
GLUCOSE SERPL-MCNC: 91 MG/DL (ref 82–115)
MAGNESIUM SERPL-MCNC: 2.1 MG/DL (ref 1.6–2.6)
PHOSPHATE SERPL-MCNC: 2.6 MG/DL (ref 2.3–4.7)
POTASSIUM SERPL-SCNC: 4 MMOL/L (ref 3.5–5.1)
PTH-INTACT SERPL-MCNC: 216.2 PG/ML (ref 8.7–77)
SODIUM SERPL-SCNC: 137 MMOL/L (ref 136–145)

## 2024-08-07 PROCEDURE — 3078F DIAST BP <80 MM HG: CPT | Mod: CPTII,,, | Performed by: NURSE PRACTITIONER

## 2024-08-07 PROCEDURE — 3075F SYST BP GE 130 - 139MM HG: CPT | Mod: CPTII,,, | Performed by: NURSE PRACTITIONER

## 2024-08-07 PROCEDURE — 83735 ASSAY OF MAGNESIUM: CPT

## 2024-08-07 PROCEDURE — 80069 RENAL FUNCTION PANEL: CPT

## 2024-08-07 PROCEDURE — 36415 COLL VENOUS BLD VENIPUNCTURE: CPT

## 2024-08-07 PROCEDURE — 3008F BODY MASS INDEX DOCD: CPT | Mod: CPTII,,, | Performed by: NURSE PRACTITIONER

## 2024-08-07 PROCEDURE — 99214 OFFICE O/P EST MOD 30 MIN: CPT | Mod: S$PBB,,, | Performed by: NURSE PRACTITIONER

## 2024-08-07 PROCEDURE — 1159F MED LIST DOCD IN RCRD: CPT | Mod: CPTII,,, | Performed by: NURSE PRACTITIONER

## 2024-08-07 PROCEDURE — 83970 ASSAY OF PARATHORMONE: CPT

## 2024-08-07 PROCEDURE — 99215 OFFICE O/P EST HI 40 MIN: CPT | Mod: PBBFAC | Performed by: NURSE PRACTITIONER

## 2024-08-07 PROCEDURE — 82306 VITAMIN D 25 HYDROXY: CPT

## 2024-08-07 PROCEDURE — 1160F RVW MEDS BY RX/DR IN RCRD: CPT | Mod: CPTII,,, | Performed by: NURSE PRACTITIONER

## 2024-08-20 ENCOUNTER — OFFICE VISIT (OUTPATIENT)
Dept: OPHTHALMOLOGY | Facility: CLINIC | Age: 64
End: 2024-08-20
Payer: MEDICAID

## 2024-08-20 VITALS — HEIGHT: 65 IN | WEIGHT: 127 LBS | BODY MASS INDEX: 21.16 KG/M2

## 2024-08-20 DIAGNOSIS — H02.889 MEIBOMIAN GLAND DISEASE, UNSPECIFIED LATERALITY: ICD-10-CM

## 2024-08-20 DIAGNOSIS — H01.00B BLEPHARITIS OF UPPER AND LOWER EYELIDS OF BOTH EYES, UNSPECIFIED TYPE: Primary | ICD-10-CM

## 2024-08-20 DIAGNOSIS — H57.02 PHYSIOLOGIC ANISOCORIA: ICD-10-CM

## 2024-08-20 DIAGNOSIS — H01.00A BLEPHARITIS OF UPPER AND LOWER EYELIDS OF BOTH EYES, UNSPECIFIED TYPE: Primary | ICD-10-CM

## 2024-08-20 DIAGNOSIS — H50.112 EXOTROPIA OF LEFT EYE: ICD-10-CM

## 2024-08-20 DIAGNOSIS — H04.129 DRY EYE: ICD-10-CM

## 2024-08-20 PROCEDURE — 99214 OFFICE O/P EST MOD 30 MIN: CPT | Mod: PBBFAC,PN

## 2024-08-20 NOTE — PROGRESS NOTES
Assessment /Plan     OCT mac 9/5/23  252//253: normal FC OU no SRF or IRF. WNL    OCT RNFL 9/5/23  84//85: All green OU     1. Meibomian gland dysfunction (MGD) of both eyes with mild blepharitis  2. Allergic conjunctivitis of both eyes  - Schirmer's without anesthesia 10mm // 6mm at previous visits again on 9/29/15: 9mm // 8mm  - LS/WC - blepharitis sheet given   - Chronic history of seasonal allergies, c/o itching/burning of eyes  - using 0.6mm punctal plugs but pt unsure if helped last time 10/2023  - 1/30/24: will start restasis bid, increased pfats to 4-6x daily(using tid)   - reassured pt that stye is resolving KAREEM, some lash loss but pt likely pulled out with aggressive washing, no chalazion or remnant stye seen on exam. Cont wcs, no rubbing of lids  - 08/20/24: Presents for K/ Lid check. Using refresh pm, Ketotifen Eye drops ([place]). Improvement in symptoms although still present. Occasionally using Vaseline for ointment. Discussed with patient starting Lacrilube as safer alternative. Cont wcs, no rubbing of lids      3. History bilateral nongranulomatous uveitis  - Lab NDIAYE neg in past (neg RPR, ACE, HLA B27, CBC, EVELYN, CXR)  - Pt with Raynauds  - Saw rheumatology; per patient testing has been wnl and has yearly f/u with rheum  - Reports hx of multiple episodes of zoster infections. Could be underlying cause. Consider lab testing if uveitis reoccurs  - Quiet off of PF since July 2015  - 08/20/24: No evidence of recurrence. Continue to monitor off of gtts     4. Flick XT OS  - intermittent diplopia; notes usually vertical; sometimes horizontal   - ortho primary  - ctm    5. Physiologic Anisocoria  - seen prior to dilation 6/30/22; minimal asymmetry <1mm; equal dark and light  - no RAPD  - EOM full  - Longstanding    RTC 9 months Annual / DFE

## 2024-08-20 NOTE — LETTER
[facility]  [address] [place] [address]  Phone: [phone] August 20, 2024     Patient: Araceli Cosby   YOB: 1960   Date of Visit: 8/20/2024       To Whom It May Concern:    Araceli Cosby requires multiple OTC eye drops for treatment of her eye condition.    If you have any questions or concerns, please don't hesitate to contact my office.    Sincerely,        Jorje Santos MD

## 2024-08-27 ENCOUNTER — HOSPITAL ENCOUNTER (OUTPATIENT)
Dept: RADIOLOGY | Facility: HOSPITAL | Age: 64
Discharge: HOME | End: 2024-08-27
Attending: NURSE PRACTITIONER
Payer: MEDICAID

## 2024-08-27 DIAGNOSIS — E04.2 MULTINODULAR GOITER: ICD-10-CM

## 2024-08-27 PROCEDURE — 76536 US EXAM OF HEAD AND NECK: CPT | Mod: TC

## 2024-12-04 ENCOUNTER — OFFICE VISIT (OUTPATIENT)
Dept: GYNECOLOGY | Facility: CLINIC | Age: 64
End: 2024-12-04
Payer: MEDICAID

## 2024-12-04 VITALS
HEIGHT: 65 IN | OXYGEN SATURATION: 97 % | SYSTOLIC BLOOD PRESSURE: 134 MMHG | RESPIRATION RATE: 18 BRPM | BODY MASS INDEX: 22.8 KG/M2 | TEMPERATURE: 98 F | DIASTOLIC BLOOD PRESSURE: 87 MMHG | HEART RATE: 85 BPM | WEIGHT: 136.81 LBS

## 2024-12-04 DIAGNOSIS — Z01.419 WOMEN'S ANNUAL ROUTINE GYNECOLOGICAL EXAMINATION: Primary | ICD-10-CM

## 2024-12-04 DIAGNOSIS — N95.2 ATROPHIC VAGINITIS: ICD-10-CM

## 2024-12-04 PROCEDURE — 1160F RVW MEDS BY RX/DR IN RCRD: CPT | Mod: CPTII,,, | Performed by: NURSE PRACTITIONER

## 2024-12-04 PROCEDURE — 1159F MED LIST DOCD IN RCRD: CPT | Mod: CPTII,,, | Performed by: NURSE PRACTITIONER

## 2024-12-04 PROCEDURE — 99213 OFFICE O/P EST LOW 20 MIN: CPT | Mod: PBBFAC | Performed by: NURSE PRACTITIONER

## 2024-12-04 PROCEDURE — 99396 PREV VISIT EST AGE 40-64: CPT | Mod: S$PBB,,, | Performed by: NURSE PRACTITIONER

## 2024-12-04 PROCEDURE — 3079F DIAST BP 80-89 MM HG: CPT | Mod: CPTII,,, | Performed by: NURSE PRACTITIONER

## 2024-12-04 PROCEDURE — 3008F BODY MASS INDEX DOCD: CPT | Mod: CPTII,,, | Performed by: NURSE PRACTITIONER

## 2024-12-04 PROCEDURE — 3075F SYST BP GE 130 - 139MM HG: CPT | Mod: CPTII,,, | Performed by: NURSE PRACTITIONER

## 2024-12-04 RX ORDER — ESTRADIOL 0.1 MG/G
CREAM VAGINAL
Qty: 30 G | Refills: 5 | Status: SHIPPED | OUTPATIENT
Start: 2024-12-04

## 2024-12-04 NOTE — PROGRESS NOTES
Patient ID: Araceli Cosby is a 64 y.o. female.    Chief Complaint: Gynecologic Exam      Review of patient's allergies indicates:   Allergen Reactions    Amlodipine Swelling    Cardizem [diltiazem hcl] Swelling    Iodinated contrast media Rash    Metoprolol Shortness Of Breath    Trelegy ellipta [fluticasone-umeclidin-vilanter] Other (See Comments)    Acyclovir analogues Other (See Comments)     Headache    Azithromycin Other (See Comments)     Hard on stomach    Cephalexin Other (See Comments)    Gabapentin      Other reaction(s): GI Upset, Headache    Hydromorphone     Ibuprofen-famotidine      Other reaction(s): GI Upset    Nsaids (non-steroidal anti-inflammatory drug)     Paroxetine Other (See Comments)     Other reaction(s): Cognitive Disruption    Serotonin 5ht-3 antagonists     Tramadol Other (See Comments)     nervousness    Wellbutrin [bupropion hcl] Other (See Comments)     headache    Ciprofloxacin Nausea And Vomiting     Other reaction(s): GI Upset, Headache    Codeine Rash    Cyclobenzaprine Rash    Doxycycline Rash    Iodine and iodide containing products Rash    Paxil [paroxetine hcl] Anxiety    Sulfa (sulfonamide antibiotics) Rash         Past Medical History:   Diagnosis Date    Allergies     Fibromyalgia     GERD (gastroesophageal reflux disease)     Goiter     Hyperparathyroidism     Interstitial cystitis     Mitral valve prolapse     Muscle disease     Osteoarthritis     Osteoporosis     Skin cancer             HPI:  The patient is G0 here for annual gyn exam.Denies hx of abnormal pap. Reports hx of hysterectomy in her late 20s/early 30s for CPP/suspected endometriosis. Reports also had bilateral oophorectomy for recurrent ovarian cysts. Pt states attempted to take HRT (multiple formulations)but could not tolerate any.She has known osteoporosis which is managed by her pcp, Dr. Cueto. States is not currently on medications for this as they are managing her hyperparathyroidism first. Pt  " is UTD on mammogram. This is typically ordered by her pcp and she gets her imaging at [facility].. Pt states is not sexually active. Denies vaginal discharge. She does report dryness and feels she has a prolapse as she feels pressure. Pt has known atrophic vaginitis. We discussed vaginal estrace at her last visit but she declined. She is amendable today. She has hx of IC/ incomplete bladder emptying/urgency and is followed in urology.    is established with outside GI and is UTD on colonoscopy. Denies fly hx of breast/ovarian/uterine/colon cancer.   Review of Systems:   Negative except for findings in HPI     Objective:   /87   Pulse 85   Temp 98.2 °F (36.8 °C) (Oral)   Resp 18   Ht 5' 5" (1.651 m)   Wt 62.1 kg (136 lb 12.8 oz)   SpO2 97%   BMI 22.76 kg/m²    Physical Exam:  GENERAL: Pt is aware and alert and  in no acute distress.  BREASTS: Bilateral-No masses, nipple discharge, skin changes, or tenderness.  ABDOMEN: Soft, non tender.  VULVA:  No lesions or skin changes.  URETHRA: No lesions  BLADDER: No tenderness.  VAGINA: Mucosa atrophic,no discharge; no lesions; very mild vault prolapse; narrow introitus  CERVIX:  absent  BIMANUAL EXAM: . The uterus is absent. Paul adnexa reveal no evidence of masses; no fullness   SKIN: Warm and Dry  PSYCHIATRIC: Patient is awake and alert. Mood and affect are normal.    Assessment:   Women's annual routine gynecological examination    Atrophic vaginitis    Other orders  -     estradioL (ESTRACE) 0.01 % (0.1 mg/gram) vaginal cream; Insert 1g vaginally every night for 2 weeks then twice per week  Dispense: 30 g; Refill: 5            1. Women's annual routine gynecological examination    2. Atrophic vaginitis             -pap not indicated secondary to hyst for benign conditions  -educated pt on proper use of estrace vaginal cream; notify clinic if no improvement  -MA to obtain mammogram records   Plan:       Follow up in about 1 year (around " 12/4/2025).

## 2024-12-09 ENCOUNTER — OFFICE VISIT (OUTPATIENT)
Dept: UROLOGY | Facility: CLINIC | Age: 64
End: 2024-12-09
Payer: MEDICAID

## 2024-12-09 VITALS
HEIGHT: 65 IN | RESPIRATION RATE: 20 BRPM | OXYGEN SATURATION: 100 % | HEART RATE: 77 BPM | BODY MASS INDEX: 21.29 KG/M2 | WEIGHT: 127.81 LBS | TEMPERATURE: 98 F | DIASTOLIC BLOOD PRESSURE: 75 MMHG | SYSTOLIC BLOOD PRESSURE: 119 MMHG

## 2024-12-09 DIAGNOSIS — E21.3 HYPERPARATHYROIDISM: ICD-10-CM

## 2024-12-09 DIAGNOSIS — N30.10 INTERSTITIAL CYSTITIS: Primary | ICD-10-CM

## 2024-12-09 LAB
BILIRUB SERPL-MCNC: NEGATIVE MG/DL
BLOOD URINE, POC: NEGATIVE
COLOR, POC UA: YELLOW
GLUCOSE UR QL STRIP: 100
KETONES UR QL STRIP: NEGATIVE
LEUKOCYTE ESTERASE URINE, POC: NEGATIVE
NITRITE, POC UA: NEGATIVE
PH, POC UA: 6
PROTEIN, POC: NEGATIVE
SPECIFIC GRAVITY, POC UA: 1.02
UROBILINOGEN, POC UA: 0.2

## 2024-12-09 PROCEDURE — 1160F RVW MEDS BY RX/DR IN RCRD: CPT | Mod: CPTII,,, | Performed by: UROLOGY

## 2024-12-09 PROCEDURE — 3078F DIAST BP <80 MM HG: CPT | Mod: CPTII,,, | Performed by: UROLOGY

## 2024-12-09 PROCEDURE — 99215 OFFICE O/P EST HI 40 MIN: CPT | Mod: PBBFAC | Performed by: UROLOGY

## 2024-12-09 PROCEDURE — 3008F BODY MASS INDEX DOCD: CPT | Mod: CPTII,,, | Performed by: UROLOGY

## 2024-12-09 PROCEDURE — 99213 OFFICE O/P EST LOW 20 MIN: CPT | Mod: S$PBB,,, | Performed by: UROLOGY

## 2024-12-09 PROCEDURE — 1159F MED LIST DOCD IN RCRD: CPT | Mod: CPTII,,, | Performed by: UROLOGY

## 2024-12-09 PROCEDURE — 81001 URINALYSIS AUTO W/SCOPE: CPT | Mod: PBBFAC | Performed by: UROLOGY

## 2024-12-09 PROCEDURE — 3074F SYST BP LT 130 MM HG: CPT | Mod: CPTII,,, | Performed by: UROLOGY

## 2024-12-09 RX ORDER — POLYETHYLENE GLYCOL 3350 17 G/17G
POWDER, FOR SOLUTION ORAL
COMMUNITY

## 2024-12-09 RX ORDER — FLECAINIDE ACETATE 50 MG/1
TABLET ORAL
COMMUNITY

## 2024-12-09 NOTE — PROGRESS NOTES
CC:    Six month    HPI:  Araceli Cosby is a 64 y.o. female here for follow-up.  She was diagnosed with IC in the late 1980's or early 1990's.  She had DMSO done in 1999 but she had a lot of bladder spasms with that so she stopped taking it.  She tries to follow an IC diet.  She recently had pelvic physical therapy for fractures and it helped her with the bladder. She had some instillations but she thinks they were worsening her fibromyalgia because she was more fatigued.  She ate something recently that caused a flare but that has calmed down now.   She saw GYN and was given estradiol cream.  I reviewed the note from HUMPHREY Mendiola dated 4 December 2024.   She was recently diagnosed with hyperparthyroidism.  She is under observation for this.  She denies any history of kidney stones.  She complains of urgency and urge incontinence.  She denies stress urinary incontinence.      Urinalysis:    Results for orders placed or performed in visit on 12/09/24   POCT URINE DIPSTICK WITH MICROSCOPE, AUTOMATED   Result Value Ref Range    Color, UA Yellow     Spec Grav UA 1.020     pH, UA 6.0     WBC, UA Negative     Nitrite, UA Negative     Protein, POC Negative     Glucose,      Ketones, UA Negative     Urobilinogen, UA 0.2     Bilirubin, POC Negative     Blood, UA Negative      Microscopic Urinalysis:  WBC:   None per HPF     RBC:    None per HPF     Bacteria:    None per HPF     Squamous epithelial cells:  None per HPF      Crystals:   None    ROS:  All systems reviewed and are negative except as documented in HPI and/or Assessment and Plan.     Patient Active Problem List:     Patient Active Problem List   Diagnosis    Meibomian gland disease    Blepharitis of upper and lower eyelids of both eyes    Age related osteoporosis    Trigger finger, left index finger    Carpal tunnel syndrome on left    Interstitial cystitis    Chronic pelvic pain in female    Encounter for pacemaker at end of battery life         Past Medical History:  Past Medical History:   Diagnosis Date    Allergies     Fibromyalgia     GERD (gastroesophageal reflux disease)     Goiter     Hyperparathyroidism     Interstitial cystitis     Mitral valve prolapse     Muscle disease     Osteoarthritis     Osteoporosis     Skin cancer         Past Surgical History:  Past Surgical History:   Procedure Laterality Date    APPENDECTOMY      CARPAL TUNNEL RELEASE Right     FINGER SURGERY N/A     left thumb    HYSTERECTOMY      pace maker      REPLACEMENT OF PACEMAKER GENERATOR N/A 10/12/2023    Procedure: REPLACEMENT, PACEMAKER GENERATOR;  Surgeon: Austin Willis MD;  Location: [facility] CATH LAB;  Service: Cardiology;  Laterality: N/A;  PPM GEN CHANGE (BS) MAY NEED TEMP PACING    RIB FRACTURE SURGERY Right     1st rib and extra rib removed        Family History:  Family History   Problem Relation Name Age of Onset    Arthritis Mother      No Known Problems Father      No Known Problems Sister      No Known Problems Brother      No Known Problems Maternal Aunt      No Known Problems Maternal Uncle      No Known Problems Paternal Aunt      No Known Problems Paternal Uncle      No Known Problems Maternal Grandmother      No Known Problems Maternal Grandfather      No Known Problems Paternal Grandmother      No Known Problems Paternal Grandfather      Aneurysm Neg Hx      Cancer Neg Hx      Clotting disorder Neg Hx      Dementia Neg Hx      Diabetes Neg Hx      Fainting Neg Hx      Heart disease Neg Hx      Hyperlipidemia Neg Hx      Kidney disease Neg Hx      Liver disease Neg Hx      Migraines Neg Hx      Neuropathy Neg Hx      Obesity Neg Hx      Parkinsonism Neg Hx      Seizures Neg Hx      Stroke Neg Hx      Tremor Neg Hx          Social History:  Social History     Socioeconomic History    Marital status: Single   Tobacco Use    Smoking status: Former     Current packs/day: 0.00     Types: Cigarettes     Quit date: 2/15/1983     Years since quittin.8      Passive exposure: Past    Smokeless tobacco: Former     Quit date: 1983   Substance and Sexual Activity    Alcohol use: Not Currently    Drug use: Never    Sexual activity: Not Currently        Allergies:  Review of patient's allergies indicates:   Allergen Reactions    Amlodipine Swelling    Cardizem [diltiazem hcl] Swelling    Iodinated contrast media Rash    Metoprolol Shortness Of Breath    Trelegy ellipta [fluticasone-umeclidin-vilanter] Other (See Comments)    Acyclovir analogues Other (See Comments)     Headache    Azithromycin Other (See Comments)     Hard on stomach    Cephalexin Other (See Comments)    Gabapentin      Other reaction(s): GI Upset, Headache    Hydromorphone     Ibuprofen-famotidine      Other reaction(s): GI Upset    Nsaids (non-steroidal anti-inflammatory drug)     Paroxetine Other (See Comments)     Other reaction(s): Cognitive Disruption    Serotonin 5ht-3 antagonists     Tramadol Other (See Comments)     nervousness    Wellbutrin [bupropion hcl] Other (See Comments)     headache    Ciprofloxacin Nausea And Vomiting     Other reaction(s): GI Upset, Headache    Codeine Rash    Cyclobenzaprine Rash    Doxycycline Rash    Iodine and iodide containing products Rash    Paxil [paroxetine hcl] Anxiety    Sulfa (sulfonamide antibiotics) Rash        Objective:  Vitals:    12/09/24 0959   BP: 119/75   Pulse: 77   Resp: 20   Temp: 97.9 °F (36.6 °C)     General:  Well developed, well nourished adult female in no acute distress  Abdomen: Soft, nontender, no masses  Extremities:  No clubbing, cyanosis, or edema  Neurologic:  Grossly intact  Musculoskeletal:  Normal tone    Assessment:  1. Interstitial cystitis  - POCT URINE DIPSTICK WITH MICROSCOPE, AUTOMATED    2. Hyperparathyroidism     Plan:  She will continue to manage the IC conservatively.    She is continuing to follow this with her primary care.      Follow-up tests needed:   None.      Return appointment:  One year and prn.

## 2025-02-10 ENCOUNTER — OFFICE VISIT (OUTPATIENT)
Dept: ENDOCRINOLOGY | Facility: CLINIC | Age: 65
End: 2025-02-10
Payer: MEDICAID

## 2025-02-10 ENCOUNTER — LAB VISIT (OUTPATIENT)
Dept: LAB | Facility: HOSPITAL | Age: 65
End: 2025-02-10
Attending: NURSE PRACTITIONER
Payer: MEDICAID

## 2025-02-10 VITALS
SYSTOLIC BLOOD PRESSURE: 148 MMHG | TEMPERATURE: 98 F | HEART RATE: 84 BPM | WEIGHT: 134.94 LBS | HEIGHT: 65 IN | BODY MASS INDEX: 22.48 KG/M2 | RESPIRATION RATE: 18 BRPM | DIASTOLIC BLOOD PRESSURE: 86 MMHG

## 2025-02-10 DIAGNOSIS — E21.3 HYPERPARATHYROIDISM: ICD-10-CM

## 2025-02-10 DIAGNOSIS — M48.56XG NON-TRAUMATIC COMPRESSION FRACTURE OF L1 LUMBAR VERTEBRA WITH DELAYED HEALING, SUBSEQUENT ENCOUNTER: ICD-10-CM

## 2025-02-10 DIAGNOSIS — E04.2 MULTINODULAR GOITER: Primary | ICD-10-CM

## 2025-02-10 DIAGNOSIS — E04.2 MULTINODULAR GOITER: ICD-10-CM

## 2025-02-10 DIAGNOSIS — M81.0 OSTEOPOROSIS, UNSPECIFIED OSTEOPOROSIS TYPE, UNSPECIFIED PATHOLOGICAL FRACTURE PRESENCE: ICD-10-CM

## 2025-02-10 LAB
25(OH)D3+25(OH)D2 SERPL-MCNC: 40 NG/ML (ref 30–80)
ALBUMIN SERPL-MCNC: 4.1 G/DL (ref 3.4–4.8)
BUN SERPL-MCNC: 10.2 MG/DL (ref 9.8–20.1)
CALCIUM SERPL-MCNC: 10 MG/DL (ref 8.4–10.2)
CHLORIDE SERPL-SCNC: 106 MMOL/L (ref 98–107)
CO2 SERPL-SCNC: 27 MMOL/L (ref 23–31)
CREAT SERPL-MCNC: 0.64 MG/DL (ref 0.55–1.02)
GFR SERPLBLD CREATININE-BSD FMLA CKD-EPI: >60 ML/MIN/1.73/M2
GLUCOSE SERPL-MCNC: 106 MG/DL (ref 82–115)
PHOSPHATE SERPL-MCNC: 3.3 MG/DL (ref 2.3–4.7)
POTASSIUM SERPL-SCNC: 4.6 MMOL/L (ref 3.5–5.1)
PTH-INTACT SERPL-MCNC: 129.2 PG/ML (ref 8.7–77)
SODIUM SERPL-SCNC: 138 MMOL/L (ref 136–145)
T4 FREE SERPL-MCNC: 1.11 NG/DL (ref 0.7–1.48)
TSH SERPL-ACNC: 1 UIU/ML (ref 0.35–4.94)

## 2025-02-10 PROCEDURE — 1159F MED LIST DOCD IN RCRD: CPT | Mod: CPTII,,, | Performed by: NURSE PRACTITIONER

## 2025-02-10 PROCEDURE — 80069 RENAL FUNCTION PANEL: CPT

## 2025-02-10 PROCEDURE — 84439 ASSAY OF FREE THYROXINE: CPT

## 2025-02-10 PROCEDURE — 83970 ASSAY OF PARATHORMONE: CPT

## 2025-02-10 PROCEDURE — 3077F SYST BP >= 140 MM HG: CPT | Mod: CPTII,,, | Performed by: NURSE PRACTITIONER

## 2025-02-10 PROCEDURE — 84443 ASSAY THYROID STIM HORMONE: CPT

## 2025-02-10 PROCEDURE — 82306 VITAMIN D 25 HYDROXY: CPT

## 2025-02-10 PROCEDURE — 3008F BODY MASS INDEX DOCD: CPT | Mod: CPTII,,, | Performed by: NURSE PRACTITIONER

## 2025-02-10 PROCEDURE — 99215 OFFICE O/P EST HI 40 MIN: CPT | Mod: S$PBB,,, | Performed by: NURSE PRACTITIONER

## 2025-02-10 PROCEDURE — 36415 COLL VENOUS BLD VENIPUNCTURE: CPT

## 2025-02-10 PROCEDURE — 3079F DIAST BP 80-89 MM HG: CPT | Mod: CPTII,,, | Performed by: NURSE PRACTITIONER

## 2025-02-10 PROCEDURE — 1160F RVW MEDS BY RX/DR IN RCRD: CPT | Mod: CPTII,,, | Performed by: NURSE PRACTITIONER

## 2025-02-10 PROCEDURE — 99215 OFFICE O/P EST HI 40 MIN: CPT | Mod: PBBFAC | Performed by: NURSE PRACTITIONER

## 2025-02-10 NOTE — PROGRESS NOTES
Subjective     Patient ID: Araceli Cosby is a 65 y.o. female.    Chief Complaint: Goiter    Endocrine clinic note 02/15/2023: 63 year old female her today for Endocrine clinic F/U for nontoxic multinodular goiter, and osteoporosis.  Patient had ultrasound on 09/16/2022 There is a cystic lesion seen in the right thyroid in the upper pole that measures 3 mm x 3 mm.  There is a mix attenuation nodule in the midpole that measures 4 mm x 4 mm.  There is another cystic area seen in the lower pole that measures 4 mm by 4 mm.  There are multiple areas of nodularity seen in the left thyroid.  Largest nodule measures 6 mm x 5 mm and is seen in the midpole.  Nodules do not meet size for criteria repeat 2 year.  Follow-up ultrasound  no nodules are palpated on exam today.  Previous thyroid functions within normal limits TSH 0.5092, free T4 1.35, free T3 2.98.  Osteoporosis patient had previous T11 fracture.  Note to chart indicates the patient has had multiple lumbar and pelvic fractures.  Discussed patient starting Prolia patient is currently undergoing dental work will complete her dental work and will research on Prolia to make an informed decision on starting.     Endocrine clinic note 08/04/2023:  63-year-old female scheduled today for endocrine clinic follow-up history of nontoxic multinodular goiter and osteoporosis. Multinodular goiter Ultrasound 09/21/2022, TSH 0.5092, free T4 1.35, free T3 2.98. Largest nodule 6 mm x 5 mm seen in midpole  Multi nodules none meeting criteria for FNA/ I will repeat follow-up ultrasound 2 years.  Hyperparathyroidism Previous Calcium level 10.5  Symptomatic patient reports she has tingling around her mouth and also at times feet. Ultrasound 09/21/2022 no parathyroid adenoma seen  NM parathyroid 02/16/2023 no adenoma seen.  Previously CT 4D parathyroid not ran due to patient has iodine allergy listed as a high reactive radiology not comfortable was completing CT at that time.   Osteoporosis patient is seen specialist from [facility] she was supposed to be started on Evenity and had not received orders to go infusion clinic to start.  She states she was wanting to see a new dentist and has been x-rays 1st and she has a fractured tooth to correct before.  Instruct her she is to contact his doctor for orders to start her medication.  Patient has multiple fractures has not had any fractures since her previous visit.      Endocrine clinic note 02/07/2024:  64-year-old female scheduled today for endocrine clinic follow-up.  History of nontoxic multinodular goiter, hyperparathyroidism and osteoporosis. Multinodular goiter Ultrasound 09/21/2022  Largest nodule 6 mm x 5 mm seen in midpole  Multi nodules none meeting criteria for FNA/ I will repeat follow-up ultrasound 2 years. Hyperparathyroidism Previous Calcium level 9.8 on 08/04/2023, asymptomatic, Ultrasound 09/21/2022 no parathyroid adenoma seen, NM parathyroid 02/16/2023 no adenoma seen. .1 on 08/04/2023.  Recent calcium level normal we will repeat labs today to ensure remains normal.  Osteoporosis patient was seen previously and discuss that her PCP we will be treating her osteoporosis.      Endocrine clinic note 08/07/2024:  64-year-old female scheduled today for endocrine clinic follow-up.  History of nontoxic multinodular goiter, hyperparathyroidism and osteoporosis. Multinodular goiter Ultrasound 09/21/2022  Largest nodule 6 mm x 5 mm seen in midpole  Multi nodules none meeting criteria for FNA/ will repeat follow-up ultrasound 2 years.  Ultrasound ordered today.  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  Hyperparathyroidism  Previous Calcium level 9.4, vitamin-D 44.2, .8, magnesium level 2.10 on 02/07/2024, Asymptomatic. Ultrasound 09/21/2022 no parathyroid adenoma seen, NM parathyroid 02/16/2023 no adenoma seen. Will  Continue to monitor calcium level.  Osteoporosis patient has a not started Prolia has declined to have it done in Endocrine Clinic.  Stated at 1 point her PCP would administer PCP stated she would not administer patient declined again stating she wanted to see a dentist 1st then stating she saw a doctor in [place] for infusions.     Endocrine clinic note 02/10/2025:  65-year-old female scheduled today for endocrine clinic follow-up.  History of nontoxic multinodular goiter, hyperparathyroidism and osteoporosis. Multinodular goiter Ultrasound 09/21/2022  Largest nodule 6 mm x 5 mm seen in midpole Multi nodules none meeting criteria for FNA/ will repeat follow-up ultrasound 2 years. Repeat follow-up ultrasound on 08/27/2024 Small bilateral nodules do not meet follow-up or FNA criteria. Pt denies any palpable nodules. Hyperparathyroidism Calcium level 10.0, .2, Magnesium 2.1 on 08/07/2024, Asymptomatic. Ultrasound 09/21/2022 no parathyroid adenoma seen  NM parathyroid 02/16/2023 no adenoma seen. Continue to monitor calcium level calcium remains in normal range.  Osteoporosis previous pt Had not started Prolia (declined) , Previously reported wants dental Xray prior. Seen Dr from [place] sent Evenity to infusion clinic pt has not heard from for appt. will F/u. Instructed pt she could start treatment today she started to cry. Recent bone density repeated will request records. Pt has multiple compression fractures with chronic back pain.  Pt will complete blood work today, Recent Bone density requested. Will start Evenity once all received.           Result Note  Details        Reading Physician      Reading Date  Result Priority  Angelo Craver MD  [phone]  2/23/2023        Routine     Narrative & Impression  EXAMINATION:  NM PARATHYROID SCAN PLANAR     CLINICAL HISTORY:  Hyperparathyroidism, unspecified     TECHNIQUE:  Following injection of 25.9 99mTc sestamibi and approximately 10 minute delay, anterior  projection images of the neck and chest were obtained. After a two-hour delay, repeat anterior projection images of the neck were acquired.     COMPARISON:  None.     FINDINGS:  There is physiological tracer uptake in the myocardium, salivary glands, and thyroid gland. Delayed images demonstrate near-complete tracer washout from the thyroid.     No focal abnormal persistent uptake is present on delayed images in the region of the parathyroid glands to suggest parathyroid adenoma.     Impression:     No abnormal uptake to suggest parathyroid adenoma        Electronically signed by: Angelo Carver MD  Date:                                            02/23/2023  Time:                                           14:26           Exam Ended: 02/23/23 11:47 Last Resulted: 02/23/23 14:26                 Result Notes  Details        Reading Physician      Reading Date  Result Priority  Aaron Cardenas MD  [phone]  9/21/2022        Routine     Narrative & Impression  EXAMINATION:  US THYROID     CLINICAL HISTORY:  Nontoxic single thyroid nodule     TECHNIQUE:  Ultrasound of the thyroid and cervical lymph nodes was performed.     COMPARISON:  None.     FINDINGS:  The right thyroid measures 5.1 x 1.4 x 1.6 cm and left thyroid measures 4.5 x 1 x 1.2 cm.  There are multiple nodules seen in both thyroid lobes.  Isthmus measures 1.2 mm.  The echotexture of the thyroid is diffusely heterogeneous bilaterally.  There is a cystic lesion seen in the right thyroid in the upper pole that measures 3 mm x 3 mm.  There is a mix attenuation nodule in the midpole that measures 4 mm x 4 mm.  There is another cystic area seen in the lower pole that measures 4 mm by 4 mm.  There are multiple areas of nodularity seen in the left thyroid.  Largest nodule measures 6 mm x 5 mm and is seen in the midpole.  To both lobes appears normal.     Impression:     Multinodular goiter bilaterally        Electronically signed by: Aaron Cardenas  Date:                                             09/21/2022  Time:                                           15:02          Thyroid  Order: [id number]  Status: Final result       Visible to patient: No (inaccessible in MyChart)       Next appt: 05/22/2025 at 10:20 AM in Ophthalmology ([facility]       Dx: Multinodular goiter    2 Result Notes  Details      Reading Physician Reading Date Result Priority  Kyler Diehl MD  [phone] 8/27/2024 Routine    Narrative & Impression  EXAMINATION:  US THYROID     CLINICAL HISTORY:  MNG; Nontoxic multinodular goiter     COMPARISON:  02/15/2024     FINDINGS:  Right thyroid lobe measures 4.0 cm.  Left thyroid lobe measures 4.2 cm.  Isthmus measures 0.2 cm.     Multiple subcentimeter cystic and part cystic nodules are present.  No dominant nodules.     Impression:     Small bilateral nodules do not meet follow-up or FNA criteria        Electronically signed by:Kyler Diehl MD  Date:                                            08/27/2024  Time:                                           13:48        Exam Ended: 08/27/24 13:40 CDT Last Resulted: 08/27/24 13:48 CDT      Order Details        View Encounter        Lab and Collection Details        Routing        Result History                       Review of Systems   Constitutional:  Negative for activity change, appetite change and fatigue.   HENT:  Negative for dental problem, hearing loss, tinnitus, trouble swallowing and goiter.    Eyes:  Negative for photophobia, pain and visual disturbance.   Respiratory:  Negative for cough, chest tightness and wheezing.    Cardiovascular:  Negative for chest pain, palpitations and leg swelling.   Gastrointestinal:  Negative for abdominal pain, constipation, diarrhea, nausea and reflux.   Endocrine: Negative for cold intolerance, heat intolerance, polydipsia and polyphagia.   Genitourinary:  Negative for difficulty urinating, flank pain, hematuria, hot flashes, menstrual  irregularity, menstrual problem, nocturia and urgency.   Musculoskeletal:  Negative for back pain, gait problem, joint swelling, leg pain and joint deformity.   Integumentary:  Negative for color change, pallor, rash and breast discharge.   Allergic/Immunologic: Negative for environmental allergies, food allergies and immunocompromised state.   Neurological:  Negative for tremors, seizures, headaches, memory loss and coordination difficulties.   Psychiatric/Behavioral:  Negative for agitation, behavioral problems and sleep disturbance. The patient is not nervous/anxious.           Objective     Physical Exam  Constitutional:       General: She is not in acute distress.     Appearance: Normal appearance. She is not ill-appearing.   HENT:      Head: Normocephalic and atraumatic.      Right Ear: External ear normal.      Left Ear: External ear normal.      Nose: Nose normal. No congestion or rhinorrhea.      Mouth/Throat:      Mouth: Mucous membranes are moist.      Pharynx: Oropharynx is clear. No oropharyngeal exudate.   Eyes:      General:         Right eye: No discharge.         Left eye: No discharge.      Conjunctiva/sclera: Conjunctivae normal.      Pupils: Pupils are equal, round, and reactive to light.   Neck:      Thyroid: No thyroid mass, thyromegaly or thyroid tenderness.   Cardiovascular:      Rate and Rhythm: Normal rate and regular rhythm.      Pulses: Normal pulses.      Heart sounds: Normal heart sounds. No murmur heard.  Pulmonary:      Effort: Pulmonary effort is normal. No respiratory distress.      Breath sounds: Normal breath sounds.   Abdominal:      General: Abdomen is flat. Bowel sounds are normal. There is no distension.      Palpations: Abdomen is soft.      Tenderness: There is no abdominal tenderness.   Musculoskeletal:         General: No swelling or tenderness. Normal range of motion.      Cervical back: Normal range of motion and neck supple. No tenderness.      Right lower leg: No  edema.      Left lower leg: No edema.   Feet:      Right foot:      Skin integrity: Skin integrity normal.      Left foot:      Skin integrity: Skin integrity normal.   Lymphadenopathy:      Cervical: No cervical adenopathy.   Skin:     General: Skin is warm and dry.      Coloration: Skin is not jaundiced or pale.   Neurological:      General: No focal deficit present.      Mental Status: She is alert and oriented to person, place, and time. Mental status is at baseline.      Coordination: Coordination normal.      Gait: Gait normal.   Psychiatric:         Mood and Affect: Mood normal.         Behavior: Behavior normal.         Thought Content: Thought content normal.            Assessment and Plan         1. Multinodular goiter  TSH 0.595, free T4 1.16 on 08/04/2023   Ultrasound 09/21/2022 Largest nodule 6 mm x 5 mm seen in midpole, Multi nodules none meeting criteria for FNA  Repeat follow-up ultrasound on 08/27/2024 Small bilateral nodules do not meet follow-up or FNA criteria  Component Ref Range & Units 1 yr ago  (8/4/23) 2 yr ago  (11/22/22) 2 yr ago  (9/21/22) 7 yr ago  (7/27/17)   TSH 0.350 - 4.940 uIU/mL 0.595 0.5092 R 0.9350 R 0.529 R   -     T4, Free; Future; Expected date: 02/10/2025  -     TSH; Future; Expected date: 02/10/2025    2. Hyperparathyroidism  Calcium level 10.0, .2, Magnesium 2.1 on 08/07/2024    Asymptomatic  Ultrasound 09/21/2022 no parathyroid adenoma seen  NM parathyroid 02/16/2023 no adenoma seen  Continue to monitor calcium level           Component Ref Range & Units 6 mo ago  (8/7/24) 1 yr ago  (2/7/24) 1 yr ago  (8/4/23) 1 yr ago  (2/15/23) 2 yr ago  (11/22/22)   PARATHYROID HORMONE INTACT 8.7 - 77.0 pg/mL 216.2 High  152.8 High  154.1 High  110.3 High  112.3 High         Component Ref Range & Units 6 mo ago  (8/7/24) 1 yr ago  (2/7/24) 1 yr ago  (8/4/23) 1 yr ago  (2/15/23) 2 yr ago  (11/22/22) 7 yr ago  (6/26/17)   Sodium 136 - 145 mmol/L 137 137 137 138 140 139   Potassium  3.5 - 5.1 mmol/L 4.0 4.2 4.2 4.9 3.4 Low  3.5   Chloride 98 - 107 mmol/L 103 103 101 104 102 102   CO2 23 - 31 mmol/L 26 27 28 26 26 27.0 R   Glucose 82 - 115 mg/dL 91 90 93 75 Low  136 High  78 R   Blood Urea Nitrogen 9.8 - 20.1 mg/dL 7.5 Low  7.4 Low  5.2 Low  6.2 Low  7.7 Low  7.0 R   Creatinine 0.55 - 1.02 mg/dL 0.70 0.67 0.70 0.63 0.66 0.71   Calcium 8.4 - 10.2 mg/dL 10.0 9.4 9.8 10.5 High  10.1 9.1 R     Component Ref Range & Units 6 mo ago 1 yr ago 2 yr ago   Magnesium Level 1.60 - 2.60 mg/dL 2.10 2.10 1.80   -     Renal Function Panel; Future; Expected date: 02/10/2025  -     Vitamin D; Future; Expected date: 02/10/2025  -     PTH, Intact; Future; Expected date: 02/10/2025    3. Osteoporosis, unspecified osteoporosis type, unspecified pathological fracture presence  Had not started Prolia (declined)   Previously reported wants dental Xray prior   Making appt. W/ dentist  (not reported)   Seen  from [place] sent Evenity to infusion clinic pt has not heard from for appt. will F/u   Request bone density, labs today then schedule for Evenity            Component Ref Range & Units 6 mo ago  (8/7/24) 1 yr ago  (2/7/24) 1 yr ago  (8/4/23) 1 yr ago  (2/15/23) 2 yr ago  (11/22/22)   Vitamin D 30 - 80 ng/mL 45 44.2 R 53.3 R 68.9 R 90.2 High  R          4. Non-traumatic compression fracture of L1 lumbar vertebra with delayed healing, subsequent encounter  CT 03/01/2024  There are age-indeterminate, chronic appearing compression fractures of T10, T11, T12, L1, L3, L4, L5 vertebral bodies. Mild compression fracture of superior endplate of L2 also noted. No osseous destruction seen. No erosions seen       Follow up in about 4 months (around 6/10/2025) for Multinodular Goiter, Hyperparathyroidism, Osteoporosis.      I spent a total of 40 minutes on the day of the visit.  This includes face to face time and non-face to face time preparing to see the patient (eg, review of tests), obtaining and/or reviewing separately  obtained history, documenting clinical information in the electronic or other health record, independently interpreting results and communicating results to the patient/family/caregiver, or care coordinator.   Total 55 minutes spent discussing osteoporosis treatment

## 2025-02-17 ENCOUNTER — RESULTS FOLLOW-UP (OUTPATIENT)
Dept: ENDOCRINOLOGY | Facility: CLINIC | Age: 65
End: 2025-02-17

## 2025-02-17 ENCOUNTER — TELEPHONE (OUTPATIENT)
Dept: ENDOCRINOLOGY | Facility: CLINIC | Age: 65
End: 2025-02-17
Payer: MEDICAID

## 2025-02-17 NOTE — TELEPHONE ENCOUNTER
Phoned pt to get doctor office contact information to request records for Bone Density and a CT scan of Spine. No answer. Message left to return call.

## 2025-02-18 NOTE — TELEPHONE ENCOUNTER
----- Message from Sol sent at 2/18/2025  9:32 AM CST -----  Regarding: NM Call Back  Patient of Todd called into clinic, returning a missed call.  Patient would like a call back.[phone]

## 2025-03-20 ENCOUNTER — OFFICE VISIT (OUTPATIENT)
Dept: ENDOCRINOLOGY | Facility: CLINIC | Age: 65
End: 2025-03-20
Payer: MEDICAID

## 2025-03-20 VITALS
SYSTOLIC BLOOD PRESSURE: 132 MMHG | HEIGHT: 65 IN | BODY MASS INDEX: 21.67 KG/M2 | TEMPERATURE: 98 F | RESPIRATION RATE: 12 BRPM | HEART RATE: 74 BPM | WEIGHT: 130.06 LBS | DIASTOLIC BLOOD PRESSURE: 81 MMHG

## 2025-03-20 DIAGNOSIS — M48.56XG NON-TRAUMATIC COMPRESSION FRACTURE OF L1 LUMBAR VERTEBRA WITH DELAYED HEALING, SUBSEQUENT ENCOUNTER: ICD-10-CM

## 2025-03-20 DIAGNOSIS — M81.0 OSTEOPOROSIS, UNSPECIFIED OSTEOPOROSIS TYPE, UNSPECIFIED PATHOLOGICAL FRACTURE PRESENCE: Primary | ICD-10-CM

## 2025-03-20 PROCEDURE — 99215 OFFICE O/P EST HI 40 MIN: CPT | Mod: PBBFAC | Performed by: NURSE PRACTITIONER

## 2025-03-20 PROCEDURE — 99214 OFFICE O/P EST MOD 30 MIN: CPT | Mod: S$PBB,,, | Performed by: NURSE PRACTITIONER

## 2025-03-20 PROCEDURE — 1160F RVW MEDS BY RX/DR IN RCRD: CPT | Mod: CPTII,,, | Performed by: NURSE PRACTITIONER

## 2025-03-20 PROCEDURE — 3075F SYST BP GE 130 - 139MM HG: CPT | Mod: CPTII,,, | Performed by: NURSE PRACTITIONER

## 2025-03-20 PROCEDURE — 1159F MED LIST DOCD IN RCRD: CPT | Mod: CPTII,,, | Performed by: NURSE PRACTITIONER

## 2025-03-20 PROCEDURE — 3079F DIAST BP 80-89 MM HG: CPT | Mod: CPTII,,, | Performed by: NURSE PRACTITIONER

## 2025-03-20 PROCEDURE — 3008F BODY MASS INDEX DOCD: CPT | Mod: CPTII,,, | Performed by: NURSE PRACTITIONER

## 2025-03-20 RX ORDER — CHLORZOXAZONE 250 MG/1
TABLET ORAL
COMMUNITY

## 2025-03-20 RX ORDER — ZINC GLUCONATE 100 MG
TABLET ORAL
COMMUNITY

## 2025-03-20 RX ORDER — SODIUM,POTASSIUM PHOSPHATES 280-250MG
1 POWDER IN PACKET (EA) ORAL ONCE
COMMUNITY

## 2025-03-20 NOTE — PROGRESS NOTES
Subjective     Patient ID: Araceli Cosby is a 65 y.o. female.    Chief Complaint: Follow-up (Multinodular goiter and osteoporosis )    Endocrine clinic note 02/15/2023: 63 year old female her today for Endocrine clinic F/U for nontoxic multinodular goiter, and osteoporosis.  Patient had ultrasound on 09/16/2022 There is a cystic lesion seen in the right thyroid in the upper pole that measures 3 mm x 3 mm.  There is a mix attenuation nodule in the midpole that measures 4 mm x 4 mm.  There is another cystic area seen in the lower pole that measures 4 mm by 4 mm.  There are multiple areas of nodularity seen in the left thyroid.  Largest nodule measures 6 mm x 5 mm and is seen in the midpole.  Nodules do not meet size for criteria repeat 2 year.  Follow-up ultrasound  no nodules are palpated on exam today.  Previous thyroid functions within normal limits TSH 0.5092, free T4 1.35, free T3 2.98.  Osteoporosis patient had previous T11 fracture.  Note to chart indicates the patient has had multiple lumbar and pelvic fractures.  Discussed patient starting Prolia patient is currently undergoing dental work will complete her dental work and will research on Prolia to make an informed decision on starting.     Endocrine clinic note 08/04/2023:  63-year-old female scheduled today for endocrine clinic follow-up history of nontoxic multinodular goiter and osteoporosis. Multinodular goiter Ultrasound 09/21/2022, TSH 0.5092, free T4 1.35, free T3 2.98. Largest nodule 6 mm x 5 mm seen in midpole  Multi nodules none meeting criteria for FNA/ I will repeat follow-up ultrasound 2 years.  Hyperparathyroidism Previous Calcium level 10.5  Symptomatic patient reports she has tingling around her mouth and also at times feet. Ultrasound 09/21/2022 no parathyroid adenoma seen  NM parathyroid 02/16/2023 no adenoma seen.  Previously CT 4D parathyroid not ran due to patient has iodine allergy listed as a high reactive radiology not  comfortable was completing CT at that time.  Osteoporosis patient is seen specialist from [facility] she was supposed to be started on Evenity and had not received orders to go infusion clinic to start.  She states she was wanting to see a new dentist and has been x-rays 1st and she has a fractured tooth to correct before.  Instruct her she is to contact his doctor for orders to start her medication.  Patient has multiple fractures has not had any fractures since her previous visit.      Endocrine clinic note 02/07/2024:  64-year-old female scheduled today for endocrine clinic follow-up.  History of nontoxic multinodular goiter, hyperparathyroidism and osteoporosis. Multinodular goiter Ultrasound 09/21/2022  Largest nodule 6 mm x 5 mm seen in midpole  Multi nodules none meeting criteria for FNA/ I will repeat follow-up ultrasound 2 years. Hyperparathyroidism Previous Calcium level 9.8 on 08/04/2023, asymptomatic, Ultrasound 09/21/2022 no parathyroid adenoma seen, NM parathyroid 02/16/2023 no adenoma seen. .1 on 08/04/2023.  Recent calcium level normal we will repeat labs today to ensure remains normal.  Osteoporosis patient was seen previously and discuss that her PCP we will be treating her osteoporosis.      Endocrine clinic note 08/07/2024:  64-year-old female scheduled today for endocrine clinic follow-up.  History of nontoxic multinodular goiter, hyperparathyroidism and osteoporosis. Multinodular goiter Ultrasound 09/21/2022  Largest nodule 6 mm x 5 mm seen in midpole  Multi nodules none meeting criteria for FNA/ will repeat follow-up ultrasound 2 years.  Ultrasound ordered today.  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  Hyperparathyroidism  Previous Calcium level 9.4, vitamin-D 44.2, .8, magnesium level 2.10 on 02/07/2024, Asymptomatic. Ultrasound 09/21/2022 no parathyroid adenoma seen, NM  parathyroid 02/16/2023 no adenoma seen. Will Continue to monitor calcium level.  Osteoporosis patient has a not started Prolia has declined to have it done in Endocrine Clinic.  Stated at 1 point her PCP would administer PCP stated she would not administer patient declined again stating she wanted to see a dentist 1st then stating she saw a doctor in [place] for infusions.      Endocrine clinic note 02/10/2025:  65-year-old female scheduled today for endocrine clinic follow-up.  History of nontoxic multinodular goiter, hyperparathyroidism and osteoporosis. Multinodular goiter Ultrasound 09/21/2022  Largest nodule 6 mm x 5 mm seen in midpole Multi nodules none meeting criteria for FNA/ will repeat follow-up ultrasound 2 years. Repeat follow-up ultrasound on 08/27/2024 Small bilateral nodules do not meet follow-up or FNA criteria. Pt denies any palpable nodules. Hyperparathyroidism Calcium level 10.0, .2, Magnesium 2.1 on 08/07/2024, Asymptomatic. Ultrasound 09/21/2022 no parathyroid adenoma seen  NM parathyroid 02/16/2023 no adenoma seen. Continue to monitor calcium level calcium remains in normal range.  Osteoporosis previous pt Had not started Prolia (declined) , Previously reported wants dental Xray prior. Seen Dr from [place] sent Evenity to infusion clinic pt has not heard from for appt. will F/u. Instructed pt she could start treatment today she started to cry. Recent bone density repeated will request records. Pt has multiple compression fractures with chronic back pain.  Pt will complete blood work today, Recent Bone density requested. Will start Evenity once all received.         Endocrine clinic note 03/20/2025:  65-year-old female scheduled today in Endocrine Clinic to start either entity.  Patient has a history of osteoporosis with multiple compression fractures previously declining treatment of Prolia.  Osteoporosis Osteoporosis Bone Density   03/03/2023  0.581 -5.0.  Lumbar Spine (L1-L4), 0.557  -3.6.  Total Left Femur  0.565 -3.5.  Total Right Femur, 0.561 -3.5.  Mean Femur.  Patient has a of multiple lumbar compression fractures and and pelvic fractures.  X-ray of the pelvis on 05/10/2022 showed healing fractures of the right superior and inferior pubic rami.  Non-traumatic compression fracture XR LUMBAR SPINE 2 OR 3 VIEWS Order: [id number]  Impression Numerous osteoporotic compression fractures with levoscoliosis. X-ray of the pelvis on 05/09/2022 Healing fractures of the right parasymphyseal superior and inferior pubic rami without significant displacement. CT of the thoracic spine 07/28/2020 IMPRESSION:  Chronic T11 compression deformity with moderate loss of vertebral  body height and minimal retropulsion.  Patient has had multiple thoracic and lumbar compression fractures and pelvic fractures.  Patient was scheduled today to start Evenity but she states that she was unable to get a jaw x-ray that she wanted prior and she states she thought her jaw was hurting but states that this morning she noticed that has a tooth and she will have to find a dentist to get a filling.  Patient deferred treatment today instruct the patient once she sees the dentist she is to call the office to rescheduled to start Evenity.       X-Ray Pelvis Complete min 3 views  Order: [id number]   Status: Final result       Next appt: 05/22/2025 at 10:20 AM in Ophthalmology ([facility], [facility])       Dx: Insufficiency fracture of pelvis with...    Test Result Released: No (inaccessible in [facility])    0 Result Notes  Details      Reading Physician Reading Date Result Priority  Yanet Justice MD  [phone]  5/10/2022 Routine    Narrative & Impression  EXAMINATION:  XR PELVIS COMPLETE MIN 3 VIEWS     CLINICAL HISTORY:  Pathological fracture, pelvis, subsequent encounter for fracture with routine healing     TECHNIQUE:  AP, inlet, outlet and frogleg lateral views of the pelvis and hips were obtained.     COMPARISON:  Report  from pelvis radiograph dated 02/09/2022 was reviewed in [facility].  Images would not load for comparison.     FINDINGS:  Healing fracture of the parasymphyseal right superior and inferior pubic rami without significant displacement.  Sacroiliac joints are symmetric.  Alignment at the hips is normal.     Impression:     Healing fractures of the right parasymphyseal superior and inferior pubic rami without significant displacement         DXA Bone Density Appendicular Skeleton  Order: [id number] - Reflex for Order [id number]   Status: Final result       Next appt: 05/22/2025 at 10:20 AM in Ophthalmology ([facility], [facility])       Dx: Hyperparathyroidism; Osteoporosis, un...    Test Result Released: No (inaccessible in [employer])    0 Result Notes  Details      Reading Physician Reading Date Result Priority  Matthew Hendrix MD  [phone] 3/3/2023 Routine    Narrative & Impression  EXAMINATION:  DEXA BONE DENSITY APPENDICULAR SKELETON     CLINICAL HISTORY:  Hyperparathyroidism, unspecified     COMPARISON:  No reference studies are available for comparison.     FINDINGS:  BMD     T-score     0.581 -5.0.  Lumbar Spine (L1-L4)     0.557 -3.6.  Total Left Femur     0.565 -3.5.  Total Right Femur     0.561 -3.5.  Mean Femur     Impression:     Osteoporosis.  Significantly increased fracture risk.        Electronically signed by:Matthew Hendrix  Date:                                            03/03/2023  Time:                                           13:33      Exam Ended: 03/03/23 13:20 CST Last Resulted: 03/03/23 13:33 CST       Result Note  Details        Reading Physician      Reading Date  Result Priority  Angelo Carver MD  [phone]  2/23/2023        Routine     Narrative & Impression  EXAMINATION:  NM PARATHYROID SCAN PLANAR     CLINICAL HISTORY:  Hyperparathyroidism, unspecified     TECHNIQUE:  Following injection of 25.9 99mTc sestamibi and approximately 10 minute delay, anterior projection images of the neck and  chest were obtained. After a two-hour delay, repeat anterior projection images of the neck were acquired.     COMPARISON:  None.     FINDINGS:  There is physiological tracer uptake in the myocardium, salivary glands, and thyroid gland. Delayed images demonstrate near-complete tracer washout from the thyroid.     No focal abnormal persistent uptake is present on delayed images in the region of the parathyroid glands to suggest parathyroid adenoma.     Impression:     No abnormal uptake to suggest parathyroid adenoma        Electronically signed by: Angelo Carver MD  Date:                                            02/23/2023  Time:                                           14:26           Exam Ended: 02/23/23 11:47 Last Resulted: 02/23/23 14:26                 Result Notes  Details        Reading Physician      Reading Date  Result Priority  Aaron Cardenas MD  [phone]  9/21/2022        Routine     Narrative & Impression  EXAMINATION:  US THYROID     CLINICAL HISTORY:  Nontoxic single thyroid nodule     TECHNIQUE:  Ultrasound of the thyroid and cervical lymph nodes was performed.     COMPARISON:  None.     FINDINGS:  The right thyroid measures 5.1 x 1.4 x 1.6 cm and left thyroid measures 4.5 x 1 x 1.2 cm.  There are multiple nodules seen in both thyroid lobes.  Isthmus measures 1.2 mm.  The echotexture of the thyroid is diffusely heterogeneous bilaterally.  There is a cystic lesion seen in the right thyroid in the upper pole that measures 3 mm x 3 mm.  There is a mix attenuation nodule in the midpole that measures 4 mm x 4 mm.  There is another cystic area seen in the lower pole that measures 4 mm by 4 mm.  There are multiple areas of nodularity seen in the left thyroid.  Largest nodule measures 6 mm x 5 mm and is seen in the midpole.  To both lobes appears normal.     Impression:     Multinodular goiter bilaterally        Electronically signed by: Aaron Cardenas  Date:                                             09/21/2022  Time:                                           15:02          Thyroid  Order: [id number]  Status: Final result       Visible to patient: No (inaccessible in MyChart)       Next appt: 05/22/2025 at 10:20 AM in Ophthalmology ([facility], [facility])       Dx: Multinodular goiter    2 Result Notes  Details        Reading PhysicianReading DateResult Priority  Kyler Diehl MD  [phone]/27/2024Routine     Narrative & Impression  EXAMINATION:  US THYROID     CLINICAL HISTORY:  MNG; Nontoxic multinodular goiter     COMPARISON:  02/15/2024     FINDINGS:  Right thyroid lobe measures 4.0 cm.  Left thyroid lobe measures 4.2 cm.  Isthmus measures 0.2 cm.     Multiple subcentimeter cystic and part cystic nodules are present.  No dominant nodules.     Impression:     Small bilateral nodules do not meet follow-up or FNA criteria        Electronically signed by:Kyler Diehl MD  Date:                                            08/27/2024  Time:                                           13:48           Exam Ended: 08/27/24 13:40 CDTLast Resulted: 08/27/24 13:48 CDT      Order Details        View Encounter        Lab and Collection Details        Routing        Result History                              Review of Systems   Constitutional:  Negative for activity change, appetite change and fatigue.   HENT:  Negative for dental problem, hearing loss, tinnitus, trouble swallowing and goiter.    Eyes:  Negative for photophobia, pain and visual disturbance.   Respiratory:  Negative for cough, chest tightness and wheezing.    Cardiovascular:  Negative for chest pain, palpitations and leg swelling.   Gastrointestinal:  Negative for abdominal pain, constipation, diarrhea, nausea and reflux.   Endocrine: Negative for cold intolerance, heat intolerance, polydipsia and polyphagia.   Genitourinary:  Negative for difficulty urinating, flank pain, hematuria, hot flashes, menstrual irregularity, menstrual problem,  nocturia and urgency.   Musculoskeletal:  Negative for back pain, gait problem, joint swelling, leg pain and joint deformity.   Integumentary:  Negative for color change, pallor, rash and breast discharge.   Allergic/Immunologic: Negative for environmental allergies, food allergies and immunocompromised state.   Neurological:  Negative for tremors, seizures, headaches, memory loss and coordination difficulties.   Psychiatric/Behavioral:  Negative for agitation, behavioral problems and sleep disturbance. The patient is not nervous/anxious.           Objective     Physical Exam  Constitutional:       General: She is not in acute distress.     Appearance: Normal appearance. She is not ill-appearing.   HENT:      Head: Normocephalic and atraumatic.      Right Ear: External ear normal.      Left Ear: External ear normal.      Nose: Nose normal. No congestion or rhinorrhea.      Mouth/Throat:      Mouth: Mucous membranes are moist.      Pharynx: Oropharynx is clear. No oropharyngeal exudate.   Eyes:      General:         Right eye: No discharge.         Left eye: No discharge.      Conjunctiva/sclera: Conjunctivae normal.      Pupils: Pupils are equal, round, and reactive to light.   Neck:      Thyroid: No thyroid mass, thyromegaly or thyroid tenderness.   Cardiovascular:      Rate and Rhythm: Normal rate and regular rhythm.      Pulses: Normal pulses.      Heart sounds: Normal heart sounds. No murmur heard.  Pulmonary:      Effort: Pulmonary effort is normal. No respiratory distress.      Breath sounds: Normal breath sounds.   Abdominal:      General: Abdomen is flat. Bowel sounds are normal. There is no distension.      Palpations: Abdomen is soft.      Tenderness: There is no abdominal tenderness.   Musculoskeletal:         General: No swelling or tenderness. Normal range of motion.      Cervical back: Normal range of motion and neck supple. No tenderness.      Right lower leg: No edema.      Left lower leg: No edema.    Feet:      Right foot:      Skin integrity: Skin integrity normal.      Left foot:      Skin integrity: Skin integrity normal.   Lymphadenopathy:      Cervical: No cervical adenopathy.   Skin:     General: Skin is warm and dry.      Coloration: Skin is not jaundiced or pale.   Neurological:      General: No focal deficit present.      Mental Status: She is alert and oriented to person, place, and time. Mental status is at baseline.      Coordination: Coordination normal.      Gait: Gait normal.   Psychiatric:         Mood and Affect: Mood normal.         Behavior: Behavior normal.         Thought Content: Thought content normal.            Assessment and Plan     1. Osteoporosis, unspecified osteoporosis type, unspecified pathological fracture presence  Osteoporosis Bone Density   03/03/2023   0.581 -5.0.  Lumbar Spine (L1-L4)  0.557 -3.6.  Total Left Femur  0.565 -3.5.  Total Right Femur  0.561 -3.5.  Mean Femur  Start Evenity today ( pt deferred states she has tooth pain)   -     romosozumab-aqqg (EVENITY) syringe kit 210 mg    2. Non-traumatic compression fracture of L1 lumbar vertebra with delayed healing, subsequent encounter  XR LUMBAR SPINE 2 OR 3 VIEWS Order: [id number]  Impression Numerous osteoporotic compression fractures with levoscoliosis.  X-ray of the pelvis on 05/09/2022 Healing fractures of the right parasymphyseal superior and inferior pubic rami without significant displacement   CT of the thoracic spine 07/28/2020 IMPRESSION:  Chronic T11 compression deformity with moderate loss of vertebral  body height and minimal retropulsion.          Follow up in about 4 months (around 7/20/2025) for Osteoporosis.    I spent a total of 30 minutes on the day of the visit.  This includes face to face time and non-face to face time preparing to see the patient (eg, review of tests), obtaining and/or reviewing separately obtained history, documenting clinical information in the electronic or other health  record, independently interpreting results and communicating results to the patient/family/caregiver, or care coordinator.

## 2025-05-22 ENCOUNTER — OFFICE VISIT (OUTPATIENT)
Dept: OPHTHALMOLOGY | Facility: CLINIC | Age: 65
End: 2025-05-22
Payer: MEDICAID

## 2025-05-22 VITALS — WEIGHT: 130.06 LBS | HEIGHT: 65 IN | BODY MASS INDEX: 21.67 KG/M2

## 2025-05-22 DIAGNOSIS — H57.02 PHYSIOLOGIC ANISOCORIA: ICD-10-CM

## 2025-05-22 DIAGNOSIS — H01.00A BLEPHARITIS OF UPPER AND LOWER EYELIDS OF BOTH EYES, UNSPECIFIED TYPE: ICD-10-CM

## 2025-05-22 DIAGNOSIS — H04.129 DRY EYE: ICD-10-CM

## 2025-05-22 DIAGNOSIS — H52.03 HYPEROPIA OF BOTH EYES WITH ASTIGMATISM AND PRESBYOPIA: ICD-10-CM

## 2025-05-22 DIAGNOSIS — Z86.69 HISTORY OF UVEITIS: ICD-10-CM

## 2025-05-22 DIAGNOSIS — H50.112 EXOTROPIA OF LEFT EYE: Primary | ICD-10-CM

## 2025-05-22 DIAGNOSIS — H02.889 MEIBOMIAN GLAND DISEASE, UNSPECIFIED LATERALITY: ICD-10-CM

## 2025-05-22 DIAGNOSIS — H52.203 HYPEROPIA OF BOTH EYES WITH ASTIGMATISM AND PRESBYOPIA: ICD-10-CM

## 2025-05-22 DIAGNOSIS — H01.00B BLEPHARITIS OF UPPER AND LOWER EYELIDS OF BOTH EYES, UNSPECIFIED TYPE: ICD-10-CM

## 2025-05-22 DIAGNOSIS — H52.4 HYPEROPIA OF BOTH EYES WITH ASTIGMATISM AND PRESBYOPIA: ICD-10-CM

## 2025-05-22 PROCEDURE — 92134 CPTRZ OPH DX IMG PST SGM RTA: CPT | Mod: PBBFAC,PN | Performed by: OPHTHALMOLOGY

## 2025-05-22 PROCEDURE — 99214 OFFICE O/P EST MOD 30 MIN: CPT | Mod: PBBFAC,PN

## 2025-05-22 PROCEDURE — 92134 CPTRZ OPH DX IMG PST SGM RTA: CPT | Mod: PBBFAC,PN

## 2025-05-22 RX ORDER — APIXABAN 5 MG/1
5 TABLET, FILM COATED ORAL 2 TIMES DAILY
COMMUNITY
Start: 2025-05-05

## 2025-05-22 NOTE — PROGRESS NOTES
[facility]    RTC 9 months Annual / DFE  Patient needs a new MRX today if possible   Last edited by Bibiana Cobb MA on 5/22/2025 10:50 AM.        TESTING    OCT mac   - 9/5/23  252//253: normal FC OU no SRF or IRF. WNL  - 5/22/25  OD: , intact foveal contour, no IRF/SRF  OS: , intact foveal contour, no IRF/SRF    OCT RNFL   - 9/5/23  84//85: All green OU   - 5/22/25  OD: G 81, all green   OS: G 84, all green    Fundus photos  - 05/22/2025   OD: media clear, retina without lesions  OS: media clear, retina without lesions    ASSESSMENT / PLAN:    1. Flick XT OS  - intermittent diplopia; notes usually vertical; sometimes horizontal   - ortho primary  - ctm    2. Physiologic Anisocoria  - seen prior to dilation 6/30/22; minimal asymmetry <1mm; equal dark and light  - no RAPD  - EOM full  - Longstanding    3. MGD/blepharitis OU - resolved  4. Allergic conjunctivitis of both eyes - resolved  - Schirmer's without anesthesia 10mm // 6mm at previous visits again on 9/29/15: 9mm // 8mm  - LS/WC - blepharitis sheet given   - Chronic history of seasonal allergies, c/o itching/burning of eyes  - using 0.6mm punctal plugs but pt unsure if helped last time 10/2023  - 1/30/24: will start restasis bid, increased pfats to 4-6x daily(using tid)   - reassured pt that stye is resolving KAREEM, some lash loss but pt likely pulled out with aggressive washing, no chalazion or remnant stye seen on exam. Cont wcs, no rubbing of lids  - 08/20/24: Presents for K/ Lid check. Using refresh pm, Ketotifen Eye drops ([place]). Improvement in symptoms although still present. Occasionally using Vaseline for ointment. Discussed with patient starting Lacrilube as safer alternative. Cont wcs, no rubbing of lids  - 5/22/25: resolved, continue PFAT prn    5. History bilateral nongranulomatous uveitis - in remission  - Lab NDIAYE neg in past (neg RPR, ACE, HLA B27, CBC, EVELYN, CXR)  - Pt with Raynauds  - Saw rheumatology; per patient testing has been wnl and  has yearly f/u with rheum  - Reports hx of multiple episodes of zoster infections. Could be underlying cause. Consider lab testing if uveitis reoccurs  - Quiet off of PF since July 2015  - 5/22/25: No evidence of recurrence. Continue to monitor off of gtts     6. Hyperopia with astigmatism and presbyopia, both eyes  - BCVA 20/20 OU  - Prescription dispensed 5/22/25    RTC 1 year DFE

## 2025-05-22 NOTE — LETTER
[facility]  [address] [place] [address]  Phone: [phone]   May 22, 2025    Araceli Cosby  [address] [place] [address]    Patient: Araceli Cosby   MR Number: [id number]   YOB: 1960   Date of Visit: 5/22/2025       To whom it may concern:    Araceli Cosby was seen in my clinic 05/22/2025. Please allow her to use her refresh eye drops, anti-allergy eye drops, and any other drops and ointment as needed at work. There is a clear and direct medical benefit to her being able to use these treatments on a structured regimen.    If you have questions, please do not hesitate to call me.     Sincerely,          Barry Contreras MD

## 2025-07-30 DIAGNOSIS — M25.531 RIGHT WRIST PAIN: Primary | ICD-10-CM

## 2025-08-04 ENCOUNTER — OFFICE VISIT (OUTPATIENT)
Dept: ENDOCRINOLOGY | Facility: CLINIC | Age: 65
End: 2025-08-04
Payer: MEDICAID

## 2025-08-04 ENCOUNTER — LAB VISIT (OUTPATIENT)
Dept: LAB | Facility: HOSPITAL | Age: 65
End: 2025-08-04
Attending: NURSE PRACTITIONER
Payer: MEDICAID

## 2025-08-04 VITALS
HEART RATE: 86 BPM | RESPIRATION RATE: 18 BRPM | SYSTOLIC BLOOD PRESSURE: 131 MMHG | BODY MASS INDEX: 21.39 KG/M2 | WEIGHT: 128.38 LBS | DIASTOLIC BLOOD PRESSURE: 68 MMHG | TEMPERATURE: 97 F | HEIGHT: 65 IN

## 2025-08-04 DIAGNOSIS — E04.2 MULTINODULAR GOITER: ICD-10-CM

## 2025-08-04 DIAGNOSIS — E21.3 HYPERPARATHYROIDISM: ICD-10-CM

## 2025-08-04 DIAGNOSIS — M81.0 OSTEOPOROSIS, UNSPECIFIED OSTEOPOROSIS TYPE, UNSPECIFIED PATHOLOGICAL FRACTURE PRESENCE: Primary | ICD-10-CM

## 2025-08-04 DIAGNOSIS — M48.56XG NON-TRAUMATIC COMPRESSION FRACTURE OF L1 LUMBAR VERTEBRA WITH DELAYED HEALING, SUBSEQUENT ENCOUNTER: ICD-10-CM

## 2025-08-04 LAB
25(OH)D3+25(OH)D2 SERPL-MCNC: 35 NG/ML (ref 30–80)
ALBUMIN SERPL-MCNC: 3.8 G/DL (ref 3.4–4.8)
BUN SERPL-MCNC: 6.5 MG/DL (ref 9.8–20.1)
CALCIUM SERPL-MCNC: 9.3 MG/DL (ref 8.4–10.2)
CHLORIDE SERPL-SCNC: 102 MMOL/L (ref 98–107)
CO2 SERPL-SCNC: 25 MMOL/L (ref 23–31)
CREAT SERPL-MCNC: 0.69 MG/DL (ref 0.55–1.02)
GFR SERPLBLD CREATININE-BSD FMLA CKD-EPI: >60 ML/MIN/1.73/M2
GLUCOSE SERPL-MCNC: 87 MG/DL (ref 82–115)
MAGNESIUM SERPL-MCNC: 2 MG/DL (ref 1.6–2.6)
PHOSPHATE SERPL-MCNC: 2.5 MG/DL (ref 2.3–4.7)
POTASSIUM SERPL-SCNC: 4 MMOL/L (ref 3.5–5.1)
PTH-INTACT SERPL-MCNC: 172.2 PG/ML (ref 8.7–77)
SODIUM SERPL-SCNC: 135 MMOL/L (ref 136–145)
T4 FREE SERPL-MCNC: 1.12 NG/DL (ref 0.7–1.48)
TSH SERPL-ACNC: 0.56 UIU/ML (ref 0.35–4.94)

## 2025-08-04 PROCEDURE — 1159F MED LIST DOCD IN RCRD: CPT | Mod: CPTII,,, | Performed by: NURSE PRACTITIONER

## 2025-08-04 PROCEDURE — 36415 COLL VENOUS BLD VENIPUNCTURE: CPT

## 2025-08-04 PROCEDURE — 84439 ASSAY OF FREE THYROXINE: CPT

## 2025-08-04 PROCEDURE — 80069 RENAL FUNCTION PANEL: CPT

## 2025-08-04 PROCEDURE — 82306 VITAMIN D 25 HYDROXY: CPT

## 2025-08-04 PROCEDURE — 3079F DIAST BP 80-89 MM HG: CPT | Mod: CPTII,,, | Performed by: NURSE PRACTITIONER

## 2025-08-04 PROCEDURE — 1160F RVW MEDS BY RX/DR IN RCRD: CPT | Mod: CPTII,,, | Performed by: NURSE PRACTITIONER

## 2025-08-04 PROCEDURE — 83970 ASSAY OF PARATHORMONE: CPT

## 2025-08-04 PROCEDURE — 3008F BODY MASS INDEX DOCD: CPT | Mod: CPTII,,, | Performed by: NURSE PRACTITIONER

## 2025-08-04 PROCEDURE — 83735 ASSAY OF MAGNESIUM: CPT

## 2025-08-04 PROCEDURE — 84443 ASSAY THYROID STIM HORMONE: CPT

## 2025-08-04 PROCEDURE — 99214 OFFICE O/P EST MOD 30 MIN: CPT | Mod: S$PBB,,, | Performed by: NURSE PRACTITIONER

## 2025-08-04 PROCEDURE — 3077F SYST BP >= 140 MM HG: CPT | Mod: CPTII,,, | Performed by: NURSE PRACTITIONER

## 2025-08-04 PROCEDURE — 99215 OFFICE O/P EST HI 40 MIN: CPT | Mod: PBBFAC | Performed by: NURSE PRACTITIONER

## 2025-08-04 RX ORDER — CIPROFLOXACIN 500 MG/1
500 TABLET, FILM COATED ORAL EVERY 12 HOURS
COMMUNITY
Start: 2025-07-28

## 2025-08-04 NOTE — PROGRESS NOTES
Subjective     Patient ID: Araceli Cosby is a 65 y.o. female.    Chief Complaint: Osteoporosis and Goiter    Endocrine clinic note 02/15/2023: 63 year old female her today for Endocrine clinic F/U for nontoxic multinodular goiter, and osteoporosis.  Patient had ultrasound on 09/16/2022 There is a cystic lesion seen in the right thyroid in the upper pole that measures 3 mm x 3 mm.  There is a mix attenuation nodule in the midpole that measures 4 mm x 4 mm.  There is another cystic area seen in the lower pole that measures 4 mm by 4 mm.  There are multiple areas of nodularity seen in the left thyroid.  Largest nodule measures 6 mm x 5 mm and is seen in the midpole.  Nodules do not meet size for criteria repeat 2 year.  Follow-up ultrasound  no nodules are palpated on exam today.  Previous thyroid functions within normal limits TSH 0.5092, free T4 1.35, free T3 2.98.  Osteoporosis patient had previous T11 fracture.  Note to chart indicates the patient has had multiple lumbar and pelvic fractures.  Discussed patient starting Prolia patient is currently undergoing dental work will complete her dental work and will research on Prolia to make an informed decision on starting.     Endocrine clinic note 08/04/2023:  63-year-old female scheduled today for endocrine clinic follow-up history of nontoxic multinodular goiter and osteoporosis. Multinodular goiter Ultrasound 09/21/2022, TSH 0.5092, free T4 1.35, free T3 2.98. Largest nodule 6 mm x 5 mm seen in midpole  Multi nodules none meeting criteria for FNA/ I will repeat follow-up ultrasound 2 years.  Hyperparathyroidism Previous Calcium level 10.5  Symptomatic patient reports she has tingling around her mouth and also at times feet. Ultrasound 09/21/2022 no parathyroid adenoma seen  NM parathyroid 02/16/2023 no adenoma seen.  Previously CT 4D parathyroid not ran due to patient has iodine allergy listed as a high reactive radiology not comfortable was completing CT at  that time.  Osteoporosis patient is seen specialist from [facility] she was supposed to be started on Evenity and had not received orders to go infusion clinic to start.  She states she was wanting to see a new dentist and has been x-rays 1st and she has a fractured tooth to correct before.  Instruct her she is to contact his doctor for orders to start her medication.  Patient has multiple fractures has not had any fractures since her previous visit.      Endocrine clinic note 02/07/2024:  64-year-old female scheduled today for endocrine clinic follow-up.  History of nontoxic multinodular goiter, hyperparathyroidism and osteoporosis. Multinodular goiter Ultrasound 09/21/2022  Largest nodule 6 mm x 5 mm seen in midpole  Multi nodules none meeting criteria for FNA/ I will repeat follow-up ultrasound 2 years. Hyperparathyroidism Previous Calcium level 9.8 on 08/04/2023, asymptomatic, Ultrasound 09/21/2022 no parathyroid adenoma seen, NM parathyroid 02/16/2023 no adenoma seen. .1 on 08/04/2023.  Recent calcium level normal we will repeat labs today to ensure remains normal.  Osteoporosis patient was seen previously and discuss that her PCP we will be treating her osteoporosis.      Endocrine clinic note 08/07/2024:  64-year-old female scheduled today for endocrine clinic follow-up.  History of nontoxic multinodular goiter, hyperparathyroidism and osteoporosis. Multinodular goiter Ultrasound 09/21/2022  Largest nodule 6 mm x 5 mm seen in midpole  Multi nodules none meeting criteria for FNA/ will repeat follow-up ultrasound 2 years.  Ultrasound ordered today.  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  Hyperparathyroidism  Previous Calcium level 9.4, vitamin-D 44.2, .8, magnesium level 2.10 on 02/07/2024, Asymptomatic. Ultrasound 09/21/2022 no parathyroid adenoma seen, NM parathyroid 02/16/2023 no adenoma  seen. Will Continue to monitor calcium level.  Osteoporosis patient has a not started Prolia has declined to have it done in Endocrine Clinic.  Stated at 1 point her PCP would administer PCP stated she would not administer patient declined again stating she wanted to see a dentist 1st then stating she saw a doctor in [place] for infusions.      Endocrine clinic note 02/10/2025:  65-year-old female scheduled today for endocrine clinic follow-up.  History of nontoxic multinodular goiter, hyperparathyroidism and osteoporosis. Multinodular goiter Ultrasound 09/21/2022  Largest nodule 6 mm x 5 mm seen in midpole Multi nodules none meeting criteria for FNA/ will repeat follow-up ultrasound 2 years. Repeat follow-up ultrasound on 08/27/2024 Small bilateral nodules do not meet follow-up or FNA criteria. Pt denies any palpable nodules. Hyperparathyroidism Calcium level 10.0, .2, Magnesium 2.1 on 08/07/2024, Asymptomatic. Ultrasound 09/21/2022 no parathyroid adenoma seen  NM parathyroid 02/16/2023 no adenoma seen. Continue to monitor calcium level calcium remains in normal range.  Osteoporosis previous pt Had not started Prolia (declined) , Previously reported wants dental Xray prior. Seen Dr from [place] sent Evenaric to infusion clinic pt has not heard from for appt. will F/u. Instructed pt she could start treatment today she started to cry. Recent bone density repeated will request records. Pt has multiple compression fractures with chronic back pain.  Pt will complete blood work today, Recent Bone density requested. Will start Evenity once all received.       Endocrine clinic note 08/04/2025: 65-year-old female scheduled today for endocrine clinic follow-up.  History of nontoxic multinodular goiter, hyperparathyroidism and osteoporosis. Multinodular goiter Ultrasound 09/21/2022  Largest nodule 6 mm x 5 mm seen in midpole Multi nodules none meeting criteria for FNA/ will repeat follow-up ultrasound 2 years. Repeat  follow-up ultrasound on 08/27/2024 Small bilateral nodules do not meet follow-up or FNA criteria. Pt denies any palpable nodules. Hyperparathyroidism  Calcium level 10.0, .2, vit D 40 on 02/10/2025. Asymptomatic  Ultrasound 09/21/2022 no parathyroid adenoma seen. NM parathyroid 02/16/2023 no adenoma seen.  Osteoporosis Had not started Prolia previous (declined). Previously reported wants dental Xray prior did not make  appt. W/ dentist  (not reported). Previously Seen Dr from [place] sent Evenity to infusion clinic pt has not heard from for appt. will F/u.  Patient was to be scheduled for states that she does want to  progress until she had her dental work.   She states she is having dental work soon she need root canal and has 4 cavities. Pt is scheduled 08/19/2025 for dental appt.       Result Note  Details        Reading Physician      Reading Date  Result Priority  Angelo Carver MD  [phone]  2/23/2023        Routine     Narrative & Impression  EXAMINATION:  NM PARATHYROID SCAN PLANAR     CLINICAL HISTORY:  Hyperparathyroidism, unspecified     TECHNIQUE:  Following injection of 25.9 99mTc sestamibi and approximately 10 minute delay, anterior projection images of the neck and chest were obtained. After a two-hour delay, repeat anterior projection images of the neck were acquired.     COMPARISON:  None.     FINDINGS:  There is physiological tracer uptake in the myocardium, salivary glands, and thyroid gland. Delayed images demonstrate near-complete tracer washout from the thyroid.     No focal abnormal persistent uptake is present on delayed images in the region of the parathyroid glands to suggest parathyroid adenoma.     Impression:     No abnormal uptake to suggest parathyroid adenoma        Electronically signed by: Angelo Carver MD  Date:                                            02/23/2023  Time:                                           14:26           Exam Ended: 02/23/23 11:47 Last Resulted:  02/23/23 14:26                 Result Notes  Details        Reading Physician      Reading Date  Result Priority  Aaron Cardenas MD  [phone]  9/21/2022        Routine     Narrative & Impression  EXAMINATION:  US THYROID     CLINICAL HISTORY:  Nontoxic single thyroid nodule     TECHNIQUE:  Ultrasound of the thyroid and cervical lymph nodes was performed.     COMPARISON:  None.     FINDINGS:  The right thyroid measures 5.1 x 1.4 x 1.6 cm and left thyroid measures 4.5 x 1 x 1.2 cm.  There are multiple nodules seen in both thyroid lobes.  Isthmus measures 1.2 mm.  The echotexture of the thyroid is diffusely heterogeneous bilaterally.  There is a cystic lesion seen in the right thyroid in the upper pole that measures 3 mm x 3 mm.  There is a mix attenuation nodule in the midpole that measures 4 mm x 4 mm.  There is another cystic area seen in the lower pole that measures 4 mm by 4 mm.  There are multiple areas of nodularity seen in the left thyroid.  Largest nodule measures 6 mm x 5 mm and is seen in the midpole.  To both lobes appears normal.     Impression:     Multinodular goiter bilaterally        Electronically signed by: Aaron Cardenas  Date:                                            09/21/2022  Time:                                           15:02         US Thyroid  Order: [id number]  Status: Final result       Visible to patient: No (inaccessible in MyChart)       Next appt: 05/22/2025 at 10:20 AM in Ophthalmology ([facility])       Dx: Multinodular goiter    2 Result Notes  Details        Reading PhysicianReading DateResult Priority  Kyler Diehl MD  [phone]/27/2024Routine     Narrative & Impression  EXAMINATION:  US THYROID     CLINICAL HISTORY:  MNG; Nontoxic multinodular goiter     COMPARISON:  02/15/2024     FINDINGS:  Right thyroid lobe measures 4.0 cm.  Left thyroid lobe measures 4.2 cm.  Isthmus measures 0.2 cm.     Multiple subcentimeter cystic and part cystic  nodules are present.  No dominant nodules.     Impression:     Small bilateral nodules do not meet follow-up or FNA criteria        Electronically signed by:Kyler Diehl MD  Date:                                            08/27/2024  Time:                                           13:48           Exam Ended: 08/27/24 13:40 CDTLast Resulted: 08/27/24 13:48 CDT      Order Details        View Encounter        Lab and Collection Details        Routing        Result History                   US Thyroid  Order: [id number]   Status: Final result       Next appt: 08/08/2025 at 01:00 PM in Neurosurgery (Martínez Fountain MD)       Dx: Multinodular goiter    Test Result Released: No (inaccessible in MyChart)    2 Result Notes  Details      Reading Physician Reading Date Result Priority  Kyler Diehl MD  [phone]  8/27/2024 Routine    Narrative & Impression  EXAMINATION:  US THYROID     CLINICAL HISTORY:  MNG; Nontoxic multinodular goiter     COMPARISON:  02/15/2024     FINDINGS:  Right thyroid lobe measures 4.0 cm.  Left thyroid lobe measures 4.2 cm.  Isthmus measures 0.2 cm.     Multiple subcentimeter cystic and part cystic nodules are present.  No dominant nodules.     Impression:     Small bilateral nodules do not meet follow-up or FNA criteria        Electronically signed by:Kyler Diehl MD  Date:                                            08/27/2024  Time:                                           13:48      Exam Ended: 08/27/24 13:40 CDT Last Resulted: 08/27/24 13:48 CDT                        Review of Systems   Constitutional:  Negative for activity change, appetite change and fatigue.   HENT:  Negative for dental problem, hearing loss, tinnitus, trouble swallowing and goiter.    Eyes:  Negative for photophobia, pain and visual disturbance.   Respiratory:  Negative for cough, chest tightness and wheezing.    Cardiovascular:  Negative for chest pain, palpitations and leg swelling.   Gastrointestinal:  Negative  for abdominal pain, constipation, diarrhea, nausea and reflux.   Endocrine: Negative for cold intolerance, heat intolerance, polydipsia and polyphagia.   Genitourinary:  Negative for difficulty urinating, flank pain, hematuria, hot flashes, menstrual irregularity, menstrual problem, nocturia and urgency.   Musculoskeletal:  Positive for arthralgias, back pain and joint swelling. Negative for gait problem, leg pain and joint deformity.   Integumentary:  Negative for color change, pallor, rash and breast discharge.   Allergic/Immunologic: Negative for environmental allergies, food allergies and immunocompromised state.   Neurological:  Negative for tremors, seizures, headaches, coordination difficulties and memory loss.   Psychiatric/Behavioral:  Negative for agitation, behavioral problems and sleep disturbance. The patient is not nervous/anxious.           Objective     Physical Exam  Constitutional:       General: She is not in acute distress.     Appearance: Normal appearance. She is not ill-appearing.   HENT:      Head: Normocephalic and atraumatic.      Right Ear: External ear normal.      Left Ear: External ear normal.      Nose: Nose normal. No congestion or rhinorrhea.      Mouth/Throat:      Mouth: Mucous membranes are moist.      Pharynx: Oropharynx is clear. No oropharyngeal exudate.   Eyes:      General:         Right eye: No discharge.         Left eye: No discharge.      Conjunctiva/sclera: Conjunctivae normal.      Pupils: Pupils are equal, round, and reactive to light.   Neck:      Thyroid: No thyroid mass, thyromegaly or thyroid tenderness.   Cardiovascular:      Rate and Rhythm: Normal rate and regular rhythm.      Pulses: Normal pulses.      Heart sounds: Normal heart sounds. No murmur heard.  Pulmonary:      Effort: Pulmonary effort is normal. No respiratory distress.      Breath sounds: Normal breath sounds.   Abdominal:      General: Abdomen is flat. Bowel sounds are normal. There is no  distension.      Palpations: Abdomen is soft.      Tenderness: There is no abdominal tenderness.   Musculoskeletal:         General: No swelling or tenderness. Normal range of motion.      Cervical back: Normal range of motion and neck supple. No tenderness.      Right lower leg: No edema.      Left lower leg: No edema.   Feet:      Right foot:      Skin integrity: Skin integrity normal.      Left foot:      Skin integrity: Skin integrity normal.   Lymphadenopathy:      Cervical: No cervical adenopathy.   Skin:     General: Skin is warm and dry.      Coloration: Skin is not jaundiced or pale.   Neurological:      General: No focal deficit present.      Mental Status: She is alert and oriented to person, place, and time. Mental status is at baseline.      Coordination: Coordination normal.      Gait: Gait normal.   Psychiatric:         Mood and Affect: Mood normal.         Behavior: Behavior normal.         Thought Content: Thought content normal.            Assessment and Plan     1. Osteoporosis, unspecified osteoporosis type, unspecified pathological fracture presence  Had not started Prolia (declined)   Previously reported wants dental X-ray prior   Making appt. W/ dentist  (not reported)   Seen Dr from [place] sent Evenity to infusion clinic pt has not heard from for appt. will F/u   Pt to have dental work over the next 6 weeks            Component  Ref Range & Units (hover) 5 mo ago  (2/10/25) 12 mo ago  (8/7/24) 1 yr ago  (2/7/24) 2 yr ago  (8/4/23) 2 yr ago  (2/15/23) 2 yr ago  (11/22/22)   Vitamin D 40 45 44.2 R 53.3 R 68.9 R 90.2 High  R   Resulting Agency [facility]          2. Non-traumatic compression fracture of L1 lumbar vertebra with delayed healing, subsequent encounter  CT 03/01/2024  There are age-indeterminate, chronic appearing compression fractures of T10, T11, T12, L1, L3, L4, L5 vertebral bodies. Mild compression fracture of superior endplate of L2  also noted. No osseous destruction seen. No erosions seen     3. Multinodular goiter  TSH 0.595, free T4 1.16 on 08/04/2023   Ultrasound 09/21/2022 Largest nodule 6 mm x 5 mm seen in midpole, Multi nodules none meeting criteria for FNA  Repeat follow-up ultrasound on 08/27/2024 Small bilateral nodules do not meet follow-up or FNA criteria          Component  Ref Range & Units (hover) 5 mo ago  (2/10/25) 2 yr ago  (8/4/23) 2 yr ago  (11/22/22) 2 yr ago  (9/21/22) 8 yr ago  (7/27/17)   TSH 1.001 0.595 0.5092 R 0.9350 R 0.529 R               Component  Ref Range & Units (hover) 5 mo ago  (2/10/25) 2 yr ago  (8/4/23) 2 yr ago  (11/22/22) 2 yr ago  (9/21/22)   Thyroxine Free 1.11 1.16 1.35 0.94   -     T4, Free; Future; Expected date: 08/04/2025  -     TSH; Future; Expected date: 08/04/2025    4. Hyperparathyroidism  Calcium level 10.0, .2, vit D 40 on 02/10/2025    Asymptomatic  Ultrasound 09/21/2022 no parathyroid adenoma seen  NM parathyroid 02/16/2023 no adenoma seen  Continue to monitor calcium level            Component  Ref Range & Units (hover) 5 mo ago  (2/10/25) 12 mo ago  (8/7/24) 1 yr ago  (2/7/24) 2 yr ago  (8/4/23) 2 yr ago  (2/15/23) 2 yr ago  (11/22/22) 8 yr ago  (6/26/17)   Sodium 138 137 137 137 138 140 139   Potassium 4.6 4.0 4.2 4.2 4.9 3.4 Low  3.5   Chloride 106 103 103 101 104 102 102   CO2 27 26 27 28 26 26 27.0 R   Glucose 106 91 90 93 75 Low  136 High  78 R   Blood Urea Nitrogen 10.2 7.5 Low  7.4 Low  5.2 Low  6.2 Low  7.7 Low  7.0 R   Creatinine 0.64 0.70 0.67 0.70 0.63 0.66 0.71   Calcium 10.0 10.0 9.4 9.8 10.5 High  10.1                  Component  Ref Range & Units (hover) 5 mo ago  (2/10/25) 12 mo ago  (8/7/24) 1 yr ago  (2/7/24) 2 yr ago  (8/4/23) 2 yr ago  (2/15/23) 2 yr ago  (11/22/22)   PARATHYROID HORMONE INTACT 129.2 High  216.2 High  152.8 High  154.1 High  110.3 High  112.3 High                  Component  Ref Range & Units (hover) 5 mo ago  (2/10/25) 12 mo ago  (8/7/24) 1 yr  ago  (2/7/24) 2 yr ago  (8/4/23) 2 yr ago  (2/15/23) 2 yr ago  (11/22/22)   Vitamin D 40 45 44.2 R 53.3 R 68.9 R 90.2 High  R   Resulting Agency [facility]      -     Renal Function Panel; Future; Expected date: 08/04/2025  -     Vitamin D; Future; Expected date: 08/04/2025  -     PTH, Intact; Future; Expected date: 08/04/2025  -     Magnesium; Future; Expected date: 08/04/2025      Follow up in about 4 weeks (around 9/1/2025) for Follow up with Dr Montoya due to complexity and needs evenity .

## 2025-08-05 DIAGNOSIS — M25.531 RIGHT WRIST PAIN: Primary | ICD-10-CM

## 2025-08-12 ENCOUNTER — HOSPITAL ENCOUNTER (OUTPATIENT)
Dept: RADIOLOGY | Facility: HOSPITAL | Age: 65
Discharge: HOME | End: 2025-08-12
Attending: STUDENT IN AN ORGANIZED HEALTH CARE EDUCATION/TRAINING PROGRAM
Payer: MEDICAID

## 2025-08-12 DIAGNOSIS — M25.531 RIGHT WRIST PAIN: ICD-10-CM

## 2025-08-12 PROCEDURE — 73221 MRI JOINT UPR EXTREM W/O DYE: CPT | Mod: TC,RT

## 2025-09-04 ENCOUNTER — OFFICE VISIT (OUTPATIENT)
Dept: ENDOCRINOLOGY | Facility: CLINIC | Age: 65
End: 2025-09-04
Payer: MEDICAID

## 2025-09-04 VITALS
HEART RATE: 79 BPM | BODY MASS INDEX: 21.47 KG/M2 | WEIGHT: 128.88 LBS | DIASTOLIC BLOOD PRESSURE: 84 MMHG | HEIGHT: 65 IN | SYSTOLIC BLOOD PRESSURE: 149 MMHG | RESPIRATION RATE: 10 BRPM | TEMPERATURE: 98 F

## 2025-09-04 DIAGNOSIS — E04.2 MULTINODULAR GOITER: ICD-10-CM

## 2025-09-04 DIAGNOSIS — E21.3 HYPERPARATHYROIDISM: Primary | ICD-10-CM

## 2025-09-04 DIAGNOSIS — M81.0 OSTEOPOROSIS, UNSPECIFIED OSTEOPOROSIS TYPE, UNSPECIFIED PATHOLOGICAL FRACTURE PRESENCE: ICD-10-CM

## 2025-09-04 PROCEDURE — 99215 OFFICE O/P EST HI 40 MIN: CPT | Mod: PBBFAC

## (undated) DEVICE — ILLUMINATOR PHOTONBLADE 8/11IN

## (undated) DEVICE — SUT CTD VICRYL PLUS 4/0

## (undated) DEVICE — DRAPE INCISE IOBAN 2 23X17IN

## (undated) DEVICE — PAD HEARTSTART DEFIB ADULT

## (undated) DEVICE — SUT SILK 0 BLK BR CT-1 30IN

## (undated) DEVICE — PACK PACER PERMANENT

## (undated) DEVICE — ADHESIVE DERMABOND ADVANCED

## (undated) DEVICE — SUT VICRYL 2-0 27 CT-1